# Patient Record
Sex: MALE | Race: WHITE | NOT HISPANIC OR LATINO | Employment: OTHER | URBAN - METROPOLITAN AREA
[De-identification: names, ages, dates, MRNs, and addresses within clinical notes are randomized per-mention and may not be internally consistent; named-entity substitution may affect disease eponyms.]

---

## 2017-01-10 PROBLEM — M25.561 ACUTE BILATERAL KNEE PAIN: Status: ACTIVE | Noted: 2017-01-10

## 2017-01-10 PROBLEM — M25.562 ACUTE BILATERAL KNEE PAIN: Status: ACTIVE | Noted: 2017-01-10

## 2017-05-23 PROBLEM — R53.81 MALAISE AND FATIGUE: Status: ACTIVE | Noted: 2017-05-23

## 2017-05-23 PROBLEM — M17.0 PRIMARY OSTEOARTHRITIS OF BOTH KNEES: Status: ACTIVE | Noted: 2017-05-23

## 2017-05-23 PROBLEM — M25.552 PAIN OF BOTH HIP JOINTS: Status: ACTIVE | Noted: 2017-05-23

## 2017-05-23 PROBLEM — M25.551 PAIN OF BOTH HIP JOINTS: Status: ACTIVE | Noted: 2017-05-23

## 2017-05-23 PROBLEM — G89.29 BILATERAL CHRONIC KNEE PAIN: Status: ACTIVE | Noted: 2017-01-10

## 2017-05-23 PROBLEM — M25.462 EFFUSION, LEFT KNEE: Status: ACTIVE | Noted: 2017-05-23

## 2017-05-23 PROBLEM — R53.83 MALAISE AND FATIGUE: Status: ACTIVE | Noted: 2017-05-23

## 2017-05-29 PROBLEM — E29.1 HYPOGONADISM IN MALE: Status: ACTIVE | Noted: 2017-05-29

## 2017-05-30 DIAGNOSIS — M25.552 BILATERAL HIP PAIN: Primary | ICD-10-CM

## 2017-05-30 DIAGNOSIS — M25.562 PAIN IN BOTH KNEES, UNSPECIFIED CHRONICITY: ICD-10-CM

## 2017-05-30 DIAGNOSIS — M25.561 PAIN IN BOTH KNEES, UNSPECIFIED CHRONICITY: ICD-10-CM

## 2017-05-30 DIAGNOSIS — M25.551 BILATERAL HIP PAIN: Primary | ICD-10-CM

## 2017-05-31 ENCOUNTER — HOSPITAL ENCOUNTER (OUTPATIENT)
Dept: RADIOLOGY | Facility: HOSPITAL | Age: 62
Discharge: HOME | End: 2017-05-31
Attending: ORTHOPAEDIC SURGERY
Payer: COMMERCIAL

## 2017-05-31 ENCOUNTER — OFFICE VISIT (OUTPATIENT)
Dept: ORTHOPEDICS | Facility: CLINIC | Age: 62
End: 2017-05-31
Payer: COMMERCIAL

## 2017-05-31 VITALS — HEIGHT: 71 IN | WEIGHT: 315 LBS | BODY MASS INDEX: 44.1 KG/M2

## 2017-05-31 DIAGNOSIS — M25.561 PAIN IN BOTH KNEES, UNSPECIFIED CHRONICITY: ICD-10-CM

## 2017-05-31 DIAGNOSIS — M25.551 HIP PAIN, BILATERAL: ICD-10-CM

## 2017-05-31 DIAGNOSIS — M25.552 BILATERAL HIP PAIN: ICD-10-CM

## 2017-05-31 DIAGNOSIS — M25.552 HIP PAIN, BILATERAL: ICD-10-CM

## 2017-05-31 DIAGNOSIS — M25.562 PAIN IN BOTH KNEES, UNSPECIFIED CHRONICITY: ICD-10-CM

## 2017-05-31 DIAGNOSIS — M17.0 PRIMARY OSTEOARTHRITIS OF BOTH KNEES: Primary | ICD-10-CM

## 2017-05-31 DIAGNOSIS — E66.01 MORBID OBESITY WITH BMI OF 45.0-49.9, ADULT: ICD-10-CM

## 2017-05-31 DIAGNOSIS — M25.551 BILATERAL HIP PAIN: ICD-10-CM

## 2017-05-31 PROCEDURE — 73521 X-RAY EXAM HIPS BI 2 VIEWS: CPT | Mod: TC,PO

## 2017-05-31 PROCEDURE — 73562 X-RAY EXAM OF KNEE 3: CPT | Mod: TC,50,PO

## 2017-05-31 PROCEDURE — 99243 OFF/OP CNSLTJ NEW/EST LOW 30: CPT | Mod: S$GLB,,, | Performed by: ORTHOPAEDIC SURGERY

## 2017-05-31 PROCEDURE — 73521 X-RAY EXAM HIPS BI 2 VIEWS: CPT | Mod: 26,,, | Performed by: RADIOLOGY

## 2017-05-31 PROCEDURE — 73562 X-RAY EXAM OF KNEE 3: CPT | Mod: 26,50,, | Performed by: RADIOLOGY

## 2017-05-31 PROCEDURE — 99999 PR PBB SHADOW E&M-EST. PATIENT-LVL II: CPT | Mod: PBBFAC,,, | Performed by: ORTHOPAEDIC SURGERY

## 2017-05-31 NOTE — PROGRESS NOTES
Arthur Moreno, 62 years old, referred by Dr. Delaney.  Communication via [employer].    Complaining of right greater than left knee for about a year's time.  It had   gotten progressively worse, had it for multiple years.  In fact, he had knee   scope on each knee x2.  He has had viscosupplementation.  He has had nerve   blocks, all of which only gave him partial temporary relief he is getting   progressively worse.  He is interested in more aggressive treatment.  Pain is   7/10 on the pain scale, debilitating pain.    Exam today shows he has tenderness at the joint line.  No signs of infection.    No instability.  Hip range of motion does not reproduce his symptoms.  Skin is   intact.  Compartments are soft.    X-rays of the knee showed degenerative changes, right greater than left, mild   degenerative changes of the hips.    ASSESSMENT:  Knee arthrosis.    PLAN:  We discussed with him in repeating injections and other conservative   measures versus knee replacement surgery.  He is going to consider these   options.  I would be happy to offer him any of these as he has already undergone   longstanding nonoperative course, but we would be happy to continue that if   that is what he wants to do.  We will work and proceed with knee replacement on   the right.      PBB/PN  dd: 05/31/2017 09:55:11 (CDT)  td: 06/01/2017 00:45:30 (CDT)  Doc ID   #[id number]  Job ID #[id number]    CC:     Further History  Aching pain  Worse with activity  Relieved with rest  No other associated symptoms  No other radiation    Further Exam  Alert and oriented  Pleasant  Contralateral limb has appropriate range of motion for age and condition  Contralateral limb has appropriate strength for age and condition  Contralateral limb has appropriate stability  for age and condition  No adenopathy  Pulses are appropriate for current condition  Skin is intact        Chief Complaint    Chief Complaint   Patient presents with    Knee Pain     bilat knee    Hip  Pain     bilat hip        HPI  Arthur Moreno is a 62 y.o.  male who presents with       Past Medical History  Past Medical History:   Diagnosis Date    Amaurosis fugax of right eye 4/2014    resolved    Bilateral venous insufficiency     legs    Calculus of gallbladder without mention of cholecystitis or obstruction     Cardiac arrhythmia     has implanted loop recorder, palpitations. Hx dizziness on bending over    Cellulitis and abscess of leg April 2014    treated at wound care center, resolving    Coronary artery disease     denies chest pain, denies MI, no stents; sees Dr Grant    Generalized osteoarthrosis, involving multiple sites     Hyperlipidemia     Hypertension     Hypertension 10/7/2015    Hypothyroidism     Knee pain     Obesity, unspecified     Occlusion and stenosis of carotid artery without mention of cerebral infarction     Sleep apnea with use of continuous positive airway pressure (CPAP)     Status post placement of implantable loop recorder     Thyroid disease     Transient arterial occlusion of retina     Transient visual loss     BOTH EYE  PER DR CAMPOVERDE  7/27/2015       Past Surgical History  Past Surgical History:   Procedure Laterality Date    BACK SURGERY N/A     decompression of L2 to L5     CARDIAC SURGERY  2013?    insertion loop recorder    CARDIAC SURGERY  2014    transesophageal echo; no clot seen, per patient    CARPAL TUNNEL RELEASE      bilateral    CHOLECYSTECTOMY      EVLT Right     HERNIA REPAIR      KNEE ARTHROSCOPY      bilateral    LAPAROSCOPIC GASTRIC BANDING      LHC      LUMBAR EPIDURAL INJECTION      POLYPECTOMY      vocal cord    ROTATOR CUFF REPAIR      right       Medications  Current Outpatient Prescriptions   Medication Sig    alprazolam (XANAX) 0.5 MG tablet Take 1 tablet (0.5 mg total) by mouth 3 (three) times daily. (Patient taking differently: Take 0.5 mg by mouth daily as needed for Anxiety. )    aspirin (ECOTRIN) 81 MG  EC tablet Take 81 mg by mouth once daily. Patient held since 6/12/16 per md instructions    atenolol (TENORMIN) 50 MG tablet Take 1 tablet (50 mg total) by mouth once daily.    atorvastatin (LIPITOR) 40 MG tablet Take 1 tablet (40 mg total) by mouth nightly. Use another dose prior to arrival (Patient taking differently: Take 40 mg by mouth nightly. )    b complex vitamins capsule Take 1 capsule by mouth once daily.    docusate sodium (COLACE) 100 MG capsule Take 1 capsule (100 mg total) by mouth once daily.    FLUVIRIN [id number] 45 mcg (15 mcg x 3)/0.5 mL Susp     fosinopril (MONOPRIL) 40 MG tablet Take 1 tablet (40 mg total) by mouth once daily.    glucosamine-chondroitin 500-400 mg tablet Take 2 tablets by mouth once daily.    Lactobacillus rhamnosus GG (CULTURELLE) 10 billion cell capsule Take 1 capsule by mouth once daily.    levothyroxine (SYNTHROID) 88 MCG tablet Take 1 tablet (88 mcg total) by mouth before breakfast.    lorcaserin (BELVIQ) 10 mg Tab Take 10 mg by mouth 2 (two) times daily.    multivitamin (ONE DAILY MULTIVITAMIN) per tablet Take 1 tablet by mouth once daily.    NUCYNTA 75 mg Tab Take 75 mg by mouth every 6 (six) hours as needed.     oxycodone-acetaminophen (PERCOCET) 7.5-325 mg per tablet Take 1 tablet by mouth every 4 (four) hours as needed. Take 1-2 tabs by mouth every 4 hours as needed for pain.    potassium chloride SA (K-DUR,KLOR-CON) 10 MEQ tablet Take 2 tablets (20 mEq total) by mouth once daily.    torsemide (DEMADEX) 20 MG Tab Take 1 tablet (20 mg total) by mouth every morning.     No current facility-administered medications for this visit.        Allergies  Review of patient's allergies indicates:   Allergen Reactions    Lamisil [terbinafine] Rash     Emollient form only , also had Bactrim in it, not sure       Family History  Family History   Problem Relation Age of Onset    Diabetes Mother     Heart disease Mother     Diabetes Father     Heart disease Father         Social History  Social History     Social History    Marital status:      Spouse name: N/A    Number of children: N/A    Years of education: N/A     Occupational History    Not on file.     Social History Main Topics    Smoking status: Never Smoker    Smokeless tobacco: Never Used    Alcohol use Yes      Comment: daily  16 oz daily     Drug use: No    Sexual activity: Not on file     Other Topics Concern    Not on file     Social History Narrative    No narrative on file               Review of Systems     Constitutional: Negative    HENT: Negative  Eyes: Negative  Respiratory: Negative  Cardiovascular: Negative  Musculoskeletal: HPI  Skin: Negative  Neurological: Negative  Hematological: Negative  Endocrine: Negative                 Physical Exam    There were no vitals filed for this visit.  Body mass index is 47.84 kg/m².  Physical Examination:     General appearance -  well appearing, and in no distress  Mental status - awake  Neck - supple  Chest -  symmetric air entry  Heart - normal rate   Abdomen - soft      Assessment     1. Primary osteoarthritis of both knees    2. Pain in both knees, unspecified chronicity    3. Hip pain, bilateral    4. Morbid obesity with BMI of 45.0-49.9, adult          Plan

## 2017-06-06 DIAGNOSIS — M17.9 DJD (DEGENERATIVE JOINT DISEASE) OF KNEE: ICD-10-CM

## 2017-06-06 DIAGNOSIS — M17.11 PRIMARY OSTEOARTHRITIS OF RIGHT KNEE: ICD-10-CM

## 2017-06-06 DIAGNOSIS — M25.561 RIGHT KNEE PAIN, UNSPECIFIED CHRONICITY: Primary | ICD-10-CM

## 2017-06-06 DIAGNOSIS — M17.11 PRIMARY OSTEOARTHRITIS OF RIGHT KNEE: Primary | ICD-10-CM

## 2017-06-06 RX ORDER — MUPIROCIN 20 MG/G
1 OINTMENT TOPICAL
Status: CANCELLED | OUTPATIENT
Start: 2017-06-06

## 2017-06-07 RX ORDER — MUPIROCIN 20 MG/G
1 OINTMENT TOPICAL
Status: CANCELLED | OUTPATIENT
Start: 2017-06-07

## 2017-06-13 ENCOUNTER — HOSPITAL ENCOUNTER (OUTPATIENT)
Dept: RADIOLOGY | Facility: HOSPITAL | Age: 62
Discharge: HOME | End: 2017-06-13
Attending: ORTHOPAEDIC SURGERY
Payer: COMMERCIAL

## 2017-06-13 DIAGNOSIS — M25.561 RIGHT KNEE PAIN, UNSPECIFIED CHRONICITY: ICD-10-CM

## 2017-06-13 DIAGNOSIS — M17.11 PRIMARY OSTEOARTHRITIS OF RIGHT KNEE: ICD-10-CM

## 2017-06-13 DIAGNOSIS — Z98.890 POST-OPERATIVE STATE: Primary | ICD-10-CM

## 2017-06-13 PROCEDURE — 77073 BONE LENGTH STUDIES: CPT | Mod: TC,PO

## 2017-06-13 PROCEDURE — 77073 BONE LENGTH STUDIES: CPT | Mod: 26,,, | Performed by: RADIOLOGY

## 2017-06-14 NOTE — TELEPHONE ENCOUNTER
----- Message from Cheryle Melendrez sent at 6/14/2017  8:20 AM CDT -----  Contact: self  Patient is returning Nakita's call regarding procedure appointment.  Please call patient at [phone].  Thanks!

## 2017-06-15 RX ORDER — WARFARIN SODIUM 5 MG/1
5 TABLET ORAL DAILY
Qty: 30 TABLET | Refills: 0 | Status: ON HOLD | OUTPATIENT
Start: 2017-07-31 | End: 2017-08-17

## 2017-06-15 RX ORDER — OXYCODONE AND ACETAMINOPHEN 5; 325 MG/1; MG/1
1 TABLET ORAL
Qty: 47 TABLET | Refills: 0 | Status: SHIPPED | OUTPATIENT
Start: 2017-08-01 | End: 2017-11-06 | Stop reason: SDUPTHER

## 2017-06-16 ENCOUNTER — HOSPITAL ENCOUNTER (OUTPATIENT)
Dept: RADIOLOGY | Facility: HOSPITAL | Age: 62
Discharge: HOME | End: 2017-06-16
Attending: ORTHOPAEDIC SURGERY
Payer: COMMERCIAL

## 2017-06-16 DIAGNOSIS — M25.561 RIGHT KNEE PAIN, UNSPECIFIED CHRONICITY: ICD-10-CM

## 2017-06-16 DIAGNOSIS — M17.11 PRIMARY OSTEOARTHRITIS OF RIGHT KNEE: ICD-10-CM

## 2017-06-16 PROCEDURE — 73721 MRI JNT OF LWR EXTRE W/O DYE: CPT | Mod: TC,PO,RT

## 2017-06-16 PROCEDURE — 73721 MRI JNT OF LWR EXTRE W/O DYE: CPT | Mod: 26,RT,, | Performed by: RADIOLOGY

## 2017-06-27 ENCOUNTER — TELEPHONE (OUTPATIENT)
Dept: ORTHOPEDICS | Facility: CLINIC | Age: 62
End: 2017-06-27

## 2017-06-27 NOTE — TELEPHONE ENCOUNTER
----- Message from Daryn Zhang sent at 6/27/2017 10:46 AM CDT -----  Contact: pt  Pr is requesting that his surgery clearance request form is faxed over to his cardiologist office  Fax#[phone]   Call Back#[phone]  Thanks

## 2017-07-10 ENCOUNTER — TELEPHONE (OUTPATIENT)
Dept: ORTHOPEDICS | Facility: CLINIC | Age: 62
End: 2017-07-10

## 2017-07-25 DIAGNOSIS — Z96.651 S/P TOTAL KNEE ARTHROPLASTY, RIGHT: Primary | ICD-10-CM

## 2017-07-26 ENCOUNTER — ANTI-COAG VISIT (OUTPATIENT)
Dept: CARDIOLOGY | Facility: CLINIC | Age: 62
End: 2017-07-26

## 2017-07-26 DIAGNOSIS — Z79.01 LONG TERM (CURRENT) USE OF ANTICOAGULANTS: ICD-10-CM

## 2017-08-15 PROBLEM — M17.9 DJD (DEGENERATIVE JOINT DISEASE) OF KNEE: Status: ACTIVE | Noted: 2017-08-15

## 2017-08-17 ENCOUNTER — TELEPHONE (OUTPATIENT)
Dept: ORTHOPEDICS | Facility: CLINIC | Age: 62
End: 2017-08-17

## 2017-08-17 NOTE — TELEPHONE ENCOUNTER
----- Message from Deepali Barrett sent at 8/17/2017 12:18 PM CDT -----  Contact: camelia   Pain pump has come apart from port   Liquid spraying out   Tied off  Call back

## 2017-08-21 ENCOUNTER — ANTI-COAG VISIT (OUTPATIENT)
Dept: CARDIOLOGY | Facility: CLINIC | Age: 62
End: 2017-08-21

## 2017-08-21 DIAGNOSIS — Z79.01 LONG TERM (CURRENT) USE OF ANTICOAGULANTS: ICD-10-CM

## 2017-08-21 LAB — INR PPP: 1.1 (ref 2–3)

## 2017-08-24 ENCOUNTER — ANTI-COAG VISIT (OUTPATIENT)
Dept: CARDIOLOGY | Facility: CLINIC | Age: 62
End: 2017-08-24

## 2017-08-24 DIAGNOSIS — Z79.01 LONG TERM (CURRENT) USE OF ANTICOAGULANTS: ICD-10-CM

## 2017-08-24 LAB — INR PPP: 1.3 (ref 2–3)

## 2017-08-28 ENCOUNTER — ANTI-COAG VISIT (OUTPATIENT)
Dept: CARDIOLOGY | Facility: CLINIC | Age: 62
End: 2017-08-28

## 2017-08-28 DIAGNOSIS — Z79.01 LONG TERM (CURRENT) USE OF ANTICOAGULANTS: ICD-10-CM

## 2017-08-28 LAB — INR PPP: 1.5 (ref 2–3)

## 2017-08-30 ENCOUNTER — OFFICE VISIT (OUTPATIENT)
Dept: ORTHOPEDICS | Facility: CLINIC | Age: 62
End: 2017-08-30
Payer: COMMERCIAL

## 2017-08-30 VITALS — HEIGHT: 71 IN | BODY MASS INDEX: 44.1 KG/M2 | WEIGHT: 315 LBS

## 2017-08-30 DIAGNOSIS — Z96.651 S/P TOTAL KNEE ARTHROPLASTY, RIGHT: Primary | ICD-10-CM

## 2017-08-30 PROCEDURE — 99999 PR PBB SHADOW E&M-EST. PATIENT-LVL II: CPT | Mod: PBBFAC,,, | Performed by: ORTHOPAEDIC SURGERY

## 2017-08-30 PROCEDURE — 99024 POSTOP FOLLOW-UP VISIT: CPT | Mod: S$GLB,,, | Performed by: ORTHOPAEDIC SURGERY

## 2017-08-30 RX ORDER — OXYCODONE AND ACETAMINOPHEN 5; 325 MG/1; MG/1
1 TABLET ORAL EVERY 6 HOURS PRN
Qty: 37 TABLET | Refills: 0
Start: 2017-08-30 | End: 2017-11-06 | Stop reason: SDUPTHER

## 2017-09-07 DIAGNOSIS — Z96.651 S/P TOTAL KNEE ARTHROPLASTY, RIGHT: Primary | ICD-10-CM

## 2017-09-07 RX ORDER — OXYCODONE AND ACETAMINOPHEN 5; 325 MG/1; MG/1
1 TABLET ORAL EVERY 6 HOURS PRN
Qty: 32 TABLET | Refills: 0 | Status: SHIPPED | OUTPATIENT
Start: 2017-09-07 | End: 2017-11-06 | Stop reason: SDUPTHER

## 2017-09-25 DIAGNOSIS — Z96.651 S/P TOTAL KNEE ARTHROPLASTY, RIGHT: Primary | ICD-10-CM

## 2017-09-27 ENCOUNTER — OFFICE VISIT (OUTPATIENT)
Dept: ORTHOPEDICS | Facility: CLINIC | Age: 62
End: 2017-09-27
Payer: COMMERCIAL

## 2017-09-27 ENCOUNTER — HOSPITAL ENCOUNTER (OUTPATIENT)
Dept: RADIOLOGY | Facility: HOSPITAL | Age: 62
Discharge: HOME | End: 2017-09-27
Attending: ORTHOPAEDIC SURGERY
Payer: COMMERCIAL

## 2017-09-27 VITALS — WEIGHT: 315 LBS | HEIGHT: 71 IN | BODY MASS INDEX: 44.1 KG/M2

## 2017-09-27 DIAGNOSIS — Z96.651 S/P TOTAL KNEE ARTHROPLASTY, RIGHT: ICD-10-CM

## 2017-09-27 DIAGNOSIS — Z96.651 S/P TOTAL KNEE ARTHROPLASTY, RIGHT: Primary | ICD-10-CM

## 2017-09-27 PROCEDURE — 99024 POSTOP FOLLOW-UP VISIT: CPT | Mod: S$GLB,,, | Performed by: ORTHOPAEDIC SURGERY

## 2017-09-27 PROCEDURE — 99999 PR PBB SHADOW E&M-EST. PATIENT-LVL III: CPT | Mod: PBBFAC,,, | Performed by: ORTHOPAEDIC SURGERY

## 2017-09-27 PROCEDURE — 73560 X-RAY EXAM OF KNEE 1 OR 2: CPT | Mod: 26,59,LT, | Performed by: RADIOLOGY

## 2017-09-27 PROCEDURE — 73562 X-RAY EXAM OF KNEE 3: CPT | Mod: 26,RT,, | Performed by: RADIOLOGY

## 2017-09-27 PROCEDURE — 73560 X-RAY EXAM OF KNEE 1 OR 2: CPT | Mod: TC,PO,LT

## 2017-09-27 RX ORDER — OXYCODONE AND ACETAMINOPHEN 5; 325 MG/1; MG/1
1 TABLET ORAL EVERY 6 HOURS PRN
Qty: 32 TABLET | Refills: 0
Start: 2017-09-27 | End: 2017-11-27 | Stop reason: ALTCHOICE

## 2017-11-06 DIAGNOSIS — M25.561 ACUTE PAIN OF RIGHT KNEE: Primary | ICD-10-CM

## 2017-11-08 ENCOUNTER — HOSPITAL ENCOUNTER (OUTPATIENT)
Dept: RADIOLOGY | Facility: HOSPITAL | Age: 62
Discharge: HOME | End: 2017-11-08
Attending: ORTHOPAEDIC SURGERY
Payer: COMMERCIAL

## 2017-11-08 ENCOUNTER — OFFICE VISIT (OUTPATIENT)
Dept: ORTHOPEDICS | Facility: CLINIC | Age: 62
End: 2017-11-08
Payer: COMMERCIAL

## 2017-11-08 VITALS — BODY MASS INDEX: 44.1 KG/M2 | HEIGHT: 71 IN | WEIGHT: 315 LBS

## 2017-11-08 DIAGNOSIS — M25.561 ACUTE PAIN OF RIGHT KNEE: Primary | ICD-10-CM

## 2017-11-08 DIAGNOSIS — Z96.651 S/P TOTAL KNEE ARTHROPLASTY, RIGHT: ICD-10-CM

## 2017-11-08 DIAGNOSIS — M25.561 ACUTE PAIN OF RIGHT KNEE: ICD-10-CM

## 2017-11-08 PROCEDURE — 73560 X-RAY EXAM OF KNEE 1 OR 2: CPT | Mod: TC,PO,LT

## 2017-11-08 PROCEDURE — 99999 PR PBB SHADOW E&M-EST. PATIENT-LVL III: CPT | Mod: PBBFAC,,, | Performed by: ORTHOPAEDIC SURGERY

## 2017-11-08 PROCEDURE — 73560 X-RAY EXAM OF KNEE 1 OR 2: CPT | Mod: 26,59,LT, | Performed by: RADIOLOGY

## 2017-11-08 PROCEDURE — 73562 X-RAY EXAM OF KNEE 3: CPT | Mod: 26,RT,, | Performed by: RADIOLOGY

## 2017-11-08 PROCEDURE — 99024 POSTOP FOLLOW-UP VISIT: CPT | Mod: S$GLB,,, | Performed by: ORTHOPAEDIC SURGERY

## 2017-11-08 NOTE — PROGRESS NOTES
Tiffanie is about three months out from knee replacement surgery.  He is doing   okay.  He reports pain 3/10 on the pain scale.  He states he is better than he   was before surgery.    Exam checks out well.  Good motion, good strength.  No instability.    X-rays show well-placed components.    ASSESSMENT:  Status post knee replacement three months out.    PLAN:  Continue with therapy.  Follow up in six weeks' time.  No x-rays needed.      MEGAN/MITZI  dd: 11/08/2017 08:32:16 (CST)  td: 11/08/2017 12:58:18 (CST)  Doc ID   #[id number]  Job ID #[id number]    CC:

## 2017-11-09 ENCOUNTER — PATIENT MESSAGE (OUTPATIENT)
Dept: ORTHOPEDICS | Facility: CLINIC | Age: 62
End: 2017-11-09

## 2017-11-24 ENCOUNTER — PATIENT MESSAGE (OUTPATIENT)
Dept: ORTHOPEDICS | Facility: CLINIC | Age: 62
End: 2017-11-24

## 2017-12-06 ENCOUNTER — OFFICE VISIT (OUTPATIENT)
Dept: ORTHOPEDICS | Facility: CLINIC | Age: 62
End: 2017-12-06
Payer: COMMERCIAL

## 2017-12-06 VITALS — HEIGHT: 71 IN | BODY MASS INDEX: 44.1 KG/M2 | WEIGHT: 315 LBS

## 2017-12-06 DIAGNOSIS — Z96.651 S/P TOTAL KNEE ARTHROPLASTY, RIGHT: Primary | ICD-10-CM

## 2017-12-06 PROCEDURE — 99213 OFFICE O/P EST LOW 20 MIN: CPT | Mod: S$GLB,,, | Performed by: ORTHOPAEDIC SURGERY

## 2017-12-06 PROCEDURE — 99999 PR PBB SHADOW E&M-EST. PATIENT-LVL II: CPT | Mod: PBBFAC,,, | Performed by: ORTHOPAEDIC SURGERY

## 2017-12-06 NOTE — PROGRESS NOTES
HISTORY OF PRESENT ILLNESS:  62 years old, four months out from knee   replacement, says he is satisfied with the results.  Rates the pain as 0/10 on   the pain scale.  Says he is ready to return to work.    PHYSICAL EXAMINATION:  Exam shows he has good motion, good strength, no   instability.    X-rays show well-placed components.    ASSESSMENT:  Status post knee replacement surgery.    PLAN:  Activity to tolerance.  Continue with strengthening.  Follow up as   needed.      KELLEY  dd: 12/06/2017 08:09:17 (CST)  td: 12/06/2017 23:37:43 (CST)  Doc ID   #[id number]  Job ID #[id number]    CC:

## 2018-05-16 ENCOUNTER — TELEPHONE (OUTPATIENT)
Dept: NEUROSURGERY | Facility: CLINIC | Age: 63
End: 2018-05-16

## 2019-03-13 DIAGNOSIS — Z98.1 STATUS POST LUMBAR SPINAL FUSION: Primary | ICD-10-CM

## 2019-03-21 ENCOUNTER — HOSPITAL ENCOUNTER (OUTPATIENT)
Dept: RADIOLOGY | Facility: HOSPITAL | Age: 64
Discharge: HOME | End: 2019-03-21
Attending: NEUROLOGICAL SURGERY
Payer: COMMERCIAL

## 2019-03-21 ENCOUNTER — OFFICE VISIT (OUTPATIENT)
Dept: NEUROSURGERY | Facility: CLINIC | Age: 64
End: 2019-03-21
Payer: COMMERCIAL

## 2019-03-21 VITALS
HEIGHT: 71 IN | SYSTOLIC BLOOD PRESSURE: 148 MMHG | WEIGHT: 315 LBS | BODY MASS INDEX: 44.1 KG/M2 | TEMPERATURE: 98 F | HEART RATE: 67 BPM | DIASTOLIC BLOOD PRESSURE: 78 MMHG

## 2019-03-21 DIAGNOSIS — Z98.1 STATUS POST LUMBAR SPINAL FUSION: ICD-10-CM

## 2019-03-21 DIAGNOSIS — G47.33 OSA (OBSTRUCTIVE SLEEP APNEA): ICD-10-CM

## 2019-03-21 DIAGNOSIS — M54.16 SPINAL STENOSIS OF LUMBAR REGION WITH RADICULOPATHY: ICD-10-CM

## 2019-03-21 DIAGNOSIS — I10 HYPERTENSION, UNSPECIFIED TYPE: ICD-10-CM

## 2019-03-21 DIAGNOSIS — M48.061 SPINAL STENOSIS OF LUMBAR REGION WITH RADICULOPATHY: ICD-10-CM

## 2019-03-21 DIAGNOSIS — M48.061 SPINAL STENOSIS OF LUMBAR REGION, UNSPECIFIED WHETHER NEUROGENIC CLAUDICATION PRESENT: Primary | ICD-10-CM

## 2019-03-21 DIAGNOSIS — E66.01 MORBID OBESITY: ICD-10-CM

## 2019-03-21 PROCEDURE — 99214 PR OFFICE/OUTPT VISIT, EST, LEVL IV, 30-39 MIN: ICD-10-PCS | Mod: S$GLB,,, | Performed by: PHYSICIAN ASSISTANT

## 2019-03-21 PROCEDURE — 3008F PR BODY MASS INDEX (BMI) DOCUMENTED: ICD-10-PCS | Mod: CPTII,S$GLB,, | Performed by: PHYSICIAN ASSISTANT

## 2019-03-21 PROCEDURE — 99999 PR PBB SHADOW E&M-EST. PATIENT-LVL IV: CPT | Mod: PBBFAC,,, | Performed by: PHYSICIAN ASSISTANT

## 2019-03-21 PROCEDURE — 3008F BODY MASS INDEX DOCD: CPT | Mod: CPTII,S$GLB,, | Performed by: PHYSICIAN ASSISTANT

## 2019-03-21 PROCEDURE — 99999 PR PBB SHADOW E&M-EST. PATIENT-LVL IV: ICD-10-PCS | Mod: PBBFAC,,, | Performed by: PHYSICIAN ASSISTANT

## 2019-03-21 PROCEDURE — 3077F PR MOST RECENT SYSTOLIC BLOOD PRESSURE >= 140 MM HG: ICD-10-PCS | Mod: CPTII,S$GLB,, | Performed by: PHYSICIAN ASSISTANT

## 2019-03-21 PROCEDURE — 72110 X-RAY EXAM L-2 SPINE 4/>VWS: CPT | Mod: 26,,, | Performed by: RADIOLOGY

## 2019-03-21 PROCEDURE — 99214 OFFICE O/P EST MOD 30 MIN: CPT | Mod: S$GLB,,, | Performed by: PHYSICIAN ASSISTANT

## 2019-03-21 PROCEDURE — 3077F SYST BP >= 140 MM HG: CPT | Mod: CPTII,S$GLB,, | Performed by: PHYSICIAN ASSISTANT

## 2019-03-21 PROCEDURE — 72110 X-RAY EXAM L-2 SPINE 4/>VWS: CPT | Mod: TC,FY,PO

## 2019-03-21 PROCEDURE — 3078F PR MOST RECENT DIASTOLIC BLOOD PRESSURE < 80 MM HG: ICD-10-PCS | Mod: CPTII,S$GLB,, | Performed by: PHYSICIAN ASSISTANT

## 2019-03-21 PROCEDURE — 72110 XR LUMBAR SPINE 5 VIEW WITH FLEX AND EXT: ICD-10-PCS | Mod: 26,,, | Performed by: RADIOLOGY

## 2019-03-21 PROCEDURE — 3078F DIAST BP <80 MM HG: CPT | Mod: CPTII,S$GLB,, | Performed by: PHYSICIAN ASSISTANT

## 2019-04-01 NOTE — PROGRESS NOTES
Neurosurgery Outpatient Follow Up    Patient ID: Artuhr Moreno is a 64 y.o. male.    Chief Complaint   Patient presents with    Follow-up     3 years s/p L2-5 PSIF. Patient reports worsening low back pain that started about a year ago. Reports pain to his BLE to the feet. L>R           Review of Systems   Constitutional: Negative for activity change, appetite change, chills, fever and unexpected weight change.   HENT: Negative for tinnitus, trouble swallowing and voice change.    Respiratory: Negative for apnea, cough, chest tightness and shortness of breath.    Cardiovascular: Negative for chest pain and palpitations.   Gastrointestinal: Negative for constipation, diarrhea, nausea and vomiting.   Genitourinary: Negative for difficulty urinating, dysuria, frequency and urgency.   Musculoskeletal: Positive for back pain and gait problem. Negative for neck pain and neck stiffness.   Skin: Negative for wound.   Neurological: Negative for dizziness, tremors, seizures, facial asymmetry, speech difficulty, weakness, light-headedness, numbness and headaches.   Psychiatric/Behavioral: Negative for confusion and decreased concentration.       Past Medical History:   Diagnosis Date    Amaurosis fugax of right eye 4/2014    resolved    Bilateral venous insufficiency     legs    Calculus of gallbladder without mention of cholecystitis or obstruction     Cardiac arrhythmia     has implanted loop recorder, palpitations. Hx dizziness on bending over    Cellulitis and abscess of leg April 2014    treated at wound care center, resolving    Coronary artery disease     denies chest pain, denies MI, no stents; sees Dr Grant    Estrogen excess 08/2017    Generalized osteoarthrosis, involving multiple sites     Hyperlipidemia     Hypertension     Hypertension 10/7/2015    Hypothyroidism     Knee pain     right far worse than left    Obesity, unspecified     Occlusion and stenosis of carotid artery without mention of  cerebral infarction     Primary osteoarthritis of right knee 08/2017    Sleep apnea with use of continuous positive airway pressure (CPAP)     Status post placement of implantable loop recorder     Thyroid disease     Transient arterial occlusion of retina     Transient visual loss     BOTH EYE  PER DR CAMPOVERDE  7/27/2015     Social History     Socioeconomic History    Marital status:      Spouse name: Not on file    Number of children: Not on file    Years of education: Not on file    Highest education level: Not on file   Occupational History    Not on file   Social Needs    Financial resource strain: Not on file    Food insecurity:     Worry: Not on file     Inability: Not on file    Transportation needs:     Medical: Not on file     Non-medical: Not on file   Tobacco Use    Smoking status: Never Smoker    Smokeless tobacco: Never Used   Substance and Sexual Activity    Alcohol use: Yes     Comment: daily  16 oz daily     Drug use: No    Sexual activity: Yes     Partners: Female   Lifestyle    Physical activity:     Days per week: Not on file     Minutes per session: Not on file    Stress: Not on file   Relationships    Social connections:     Talks on phone: Not on file     Gets together: Not on file     Attends Shinto service: Not on file     Active member of club or organization: Not on file     Attends meetings of clubs or organizations: Not on file     Relationship status: Not on file    Intimate partner violence:     Fear of current or ex partner: Not on file     Emotionally abused: Not on file     Physically abused: Not on file     Forced sexual activity: Not on file   Other Topics Concern    Not on file   Social History Narrative    Not on file     Family History   Problem Relation Age of Onset    Diabetes Mother     Heart disease Mother     Alzheimer's disease Mother     Diabetes Father     Heart disease Father     Arthritis Sister     Arthritis Brother      "Arthritis Brother     Arthritis Brother      Review of patient's allergies indicates:   Allergen Reactions    Lamisil [terbinafine] Rash     Emollient form only , also had Bactrim in it, not sure       Current Outpatient Medications:     aspirin (ECOTRIN) 81 MG EC tablet, Take 81 mg by mouth once daily. Patient held since 6/12/16 per md instructions, Disp: , Rfl:     atenolol (TENORMIN) 50 MG tablet, Take 1 tablet (50 mg total) by mouth once daily., Disp: 90 tablet, Rfl: 3    atorvastatin (LIPITOR) 40 MG tablet, TAKE 1 TABLET AT BEDTIME FOR CHOLESTEROL, Disp: 30 tablet, Rfl: 14    b complex vitamins capsule, Take 1 capsule by mouth once daily., Disp: , Rfl:     fosinopril (MONOPRIL) 40 MG tablet, TAKE 1 TABLET DAILY FOR BLOOD PRESSURE, Disp: 30 tablet, Rfl: 14    Lactobacillus rhamnosus GG (CULTURELLE) 10 billion cell capsule, Take 1 capsule by mouth once daily., Disp: , Rfl:     levothyroxine (SYNTHROID) 88 MCG tablet, Take 1 tablet (88 mcg total) by mouth before breakfast., Disp: 90 tablet, Rfl: 3    methylPREDNISolone (MEDROL DOSEPACK) 4 mg tablet, use as directed, Disp: 1 Package, Rfl: 0    multivitamin (ONE DAILY MULTIVITAMIN) per tablet, Take 1 tablet by mouth once daily., Disp: , Rfl:     potassium chloride SA (K-DUR,KLOR-CON) 10 MEQ tablet, 2 PO DAILY, Disp: , Rfl:     torsemide (DEMADEX) 20 MG Tab, TAKE 1 TABLET DAILY IN THE MORNING FOR FLUID, Disp: 30 tablet, Rfl: 11  Blood pressure (!) 148/78, pulse 67, temperature 98.2 °F (36.8 °C), temperature source Oral, height 5' 10.5" (1.791 m), weight (!) 151.8 kg (334 lb 10.5 oz).      Neurologic Exam     Mental Status   Oriented to person, place, and time.   Follows 3 step commands.   Attention: normal. Concentration: normal.   Speech: speech is normal   Level of consciousness: alert  Knowledge: consistent with education.   Able to name object. Able to read. Able to repeat. Able to write. Normal comprehension.     Cranial Nerves     CN II   Visual " acuity: normal  Right visual field deficit: none  Left visual field deficit: none     CN III, IV, VI   Pupils are equal, round, and reactive to light.  Right pupil: Size: 3 mm. Shape: regular. Reactivity: brisk. Consensual response: intact. Accommodation: intact.   Left pupil: Size: 3 mm. Shape: regular. Reactivity: brisk. Consensual response: intact. Accommodation: intact.   CN III: no CN III palsy  Nystagmus: none   Diplopia: none  Ophthalmoparesis: none  Conjugate gaze: present    CN V   Right facial sensation deficit: none  Left facial sensation deficit: none    CN VII   Right facial weakness: none  Left facial weakness: none    CN VIII   Hearing: intact    CN IX, X   CN IX normal.   CN X normal.     CN XI   Right sternocleidomastoid strength: normal  Left sternocleidomastoid strength: normal  Right trapezius strength: normal  Left trapezius strength: normal    CN XII   Fasciculations: absent  Tongue deviation: none    Motor Exam   Muscle bulk: normal  Overall muscle tone: normal  Right arm pronator drift: absent  Left arm pronator drift: absent    Strength   Right neck flexion: 5/5  Left neck flexion: 5/5  Right neck extension: 5/5  Left neck extension: 5/5  Right deltoid: 5/5  Left deltoid: 5/5  Right biceps: 5/5  Left biceps: 5/5  Right triceps: 5/5  Left triceps: 5/5  Right wrist flexion: 5/5  Left wrist flexion: 5/5  Right wrist extension: 5/5  Left wrist extension: 5/5  Right interossei: 5/5  Left interossei: 5/5  Right abdominals: 5/5  Left abdominals: 5/5  Right iliopsoas: 5/5  Left iliopsoas: 5/5  Right quadriceps: 5/5  Left quadriceps: 5/5  Right hamstrin/5  Left hamstrin/5  Right glutei: 5/5  Left glutei: 5/5  Right anterior tibial: 5/5  Left anterior tibial: 5/5  Right posterior tibial: 5/5  Left posterior tibial: 5/5  Right peroneal: 5/5  Left peroneal: 5/5  Right gastroc: 5/5  Left gastroc: 5/5    Sensory Exam   Light touch normal.   Vibration normal.   Pinprick normal.     Gait,  Coordination, and Reflexes     Gait  Gait: normal    Coordination   Romberg: negative  Finger to nose coordination: normal  Heel to shin coordination: normal  Tandem walking coordination: normal    Tremor   Resting tremor: absent  Intention tremor: absent  Action tremor: absent    Reflexes   Right brachioradialis: 2+  Left brachioradialis: 2+  Right biceps: 2+  Left biceps: 2+  Right triceps: 2+  Left triceps: 2+  Right patellar: 2+  Left patellar: 1+  Right achilles: 2+  Left achilles: 2+  Right : 2+  Left : 2+  Right plantar: normal  Left plantar: normal  Right Pittman: absent  Left Pittman: absent  Right ankle clonus: absent  Left ankle clonus: absent      Physical Exam   Constitutional: He is oriented to person, place, and time.   Eyes: Pupils are equal, round, and reactive to light.   Neurological: He is oriented to person, place, and time. He has a normal Finger-Nose-Finger Test, a normal Heel to Shin Test, a normal Romberg Test and a normal Tandem Gait Test. Gait normal.   Reflex Scores:       Tricep reflexes are 2+ on the right side and 2+ on the left side.       Bicep reflexes are 2+ on the right side and 2+ on the left side.       Brachioradialis reflexes are 2+ on the right side and 2+ on the left side.       Patellar reflexes are 2+ on the right side and 1+ on the left side.       Achilles reflexes are 2+ on the right side and 2+ on the left side.  Psychiatric: His speech is normal.   lumbar incision well healed    Provider dictation:  The patient is a 61 year-old male s/p L2-5 decompression and PSIF in 2016 by Dr. Johnson presenting today for recurrence of lumbar back pain and left leg pain.  He was doing well postoperatively and had resumed his normal activity however over the last few months he has had progressive axial back pain as well as left leg pain.  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  He denies numbness tingling  or weakness of the leg.  He has had a total knee replacement on the right.  There is    His Oswestry score is 30%, his PHQ was 0.  Next  On physical examination, he is a morbidly obese  male in no acute distress.  He has diminished bilateral patellar reflexes bilaterally.  He has severe bilateral lower extremity edema with discoloration of the lower extremities.  Gait station and strength are all normal.  His sensation is intact throughout.    X-ray today demonstrates no evidence of hardware failure or complication.      The patient had been doing well for the last 2 and half years after his surgery however due to his activity level and likely obesity he has had recurrence of his axial back pain.  We will plan for CT to further evaluate any hardware complications evaluate his fusion, MRI of the lumbar spine to evaluate for any stenosis at scoliosis x-rays for further evaluation.  He will follow up after studies.    Visit Diagnosis:  Spinal stenosis of lumbar region, unspecified whether neurogenic claudication present  -     MRI Lumbar Spine Without Contrast; Future; Expected date: 03/22/2019  -     CT Lumbar Spine Without Contrast; Future; Expected date: 03/22/2019  -     X-Ray Spine Scoliosis 4 or 5 views; Future; Expected date: 03/22/2019    Spinal stenosis of lumbar region with radiculopathy    Status post lumbar spinal fusion    Morbid obesity    LIBERTAD (obstructive sleep apnea)    Hypertension, unspecified type

## 2019-04-08 ENCOUNTER — TELEPHONE (OUTPATIENT)
Dept: NEUROSURGERY | Facility: CLINIC | Age: 64
End: 2019-04-08

## 2019-04-08 DIAGNOSIS — M48.061 SPINAL STENOSIS OF LUMBAR REGION, UNSPECIFIED WHETHER NEUROGENIC CLAUDICATION PRESENT: Primary | ICD-10-CM

## 2019-04-08 NOTE — TELEPHONE ENCOUNTER
----- Message from Shirin Taylor PA-C sent at 4/5/2019  6:03 PM CDT -----  MRI with chronic degenerative changes. He may need extension to L5-S1, but I would like and EMG to confirm prior to follow up

## 2019-04-15 ENCOUNTER — PROCEDURE VISIT (OUTPATIENT)
Dept: NEUROLOGY | Facility: CLINIC | Age: 64
End: 2019-04-15
Payer: COMMERCIAL

## 2019-04-15 DIAGNOSIS — M48.061 SPINAL STENOSIS OF LUMBAR REGION, UNSPECIFIED WHETHER NEUROGENIC CLAUDICATION PRESENT: ICD-10-CM

## 2019-04-15 DIAGNOSIS — M54.17 LUMBOSACRAL RADICULOPATHY: Primary | ICD-10-CM

## 2019-04-15 PROCEDURE — 95886 MUSC TEST DONE W/N TEST COMP: CPT | Mod: S$GLB,,, | Performed by: PSYCHIATRY & NEUROLOGY

## 2019-04-15 PROCEDURE — 95886 PR EMG COMPLETE, W/ NERVE CONDUCTION STUDIES, 5+ MUSCLES: ICD-10-PCS | Mod: S$GLB,,, | Performed by: PSYCHIATRY & NEUROLOGY

## 2019-04-15 PROCEDURE — 95910 PR NERVE CONDUCTION STUDY; 7-8 STUDIES: ICD-10-PCS | Mod: S$GLB,,, | Performed by: PSYCHIATRY & NEUROLOGY

## 2019-04-15 PROCEDURE — 95910 NRV CNDJ TEST 7-8 STUDIES: CPT | Mod: S$GLB,,, | Performed by: PSYCHIATRY & NEUROLOGY

## 2019-04-24 NOTE — PROCEDURES
[facility]   [facility]  [address] [facility] [place] [address]  [phone]             Full Name: Arthur Moreno Gender: Male  Patient ID: [id number] YOB: 1955      Visit Date: 4/15/2019 11:21  Age: 64 Years 0 Months Old  Examining Physician: Carmen Martínez D.O., ABPN, AOBNP, ABEM   Referring Physician: Earl Johnson M.D.  Height: 5 feet 11 inch  History: Patient complains of back pain.  He has rare radiation to the legs, but does have radiation to the right buttock more than the left.  He has a history of back surgery in 2016.  He denies any recent injury.  He has been referred by neurosurgery for EMG of both lower extremities for evaluation of lumbosacral radiculopathy.      Sensory NCS      Nerve / Sites Rec. Site Onset Lat Peak Lat NP Amp Segments Distance Velocity Temp.     ms ms µV  cm m/s °C   R Sural - Ankle (Calf)      Calf Ankle NR NR NR Calf - Ankle 14 NR 34      Ref.   ?4.60 ?3.0 Ref.  ?40    L Sural - Ankle (Calf)      Calf Ankle NR NR NR Calf - Ankle 14 NR 34      Ref.   ?4.60 ?3.0 Ref.  ?40    R Superficial peroneal - Ankle      Lat leg Ankle NR NR NR Lat leg - Ankle 12 NR 34      Ref.   ?4.60 ?3.0 Ref.      L Superficial peroneal - Ankle      Lat leg Ankle NR NR NR Lat leg - Ankle 12 NR 34      Ref.   ?4.60 ?3.0 Ref.          Motor NCS      Nerve / Sites Muscle Latency Ref. Amplitude Ref. Amp % Duration Segments Distance Lat Diff Velocity Ref. Temp.     ms ms mV mV % ms  cm ms m/s m/s °C   R Peroneal - EDB      Ankle EDB 2.60 ?6.00 7.1 ?2.5 100 5.16 Ankle - EDB     34      Fib head EDB 10.21  5.7  79.9 6.15 Fib head - Ankle 34 7.60 45 ?40 34      Pop fossa EDB 11.82  5.9  84 6.93 Pop fossa - Fib head 9.5 1.61 59  34   L Peroneal - EDB      Ankle EDB 2.92 ?6.00 7.0 ?2.5 100 4.84 Ankle - EDB     34      Fib head EDB 10.68  7.0  99.3 5.63 Fib head - Ankle 37 7.76 48 ?40 34      Pop fossa EDB 12.40  6.5  92.9 5.52 Pop fossa - Fib head 9  1.72 52  34   R Tibial - AH      Ankle AH 3.39 ?6.00 5.3 ?2.5 100 4.79 Ankle - AH     34      Pop fossa AH 13.85  3.7  70.2 5.26 Pop fossa - Ankle 46 10.47 44 ?40 34   L Tibial - AH      Ankle AH 5.21 ?6.00 3.0 ?2.5 100 3.13 Ankle - AH     34      Pop fossa AH 15.47  2.6  86.4 3.70 Pop fossa - Ankle 44 10.26 43 ?40 34       F  Wave      Nerve Fmin Ref.    ms ms   R Peroneal - EDB 54.32 ?56.00   R Tibial - AH 60.47 ?56.00   L Peroneal - EDB 54.01 ?56.00   L Tibial - AH 59.69 ?56.00       EMG Summary Table     Spontaneous Recruitment Activation Duration Amplitude Polyphasia Comment   Muscle Ins Act Fib Fasc Pattern - - - - -   R. Tibialis anterior Normal 0 0 Sl Dec Normal +1 +2 N/+1 Normal   R. Gastrocnemius (Medial head) Inc Occ 0 Sl Dec Fair +1 +1 N/+1 Normal   R. Extensor hallucis longus Normal 0 0 Sl Dec Normal +1 +1 +2 Normal   R. Flexor digitorum longus Normal 0 0 Sl Dec Fair +1 +1 +2 Normal   R. Vastus medialis Inc +1 0 Sl Dec Normal +1 N/+1 +2 Normal   R. Biceps femoris (short head) Normal 0 0 Sl Dec Normal +1 N/+1 +2 Normal   R. Tensor fasciae latae Inc Occ 0 Mod Dec Normal +2 +1 +2 Normal   R. Lumbar paraspinals Normal 0 0      Normal   L. Tibialis anterior Normal 0 0 Sl Dec Normal +2 +1 +1 Normal   L. Gastrocnemius (Medial head) Normal 0 0 Sl Dec Normal N/+1 Normal N/+1 Normal   L. Extensor hallucis longus Normal 0 0 Sl Dec Normal +2 +1 +2 Normal   L. Flexor digitorum longus Normal 0 0 Mod Dec Normal +1 N/+1 +1 Normal   L. Vastus medialis Inc Rare 0 Sl Dec Normal N/+1 Normal N/+1 CRD   L. Biceps femoris (short head) Normal 0 0 Sl Dec Normal +1 +1 +1 Normal   L. Tensor fasciae latae Normal 0 0 Mod Dec Normal +1 +1 +1 Normal   L. Lumbar paraspinals Normal 0 0      Normal         Summary:  Nerve conduction studies were performed on both lower extremities.  Bilateral sural and superficial peroneal sensory responses were absent. Bilateral peroneal motor responses were normal in amplitude, latency and velocity.   Bilateral tibial motor responses were normal in amplitude, latency and velocity.  Bilateral peroneal minimal F wave latencies were normal.  Bilateral tibial minimal F wave latencies were borderline prolonged.  Needle EMG was performed in both lower extremities. Active denervation was present in the right medial gastrocnemius, right vastus medialis, right tensor fascia josé, and left vastus medialis.  Complex repetitive discharges were noted in the left vastus medialis.  Motor units were large, long and polyphasic with reduced recruitment in virtually every muscle tested.    Impression:  This is an abnormal and complex EMG of the lower extremities.  There is electrophysiologic evidence of the followin. Subacute on chronic, moderate to severe, right lumbosacral polyradiculopathy most prominent at the L5 nerve root, with slight active denervation in the L4, L5, and S1 territories.  2. Subacute on chronic, moderate to severe, left lumbosacral polyradiculopathy most prominent of the L5-S1 level, with slight active denervation noted at the L4 level.  3. The symmetric absence of the sural sensory response can be seen in this age group.  The absence of the superficial peroneal sensory response is likely secondary to a more proximal dorsal root ganglion in the setting of L4/5 radiculopathy.  There is no convincing evidence of a peripheral neuropathy, focal neuropathy or plexopathy on this study.  In addition, no myopathic changes were noted.      Thank you for referring to the [facility] in [place].  Please feel free to contact the clinic if you have any further questions regarding this study or report.        ____________________________  Carmen Martínez D.O., ABPN, AOBNP, YULISSA

## 2019-04-25 ENCOUNTER — OFFICE VISIT (OUTPATIENT)
Dept: NEUROSURGERY | Facility: CLINIC | Age: 64
End: 2019-04-25
Payer: COMMERCIAL

## 2019-04-25 VITALS
HEART RATE: 63 BPM | TEMPERATURE: 98 F | WEIGHT: 315 LBS | SYSTOLIC BLOOD PRESSURE: 120 MMHG | HEIGHT: 71 IN | DIASTOLIC BLOOD PRESSURE: 58 MMHG | BODY MASS INDEX: 44.1 KG/M2

## 2019-04-25 DIAGNOSIS — M48.061 SPINAL STENOSIS OF LUMBAR REGION, UNSPECIFIED WHETHER NEUROGENIC CLAUDICATION PRESENT: ICD-10-CM

## 2019-04-25 DIAGNOSIS — E66.01 MORBID OBESITY: ICD-10-CM

## 2019-04-25 DIAGNOSIS — Z98.1 HISTORY OF LUMBAR FUSION: Primary | ICD-10-CM

## 2019-04-25 PROCEDURE — 3074F PR MOST RECENT SYSTOLIC BLOOD PRESSURE < 130 MM HG: ICD-10-PCS | Mod: CPTII,S$GLB,, | Performed by: PHYSICIAN ASSISTANT

## 2019-04-25 PROCEDURE — 3074F SYST BP LT 130 MM HG: CPT | Mod: CPTII,S$GLB,, | Performed by: PHYSICIAN ASSISTANT

## 2019-04-25 PROCEDURE — 1101F PT FALLS ASSESS-DOCD LE1/YR: CPT | Mod: CPTII,S$GLB,, | Performed by: PHYSICIAN ASSISTANT

## 2019-04-25 PROCEDURE — 3008F PR BODY MASS INDEX (BMI) DOCUMENTED: ICD-10-PCS | Mod: CPTII,S$GLB,, | Performed by: PHYSICIAN ASSISTANT

## 2019-04-25 PROCEDURE — 3078F PR MOST RECENT DIASTOLIC BLOOD PRESSURE < 80 MM HG: ICD-10-PCS | Mod: CPTII,S$GLB,, | Performed by: PHYSICIAN ASSISTANT

## 2019-04-25 PROCEDURE — 99214 PR OFFICE/OUTPT VISIT, EST, LEVL IV, 30-39 MIN: ICD-10-PCS | Mod: S$GLB,,, | Performed by: PHYSICIAN ASSISTANT

## 2019-04-25 PROCEDURE — 99999 PR PBB SHADOW E&M-EST. PATIENT-LVL IV: CPT | Mod: PBBFAC,,, | Performed by: PHYSICIAN ASSISTANT

## 2019-04-25 PROCEDURE — 3008F BODY MASS INDEX DOCD: CPT | Mod: CPTII,S$GLB,, | Performed by: PHYSICIAN ASSISTANT

## 2019-04-25 PROCEDURE — 99214 OFFICE O/P EST MOD 30 MIN: CPT | Mod: S$GLB,,, | Performed by: PHYSICIAN ASSISTANT

## 2019-04-25 PROCEDURE — 1101F PR PT FALLS ASSESS DOC 0-1 FALLS W/OUT INJ PAST YR: ICD-10-PCS | Mod: CPTII,S$GLB,, | Performed by: PHYSICIAN ASSISTANT

## 2019-04-25 PROCEDURE — 3078F DIAST BP <80 MM HG: CPT | Mod: CPTII,S$GLB,, | Performed by: PHYSICIAN ASSISTANT

## 2019-04-25 PROCEDURE — 99999 PR PBB SHADOW E&M-EST. PATIENT-LVL IV: ICD-10-PCS | Mod: PBBFAC,,, | Performed by: PHYSICIAN ASSISTANT

## 2019-04-29 ENCOUNTER — PATIENT MESSAGE (OUTPATIENT)
Dept: NEUROSURGERY | Facility: CLINIC | Age: 64
End: 2019-04-29

## 2019-04-30 NOTE — PATIENT INSTRUCTIONS
[facility]  The [facility] is a comprehensive treatment approach for individuals with sub-acute - chronic neck or back pain. Treatment focuses on active recovery, restoring spinal motion and strength, and ultimately getting people back to the activities they enjoy. While receiving care, patients work with an integrated healthcare team including a Physician, Physical Therapists, and Health and Wellness Coaches. Patients also engage in an active physical therapy program focused on pain reduction and return to function, then transition to a Wellness Program for general strength maintenance.    What you should expect:  A thorough evaluation with one of our skilled Physical Therapists to identify musculoskeletal impairments and movement dysfunctions  Strength testing of the cervical and/or lumbar extensor muscles using medical machines that isolate the targeted muscle groups. These results will provide a baseline measure of your back or neck strength, help determine an appropriate starting exercise weight, and allow for objective measurement of your progress as you go through treatment. Please note that neck pain treatment is only offered in the [place] region at this time.  An active, evidenced-based, research-driven Physical Therapy program focused on neck and/or back pain reduction, targeted strengthening of the spinal and surrounding musculature, and education to promote understanding, prevention, and management of your pain.  Regular meetings with a Health  who will assist you with achieving your health and fitness goals  Enrollment into a wellness program after successful completion of Physical Therapy. Here you will work with a team of Wellness Coaches to help you maintain the strength and functional movement gains youve achieved with Physical Therapy.  Scient[employer]  Through progressive resistance strengthening and the principles of adaptive  overload, patients suffering from chronic back and neck pain experience improved muscle strength, disc health, increased connective tissue thickness, endurance, mobility, and function over the duration? of their treatment. As musculoskeletal function improves, symptoms diminish and their psychological outlook improves.    Over the last 25 years, the [facility], the [facility], and medical institutions around the world have published compelling research showing that specific spinal strengthening using the FDA-registered PHB Lumbar and Cervical Extension machines improves patient outcomes even after multiple failed attempts at other forms of treatment. With more than 75 published articles in peer-reviewed journals, the proof lies in both the research and patient testimonials.    How to Enroll  Give us a call at [phone] ([place]) or at [phone] ([place]) to schedule an initial appointment with our Physical Medicine and Rehabilitation Physician or Physical Therapists who will complete a thorough examination to determine if you may benefit from this treatment approach.

## 2019-04-30 NOTE — PROGRESS NOTES
Neurosurgery History & Physical    Patient ID: Arthur Moreno is a 64 y.o. male.    Chief Complaint   Patient presents with    Follow-up     After EMG. Patient rates pain 6/10        Review of Systems   Constitutional: Negative for activity change, chills, fatigue and unexpected weight change.   HENT: Negative for hearing loss, tinnitus, trouble swallowing and voice change.    Eyes: Negative for visual disturbance.   Respiratory: Negative for apnea, chest tightness and shortness of breath.    Cardiovascular: Negative for chest pain and palpitations.   Gastrointestinal: Negative for abdominal pain, constipation, diarrhea, nausea and vomiting.   Genitourinary: Negative for difficulty urinating, dysuria and frequency.   Musculoskeletal: Positive for arthralgias, back pain and myalgias. Negative for gait problem, neck pain and neck stiffness.   Skin: Negative for wound.   Neurological: Negative for dizziness, tremors, seizures, facial asymmetry, speech difficulty, weakness, light-headedness, numbness and headaches.   Psychiatric/Behavioral: Negative for confusion and decreased concentration.       Past Medical History:   Diagnosis Date    Amaurosis fugax of right eye 4/2014    resolved    Bilateral venous insufficiency     legs    Calculus of gallbladder without mention of cholecystitis or obstruction     Cardiac arrhythmia     has implanted loop recorder, palpitations. Hx dizziness on bending over    Cellulitis and abscess of leg April 2014    treated at wound care center, resolving    Coronary artery disease     denies chest pain, denies MI, no stents; sees Dr Grant    Estrogen excess 08/2017    Generalized osteoarthrosis, involving multiple sites     Hyperlipidemia     Hypertension     Hypertension 10/7/2015    Hypothyroidism     Knee pain     right far worse than left    Obesity, unspecified     Occlusion and stenosis of carotid artery without mention of cerebral infarction     Primary  osteoarthritis of right knee 08/2017    Sleep apnea with use of continuous positive airway pressure (CPAP)     Status post placement of implantable loop recorder     Thyroid disease     Transient arterial occlusion of retina     Transient visual loss     BOTH EYE  PER DR CAMPOVERDE  7/27/2015     Social History     Socioeconomic History    Marital status:      Spouse name: Not on file    Number of children: Not on file    Years of education: Not on file    Highest education level: Not on file   Occupational History    Not on file   Social Needs    Financial resource strain: Not hard at all    Food insecurity:     Worry: Never true     Inability: Never true    Transportation needs:     Medical: No     Non-medical: No   Tobacco Use    Smoking status: Never Smoker    Smokeless tobacco: Never Used   Substance and Sexual Activity    Alcohol use: Yes     Frequency: 4 or more times a week     Drinks per session: 3 or 4     Binge frequency: Less than monthly     Comment: daily  16 oz daily     Drug use: No    Sexual activity: Yes     Partners: Female   Lifestyle    Physical activity:     Days per week: 0 days     Minutes per session: 0 min    Stress: Only a little   Relationships    Social connections:     Talks on phone: More than three times a week     Gets together: Twice a week     Attends Confucianist service: Not on file     Active member of club or organization: Yes     Attends meetings of clubs or organizations: More than 4 times per year     Relationship status:    Other Topics Concern    Not on file   Social History Narrative    Not on file     Family History   Problem Relation Age of Onset    Diabetes Mother     Heart disease Mother     Alzheimer's disease Mother     Diabetes Father     Heart disease Father     Arthritis Sister     Arthritis Brother     Arthritis Brother     Arthritis Brother      Review of patient's allergies indicates:   Allergen Reactions    Lamisil  "[terbinafine] Rash     Emollient form only , also had Bactrim in it, not sure       Current Outpatient Medications:     aspirin (ECOTRIN) 81 MG EC tablet, Take 81 mg by mouth once daily. Patient held since 6/12/16 per md instructions, Disp: , Rfl:     atenolol (TENORMIN) 50 MG tablet, Take 1 tablet (50 mg total) by mouth once daily., Disp: 90 tablet, Rfl: 3    atorvastatin (LIPITOR) 40 MG tablet, TAKE 1 TABLET AT BEDTIME FOR CHOLESTEROL, Disp: 30 tablet, Rfl: 14    b complex vitamins capsule, Take 1 capsule by mouth once daily., Disp: , Rfl:     fosinopril (MONOPRIL) 40 MG tablet, TAKE 1 TABLET DAILY FOR BLOOD PRESSURE, Disp: 30 tablet, Rfl: 14    Lactobacillus rhamnosus GG (CULTURELLE) 10 billion cell capsule, Take 1 capsule by mouth once daily., Disp: , Rfl:     levothyroxine (SYNTHROID) 88 MCG tablet, Take 1 tablet (88 mcg total) by mouth before breakfast., Disp: 90 tablet, Rfl: 3    multivitamin (ONE DAILY MULTIVITAMIN) per tablet, Take 1 tablet by mouth once daily., Disp: , Rfl:     potassium chloride SA (K-DUR,KLOR-CON) 10 MEQ tablet, 2 PO DAILY, Disp: , Rfl:     torsemide (DEMADEX) 20 MG Tab, TAKE 1 TABLET DAILY IN THE MORNING FOR FLUID, Disp: 30 tablet, Rfl: 11    methylPREDNISolone (MEDROL DOSEPACK) 4 mg tablet, use as directed, Disp: 1 Package, Rfl: 0    Vitals:    04/25/19 1105   BP: (!) 120/58   Pulse: 63   Temp: 98.2 °F (36.8 °C)   Weight: (!) 148.4 kg (327 lb 2.6 oz)   Height: 5' 10.5" (1.791 m)       Physical Exam   Constitutional: He is oriented to person, place, and time. He appears well-developed and well-nourished.   HENT:   Head: Normocephalic and atraumatic.   Eyes: Pupils are equal, round, and reactive to light.   Neck: Normal range of motion. Neck supple.   Cardiovascular: Normal rate.   Pulmonary/Chest: Effort normal.   Abdominal: He exhibits no distension.   Musculoskeletal: Normal range of motion. He exhibits no edema.   Neurological: He is alert and oriented to person, place, " and time. He has a normal Finger-Nose-Finger Test, a normal Heel to Shin Test, a normal Romberg Test and a normal Tandem Gait Test. Gait normal.   Reflex Scores:       Tricep reflexes are 2+ on the right side and 2+ on the left side.       Bicep reflexes are 2+ on the right side and 2+ on the left side.       Brachioradialis reflexes are 2+ on the right side and 2+ on the left side.       Patellar reflexes are 2+ on the right side and 2+ on the left side.       Achilles reflexes are 2+ on the right side and 2+ on the left side.  Skin: Skin is warm and dry.   Psychiatric: He has a normal mood and affect. His speech is normal and behavior is normal. Judgment and thought content normal.   Nursing note and vitals reviewed.      Neurologic Exam     Mental Status   Oriented to person, place, and time.   Oriented to person.   Oriented to place.   Oriented to time.   Follows 3 step commands.   Attention: normal. Concentration: normal.   Speech: speech is normal   Level of consciousness: alert  Knowledge: consistent with education.   Able to name object. Able to read. Able to repeat. Able to write. Normal comprehension.     Cranial Nerves     CN II   Visual acuity: normal  Right visual field deficit: none  Left visual field deficit: none     CN III, IV, VI   Pupils are equal, round, and reactive to light.  Right pupil: Size: 3 mm. Shape: regular. Reactivity: brisk. Consensual response: intact.   Left pupil: Size: 3 mm. Shape: regular. Reactivity: brisk. Consensual response: intact.   CN III: no CN III palsy  CN VI: no CN VI palsy  Nystagmus: none   Diplopia: none  Ophthalmoparesis: none  Conjugate gaze: present    CN V   Right facial sensation deficit: none  Left facial sensation deficit: none    CN VII   Right facial weakness: none  Left facial weakness: none    CN VIII   Hearing: intact    CN IX, X   CN IX normal.   CN X normal.     CN XI   Right sternocleidomastoid strength: normal  Left sternocleidomastoid strength:  normal  Right trapezius strength: normal  Left trapezius strength: normal    CN XII   Fasciculations: absent  Tongue deviation: none    Motor Exam   Muscle bulk: normal  Overall muscle tone: normal  Right arm pronator drift: absent  Left arm pronator drift: absent    Strength   Right neck flexion: 5/5  Left neck flexion: 5/5  Right neck extension: 5/5  Left neck extension: 5/5  Right deltoid: 5/5  Left deltoid: 5/5  Right biceps: 5/5  Left biceps: 5/5  Right triceps: 5/5  Left triceps: 5/5  Right wrist flexion: 5/5  Left wrist flexion: 5/5  Right wrist extension: 5/5  Left wrist extension: 5/5  Right interossei: 5/5  Left interossei: 5/5  Right abdominals: 5/5  Left abdominals: 5/  Right iliopsoas: 5/  Left iliopsoas: 5/  Right quadriceps: 5/  Left quadriceps: 5/  Right hamstrin/5  Left hamstrin/5  Right glutei: 5/5  Left glutei: 5/5  Right anterior tibial: 5/5  Left anterior tibial: 5/5  Right posterior tibial: 5/5  Left posterior tibial: 5/5  Right peroneal: 5/5  Left peroneal: 5/5  Right gastroc: 5/5  Left gastroc: 5/    Sensory Exam   Right arm light touch: normal  Left arm light touch: normal  Right leg light touch: normal  Left leg light touch: normal  Right arm vibration: normal  Left arm vibration: normal  Right arm pinprick: normal  Left arm pinprick: normal    Gait, Coordination, and Reflexes     Gait  Gait: normal    Coordination   Romberg: negative  Finger to nose coordination: normal  Heel to shin coordination: normal  Tandem walking coordination: normal    Tremor   Resting tremor: absent  Intention tremor: absent  Action tremor: absent    Reflexes   Right brachioradialis: 2+  Left brachioradialis: 2+  Right biceps: 2+  Left biceps: 2+  Right triceps: 2+  Left triceps: 2+  Right patellar: 2+  Left patellar: 2+  Right achilles: 2+  Left achilles: 2+  Right Pittman: absent  Left Pittman: absent  Right ankle clonus: absent  Left ankle clonus: absent      Provider dictation:  64-year-old male  with history of lumbar fusion presents today for follow-up of persistent back and leg pain after undergoing imaging and nerve conduction/EMG testing.  He is status post 2016 L2-L5 decompression.  During his last visit he described axial back pain radiating into the bilateral legs for few months.  However since is last visit symptoms have improved some.  Pain is mainly focused in the lower back and hips at this point in time.  He does have some bilateral radicular leg pain, but states it is not significant.  He has not had any recent physical therapy or any epidural steroid injections.    He has 5/5 strength in the upper and lower extremities with no sensory deficits.    I have reviewed an MRI and CT of the lumbar spine from March 22, 2019 demonstrating L1-2 degenerative changes with central canal narrowing.  L5-S1 broad-based disc with some right foraminal narrowing.  There are normal postoperative fusion changes from L2-L5 with no hardware complications.    Scoliosis x-rays have been reviewed and reveal a decrease levoscoliosis in comparison to 2016 films.  There is a 15 degree levoscoliosis from superior endplate of T1 to inferior endplate of L3.    EMG nerve conductions test from 04/15/2019 revealed subacute on chronic right L4, L5, S1 and left L5-S1 denervation changes.  There is slight active denervation seen to the right at L4, L5, S1 and left L4.    In conclusion, Mr. Moreno has had L2-L5 lumbar fusion with some sdjacent level disease at L1/2.  Pain is greatest in the lower back and related to myofascil spasm/ mechanical back pain associated with weight and posture.  Although nerve testing has detected some denervation/ radiculopathy, he is not concerned about leg pain today as it has significantly improved.  To help with focal back pain, I recommend regular exercie, weight loss, good posture, consideration of PT and referral to PM&R.  He would like to try to manage pain on his own and will call if he wants  to proceed with [facility] or PM&R referral in the future.  Follow up as needed.    Visit Diagnosis:  History of lumbar fusion    Morbid obesity    Spinal stenosis of lumbar region, unspecified whether neurogenic claudication present        Total time spent counseling greater than fifty percent of total visit time.  Counseling included discussion regarding imaging findings, diagnosis possibilities, treatment options, risks and benefits.   The patient had many questions regarding the options and long-term effects.

## 2019-05-02 PROBLEM — Z79.82 ASPIRIN LONG-TERM USE: Status: ACTIVE | Noted: 2019-05-02

## 2019-05-02 PROBLEM — Z79.899 TAKING A STATIN MEDICATION: Status: ACTIVE | Noted: 2019-05-02

## 2019-05-17 DIAGNOSIS — M25.559 ARTHRALGIA OF HIP, UNSPECIFIED LATERALITY: Primary | ICD-10-CM

## 2019-05-20 ENCOUNTER — OFFICE VISIT (OUTPATIENT)
Dept: ORTHOPEDICS | Facility: CLINIC | Age: 64
End: 2019-05-20
Payer: MEDICARE

## 2019-05-20 ENCOUNTER — HOSPITAL ENCOUNTER (OUTPATIENT)
Dept: RADIOLOGY | Facility: HOSPITAL | Age: 64
Discharge: HOME | End: 2019-05-20
Attending: ORTHOPAEDIC SURGERY
Payer: COMMERCIAL

## 2019-05-20 VITALS — HEIGHT: 71 IN | BODY MASS INDEX: 44.1 KG/M2 | WEIGHT: 315 LBS

## 2019-05-20 DIAGNOSIS — M25.552 BILATERAL HIP PAIN: Primary | ICD-10-CM

## 2019-05-20 DIAGNOSIS — M25.551 BILATERAL HIP PAIN: Primary | ICD-10-CM

## 2019-05-20 DIAGNOSIS — M25.559 ARTHRALGIA OF HIP, UNSPECIFIED LATERALITY: ICD-10-CM

## 2019-05-20 DIAGNOSIS — M16.0 PRIMARY OSTEOARTHRITIS OF BOTH HIPS: ICD-10-CM

## 2019-05-20 DIAGNOSIS — M47.26 OSTEOARTHRITIS OF SPINE WITH RADICULOPATHY, LUMBAR REGION: ICD-10-CM

## 2019-05-20 PROCEDURE — 73521 XR HIPS BILATERAL 2 VIEW INCL AP PELVIS: ICD-10-PCS | Mod: 26,,, | Performed by: RADIOLOGY

## 2019-05-20 PROCEDURE — 73521 X-RAY EXAM HIPS BI 2 VIEWS: CPT | Mod: 26,,, | Performed by: RADIOLOGY

## 2019-05-20 PROCEDURE — 99999 PR PBB SHADOW E&M-EST. PATIENT-LVL III: ICD-10-PCS | Mod: PBBFAC,,, | Performed by: ORTHOPAEDIC SURGERY

## 2019-05-20 PROCEDURE — 99999 PR PBB SHADOW E&M-EST. PATIENT-LVL III: CPT | Mod: PBBFAC,,, | Performed by: ORTHOPAEDIC SURGERY

## 2019-05-20 PROCEDURE — 97760 ORTHOTIC MGMT&TRAING 1ST ENC: CPT | Mod: GP,,, | Performed by: ORTHOPAEDIC SURGERY

## 2019-05-20 PROCEDURE — 99213 OFFICE O/P EST LOW 20 MIN: CPT | Mod: 25,S$PBB,, | Performed by: ORTHOPAEDIC SURGERY

## 2019-05-20 PROCEDURE — 73521 X-RAY EXAM HIPS BI 2 VIEWS: CPT | Mod: TC,PO

## 2019-05-20 PROCEDURE — 99213 OFFICE O/P EST LOW 20 MIN: CPT | Mod: PBBFAC,25,PN | Performed by: ORTHOPAEDIC SURGERY

## 2019-05-20 PROCEDURE — 99213 PR OFFICE/OUTPT VISIT, EST, LEVL III, 20-29 MIN: ICD-10-PCS | Mod: 25,S$PBB,, | Performed by: ORTHOPAEDIC SURGERY

## 2019-05-20 PROCEDURE — 97760 PR ORTHOTIC MGMT&TRAINJ INITIAL ENC EA 15 MINS: ICD-10-PCS | Mod: GP,,, | Performed by: ORTHOPAEDIC SURGERY

## 2019-05-20 NOTE — PROGRESS NOTES
HISTORY OF PRESENT ILLNESS:  A 64 years old with lower back pain for a few   months' time, had a lumbar spine fusion a couple of years ago, has pain in his   low back and into his hip area on the right side more than the left.    Examination shows some tenderness to lower lumbar spine.  Hip range of motion is   painless, nontender at greater trochanter.    X-rays show a lumbar spine fusion well preserved joint space of the hip.    ASSESSMENT:  Lumbar radicular symptoms.    PLAN:  Physical therapy, lumbar corset.  Followup as needed.      MEGAN/MITZI  dd: 05/20/2019 09:18:18 (CDT)  td: 05/21/2019 01:36:04 (CDT)  Doc ID   #[id number]  Job ID #[id number]    CC:     We performed a custom orthotic/brace fitting, adjusting and training with the patient. The patient demonstrated understanding and proper care. This was performed for 15 minutes.

## 2019-05-24 ENCOUNTER — CLINICAL SUPPORT (OUTPATIENT)
Dept: REHABILITATION | Facility: HOSPITAL | Age: 64
End: 2019-05-24
Attending: ORTHOPAEDIC SURGERY
Payer: MEDICARE

## 2019-05-24 DIAGNOSIS — Z78.9 DECREASED ACTIVITIES OF DAILY LIVING (ADL): ICD-10-CM

## 2019-05-24 PROCEDURE — 97140 MANUAL THERAPY 1/> REGIONS: CPT | Mod: PO

## 2019-05-24 PROCEDURE — 97161 PT EVAL LOW COMPLEX 20 MIN: CPT | Mod: PO

## 2019-05-24 PROCEDURE — 97110 THERAPEUTIC EXERCISES: CPT | Mod: PO

## 2019-05-26 PROBLEM — Z78.9 DECREASED ACTIVITIES OF DAILY LIVING (ADL): Status: ACTIVE | Noted: 2019-05-26

## 2019-05-27 ENCOUNTER — LAB VISIT (OUTPATIENT)
Dept: LAB | Facility: HOSPITAL | Age: 64
End: 2019-05-27
Attending: INTERNAL MEDICINE
Payer: MEDICARE

## 2019-05-27 ENCOUNTER — CLINICAL SUPPORT (OUTPATIENT)
Dept: REHABILITATION | Facility: HOSPITAL | Age: 64
End: 2019-05-27
Attending: ORTHOPAEDIC SURGERY
Payer: MEDICARE

## 2019-05-27 DIAGNOSIS — R94.5 ABNORMAL RESULTS OF LIVER FUNCTION STUDIES: ICD-10-CM

## 2019-05-27 DIAGNOSIS — Z78.9 DECREASED ACTIVITIES OF DAILY LIVING (ADL): ICD-10-CM

## 2019-05-27 DIAGNOSIS — D69.6 THROMBOCYTOPENIA: ICD-10-CM

## 2019-05-27 LAB
ALBUMIN SERPL BCP-MCNC: 4.4 G/DL (ref 3.5–5.2)
ALP SERPL-CCNC: 110 U/L (ref 38–145)
ALT SERPL W/O P-5'-P-CCNC: 34 U/L (ref 10–44)
ANION GAP SERPL CALC-SCNC: 11 MMOL/L (ref 8–16)
AST SERPL-CCNC: 53 U/L (ref 17–59)
BASOPHILS # BLD AUTO: 0.04 K/UL (ref 0–0.2)
BASOPHILS NFR BLD: 0.6 % (ref 0–1.9)
BILIRUB SERPL-MCNC: 0.8 MG/DL (ref 0.2–1.3)
BUN SERPL-MCNC: 41 MG/DL (ref 9–21)
CALCIUM SERPL-MCNC: 9.8 MG/DL (ref 8.4–10.2)
CHLORIDE SERPL-SCNC: 102 MMOL/L (ref 95–110)
CO2 SERPL-SCNC: 28 MMOL/L (ref 22–31)
CREAT SERPL-MCNC: 1.33 MG/DL (ref 0.5–1.4)
DIFFERENTIAL METHOD: ABNORMAL
EOSINOPHIL # BLD AUTO: 0.4 K/UL (ref 0–0.5)
EOSINOPHIL NFR BLD: 6.1 % (ref 0–8)
ERYTHROCYTE [DISTWIDTH] IN BLOOD BY AUTOMATED COUNT: 11.9 % (ref 11.5–14.5)
EST. GFR  (AFRICAN AMERICAN): >60 ML/MIN/1.73 M^2
EST. GFR  (NON AFRICAN AMERICAN): 56 ML/MIN/1.73 M^2
FOLATE SERPL-MCNC: >20 NG/ML (ref 4–24)
GLUCOSE SERPL-MCNC: 93 MG/DL (ref 70–110)
HAV IGM SERPL QL IA: NEGATIVE
HBV CORE IGM SERPL QL IA: NEGATIVE
HBV SURFACE AG SERPL QL IA: NEGATIVE
HCT VFR BLD AUTO: 42.1 % (ref 40–54)
HCV AB SERPL QL IA: NEGATIVE
HGB BLD-MCNC: 14.5 G/DL (ref 14–18)
IMM GRANULOCYTES # BLD AUTO: 0.03 K/UL (ref 0–0.04)
IMM GRANULOCYTES NFR BLD AUTO: 0.5 % (ref 0–0.5)
LYMPHOCYTES # BLD AUTO: 1.5 K/UL (ref 1–4.8)
LYMPHOCYTES NFR BLD: 22.7 % (ref 18–48)
MCH RBC QN AUTO: 35.4 PG (ref 27–31)
MCHC RBC AUTO-ENTMCNC: 34.4 G/DL (ref 32–36)
MCV RBC AUTO: 103 FL (ref 82–98)
MONOCYTES # BLD AUTO: 0.9 K/UL (ref 0.3–1)
MONOCYTES NFR BLD: 13 % (ref 4–15)
NEUTROPHILS # BLD AUTO: 3.8 K/UL (ref 1.8–7.7)
NEUTROPHILS NFR BLD: 57.1 % (ref 38–73)
NRBC BLD-RTO: 0 /100 WBC
PLATELET # BLD AUTO: 121 K/UL (ref 150–350)
PMV BLD AUTO: 11.2 FL (ref 9.2–12.9)
POTASSIUM SERPL-SCNC: 4.9 MMOL/L (ref 3.5–5.1)
PROT SERPL-MCNC: 7.2 G/DL (ref 6–8.4)
RBC # BLD AUTO: 4.1 M/UL (ref 4.6–6.2)
SODIUM SERPL-SCNC: 141 MMOL/L (ref 136–145)
VIT B12 SERPL-MCNC: 490 PG/ML (ref 210–950)
WBC # BLD AUTO: 6.61 K/UL (ref 3.9–12.7)

## 2019-05-27 PROCEDURE — 97110 THERAPEUTIC EXERCISES: CPT | Mod: PO

## 2019-05-27 PROCEDURE — 80074 ACUTE HEPATITIS PANEL: CPT

## 2019-05-27 PROCEDURE — 97035 APP MDLTY 1+ULTRASOUND EA 15: CPT | Mod: PO

## 2019-05-27 PROCEDURE — 85025 COMPLETE CBC W/AUTO DIFF WBC: CPT | Mod: PN

## 2019-05-27 PROCEDURE — 82607 VITAMIN B-12: CPT

## 2019-05-27 PROCEDURE — 80074 ACUTE HEPATITIS PANEL: CPT | Mod: PN

## 2019-05-27 PROCEDURE — 36415 COLL VENOUS BLD VENIPUNCTURE: CPT | Mod: PN

## 2019-05-27 PROCEDURE — 82746 ASSAY OF FOLIC ACID SERUM: CPT | Mod: PN

## 2019-05-27 PROCEDURE — 85025 COMPLETE CBC W/AUTO DIFF WBC: CPT

## 2019-05-27 PROCEDURE — 82607 VITAMIN B-12: CPT | Mod: PN

## 2019-05-27 PROCEDURE — 82746 ASSAY OF FOLIC ACID SERUM: CPT

## 2019-05-27 PROCEDURE — 97140 MANUAL THERAPY 1/> REGIONS: CPT | Mod: PO

## 2019-05-27 PROCEDURE — 80053 COMPREHEN METABOLIC PANEL: CPT | Mod: PN

## 2019-05-27 PROCEDURE — 80053 COMPREHEN METABOLIC PANEL: CPT

## 2019-05-27 NOTE — PROGRESS NOTES
"  Physical Therapy Daily Treatment Note     Name: Arthur Moreno  Clinic Number: [id number]    Therapy Diagnosis:   Encounter Diagnosis   Name Primary?    Decreased activities of daily living (ADL)      Physician: Elias Cerda MD    Visit Date: 5/27/2019  Physician Orders: PT Eval and Treat   Medical Diagnosis from Referral: M47.26 (ICD-10-CM) - Osteoarthritis of spine with radiculopathy, lumbar region  Evaluation Date: 5/24/2019  Authorization Period Expiration: 12/31/19  Plan of Care Expiration: 07/11/19  Visit # / Visits authorized: 2 / 20    Time In: 0650  Time Out: 0750  Total Billable Time: 53 minutes    Precautions:pacemaker and multiple back procedures, RIGHT TKA     Subjective     Pt reports: SI technique has decreased pain "for a while".    He was compliant with home exercise program.  Response to previous treatment: Trying to use techniques  Functional change: n/a    Pain: 6/10 walking in vs 2/10 when supine or with SI techniques  Location: right SI      Objective     Arthur received therapeutic exercises to develop strength, ROM and core stabilization for 45 minutes including:  PROM of LE's  Lumbar stabilization exercise with slight FLEXION bias:              UE patterns with 4#,  3 x 10 each              LE foot lift and away   Opposite arm and leg   Bridging   Leg press:   Bilateral @ 60,80#, 3 x 10 each  Sit to stand with straight back bend  Abdominal exercise with straight back bend on chest press machine, 30# x 5    Arthur received the following manual therapy techniques: SI PUSH AWAY were applied to the: LEFT SI for 8 minutes    Arthur received the following direct contact modalities after being cleared for contraindications: Ultrasound:  Arthur received ultrasound to manage pain and inflammation at 100 % duty cycle applied to the RIGHT and LEFT SI areas at an intensity of 1.2W/cm2  for a duration of 10 minutes. Patient tolerated treatment well without adverse effects. Therapist was in attendance " "[facility]  ED Nurse Handoff Report    Modesta Bravo is a 50 year old female   ED Chief complaint: Fever and Nausea  . ED Diagnosis:   Final diagnoses:   Endometritis     Allergies: No Known Allergies    Code Status: Full Code  Activity level - Baseline/Home:  Independent. Activity Level - Current:   Stand with Assist. Lift room needed: No. Bariatric: No   Needed: No   Isolation: NO. Infection: Not Applicable.     Vital Signs:   Vitals:    07/26/17 1942 07/26/17 1950 07/26/17 1955 07/26/17 2010   BP:   127/79 137/80   Resp:       Temp:  100.1  F (37.8  C)     TempSrc:  Oral     SpO2: 93%  90% 92%   Weight:       Height:           Cardiac Rhythm:  ,      Pain level: 0-10 Pain Scale: 8  Patient confused: No. Patient Falls Risk: Yes.   Elimination Status: Has voided   Patient Report - Initial Complaint: Fever, abdominal cramping and vaginal bleeding, nausea. Pt has uterine ablation 13 days ago for continued heavy menstrual periods. Pt began \"not feeling well\" for the past few days, has been experiencing increased abdominal cramping; today developed fever and foul smelling vaginal odor.   Focused Assessment: Nausea, lower abdominal cramping, foul smelling vaginal odor.  Pt tachycardic upon arrival; in 120's. HR down to low 100's at this time.  Tests Performed: Labs, UA, ultrasound, CT.   Abnormal Results: Lactic WDL, blood cultures collected  Labs Ordered and Resulted from Time of ED Arrival Up to the Time of Departure from the ED   CBC WITH PLATELETS DIFFERENTIAL - Abnormal; Notable for the following:        Result Value    WBC 20.4 (*)     Absolute Neutrophil 18.7 (*)     All other components within normal limits   COMPREHENSIVE METABOLIC PANEL - Abnormal; Notable for the following:     ALT 60 (*)     All other components within normal limits   ROUTINE UA WITH MICROSCOPIC - Abnormal; Notable for the following:     Blood Urine Moderate (*)     Leukocyte Esterase Urine Moderate (*)     WBC " Urine 8 (*)     RBC Urine 8 (*)     Mucous Urine Present (*)     All other components within normal limits   LACTIC ACID WHOLE BLOOD   PULSE OXIMETRY NURSING   CARDIAC CONTINUOUS MONITORING   MEASURE URINE OUTPUT   PATIENT CARE ORDER   URINE CULTURE AEROBIC BACTERIAL   BLOOD CULTURE   WET PREP   NEISSERIA GONORRHOEAE PCR   CHLAMYDIA TRACHOMATIS PCR   WOUND CULTURE AEROBIC BACTERIAL   GRAM STAIN   BLOOD CULTURE     .   Treatments provided: IV fluids: LR Bolus 2448 mL. Ibuprofen for temp, two doses dilaudid for pain, zofran for nausea, Zosyn and Vanco administered  Family Comments:.  OBS brochure/video discussed/provided to patient:  No  ED Medications:   Medications   lactated ringers infusion (not administered)   ondansetron (ZOFRAN) injection 4 mg (4 mg Intravenous Given 7/26/17 1659)   HYDROmorphone (PF) (DILAUDID) injection 0.5 mg (0.5 mg Intravenous Given 7/26/17 1945)   0.9% sodium chloride BOLUS (1,000 mLs Intravenous Not Given 7/26/17 2022)     Followed by   0.9% sodium chloride infusion (1,000 mLs Intravenous Not Given 7/26/17 2024)   iohexol (OMNIPAQUE) solution 25 mL (25 mLs Oral Given 7/26/17 1833)   lactated ringers BOLUS 2,448 mL (0 mLs Intravenous Stopped 7/26/17 2022)   piperacillin-tazobactam (ZOSYN) intermittent infusion 4.5 g (0 g Intravenous Stopped 7/26/17 1854)   vancomycin (VANCOCIN) 1500 mg in 0.9% NaCl 250 mL PREMIX (1,500 mg Intravenous New Bag 7/26/17 1821)   ibuprofen (ADVIL/MOTRIN) 600 MG tablet (600 mg  Given 7/26/17 1804)   0.9% sodium chloride BOLUS (0 mLs Intravenous Stopped 7/26/17 1926)   iopamidol (ISOVUE-370) solution 500 mL (91 mLs Intravenous Given 7/26/17 1925)     Drips infusing:  Yes; Vanco may still be running upon admission  For the majority of the shift this patient was Green. Interventions performed were N/A.     Severe Sepsis OR Septic Shock Diagnosis Present: No      ED Nurse Name/Phone Number: Abigail Charu,   8:25 PM    RECEIVING UNIT ED HANDOFF REVIEW    Above ED  throughout intervention.    Home Exercises Provided and Patient Education Provided  Education provided:   - Importance of LE strength    Written Home Exercises Provided: Patient instructed to cont prior HEP.  Exercises were reviewed and Arthur was able to demonstrate them prior to the end of the session.  Arthur demonstrated good  understanding of the education provided.     See EMR under Media for exercises provided prior visit.    Assessment     Pt has decreased pain with exercise, heel lift , and SI belt    Arthur is progressing well towards his goals.   Pt prognosis is Excellent.     Pt will continue to benefit from skilled outpatient physical therapy to address the deficits listed in the problem list box on initial evaluation, provide pt/family education and to maximize pt's level of independence in the home and community environment.     Pt's spiritual, cultural and educational needs considered and pt agreeable to plan of care and goals.    Anticipated barriers to physical therapy: None    Goals:   Short Term Goals: 3 weeks   Pt will use lumbar stabilization exercises independently as indicated  Pt will increase functional strength of LE's  SI pain will decrease to 1-2/10 with techniques  Long Term Goals: 8 weeks   Pt will use straight back bend technique in ADL  Pt will have improved functional LE strength and endurance    Plan     Lumbar stabilization exercises as indicated  Monitor heel lift and SI belt  LE closed chain exercise   ADL drills with straight back bend  Ultrasound to SI PRLATISHA Méndez, PT   Nurse Handoff Report was reviewed: Yes  Reviewed by: Otilia Gray on July 26, 2017 at 9:10 PM

## 2019-05-27 NOTE — PLAN OF CARE
[facility]  Physical Therapy Initial Evaluation    Name: Arthur Moreno  Clinic Number: [id number]    Therapy Diagnosis:   Encounter Diagnosis   Name Primary?    Decreased activities of daily living (ADL)      Physician: Elias Cerda MD    Physician Orders: PT Eval and Treat   Medical Diagnosis from Referral: M47.26 (ICD-10-CM) - Osteoarthritis of spine with radiculopathy, lumbar region  Evaluation Date: 5/24/2019  Authorization Period Expiration: 12/31/19  Plan of Care Expiration: 07/11/19  Visit # / Visits authorized: 1 / 20    Time In: 0900  Time Out: 1000  Total Billable Time: 50 minutes    Precautions: pacemaker and multiple back procedures, RIGHT TKA    Subjective   Date of onset: 1/19  History of current condition - Arthur reports: Gradual onset of RIGHT lumbar/ buttock pain began without critical incident. He late relates possible lifting of his dog may have been an issue.   Pt now has occasional RIGHT anterior thigh involvement. He denies bowel / bladder changes.     Past Medical History:   Diagnosis Date    Amaurosis fugax of right eye 4/2014    resolved    Bilateral venous insufficiency     legs    Calculus of gallbladder without mention of cholecystitis or obstruction     Cardiac arrhythmia     has implanted loop recorder, palpitations. Hx dizziness on bending over    Cellulitis and abscess of leg April 2014    treated at wound care center, resolving    Coronary artery disease     denies chest pain, denies MI, no stents; sees Dr Grant    Estrogen excess 08/2017    Generalized osteoarthrosis, involving multiple sites     Hyperlipidemia     Hypertension     Hypertension 10/7/2015    Hypothyroidism     Knee pain     right far worse than left    Obesity, unspecified     Occlusion and stenosis of carotid artery without mention of cerebral infarction     Primary osteoarthritis of right knee 08/2017    Sleep apnea with use of continuous positive airway  "pressure (CPAP)     Status post placement of implantable loop recorder     Thyroid disease     Transient arterial occlusion of retina     Transient visual loss     BOTH EYE  PER DR CAMPOVERDE  7/27/2015     Arthur Moreno  has a past surgical history that includes Knee arthroscopy (Bilateral); Cholecystectomy; Rotator cuff repair; Carpal tunnel release; Laparoscopic gastric banding; Polypectomy; Cardiac surgery (2013?); Cardiac surgery (2014); LHC; EVLT (Right); Back surgery (N/A); Lumbar epidural injection; Hiatal hernia repair (2011); vocal cord surgery (1975); Rhizotomy w/ radiofrequency ablation (2016); injection SI joint (7/201); and Joint replacement.    Arthur has a current medication list which includes the following prescription(s): aspirin, atenolol, atorvastatin, b complex vitamins, fluticasone propionate, fosinopril, lactobacillus rhamnosus gg, levothyroxine, multivitamin, potassium chloride sa, and torsemide.    Review of patient's allergies indicates:   Allergen Reactions    Lamisil [terbinafine] Rash     Emollient form only , also had Bactrim in it, not sure         Imaging, CT scan films (4/2/19): Decreased levoscoliosis of the thoracolumbar spine compared to prior study from 06/16/2016 now with You angle of approximately 15 degrees.      X-ray (4/2/19): 1. Postsurgical changes of L2-5 posterior fusion with left L2 hemilaminectomy at L4 laminectomy.  No evidence of hardware complication.  2. Mild spinal canal stenosis at the junctional L1-2 level.    Prior Therapy: None this year  Social History: Pt lives with their spouse  Occupation: Semi-retired   Prior Level of Function: "Doing well" with back  Current Level of Function: Difficulty standing, walking, bending to reach or lift    Pain:  Current 2/10, worst 8/10, best 0/10   Location: right back , buttocks  and anterior thigh   Description: Dull and Sharp  Aggravating Factors: Standing, Bending and Walking  Easing Factors: rest and " sitting in recliner chair    Pts goals: Decrease pain and resume ADL    Objective     Posture: Slight FLEXION bias with forward trunk and RIGHT PSIS, crest, and gluteal fold higher    ROM:   Lumbar    ACTIVE    FLEXION 70 degrees    EXTENSION 20 degrees    SIDE BEND   LEFT 15 degrees                           RIGHT 10 degrees Increased pain on RIGHT     Strength:     5/5 gross strength of LE's    Gait/Balance: Decreased stride, fair / poor single leg balance    Neuro: Gross touch sensation reported intact distally    Special Tests: Slight FLEXION bias seems best    Palpation: Area of discomfort pointed at RIGHT greater than LEFT SI      CMS Impairment/Limitation/Restriction for FOTO Lumbar Survey    Therapist reviewed FOTO scores for Arthur Moreno on 2019.   FOTO documents entered into [employer] - see Media section.    Limitation Score: 60%  Category: Carrying    Current : CL = least 60% but < 80% impaired, limited or restricted  Goal: CK = at least 40% but < 60% impaired, limited or restricted         TREATMENT   Treatment Time In: 925  Treatment Time Out: 950  Total Treatment time separate from Evaluation: 25 minutes    Arthur received therapeutic exercises to develop ROM and core stabilization for 10 minutes including:  PROM of LE's  Lumbar stabilization exercise with slight FLEXION bias:   UE patterns with 4#,  3 x 10 each   LE foot lift and lower    Arthur received the following manual therapy techniques: Joint mobilizations and fitting / instruction with SI belt and heel lift were applied for 15 minutes, includin mm heel lift to LEFT  SI belt for trial  SI technique with RIGHT PUSH AWAY/ PULL TOWARD 3 SECONDS X 3 PRN  Side-lying and sitting with slight FLEXION bias for positions of comfort    Arthur deferreded the following direct contact modalities after being cleared for contraindications: Ultrasound:  Arthur received ultrasound to manage pain and inflammation at 100 % duty cycle applied to the SI area  bilaterally at an intensity of  1.2W/cm2 W/cm2  for a duration of 10 minutes. Patient tolerated treatment well without adverse effects. Therapist was in attendance throughout intervention.    Home Exercises and Patient Education Provided  Education provided:   - Position and posture relating to anatomy    Written Home Exercises Provided: yes.  Exercises were reviewed and Arthur was able to demonstrate them prior to the end of the session.  Arthur demonstrated good  understanding of the education provided.     See EMR under Media for exercises provided 5/24/2019.    Assessment   Arthur is a 64 y.o. male referred to outpatient Physical Therapy with a medical diagnosis of M47.26 (ICD-10-CM) - Osteoarthritis of spine with radiculopathy, lumbar region  . Pt presents with RIGHT > LEFT SI pain and decreased ADL    Pt prognosis is Excellent.   Pt will benefit from skilled outpatient Physical Therapy to address the deficits stated above and in the chart below, provide pt/family education, and to maximize pt's level of independence.     Plan of care discussed with patient: Yes  Pt's spiritual, cultural and educational needs considered and patient is agreeable to the plan of care and goals as stated below:     Anticipated Barriers for therapy: None    Medical Necessity is demonstrated by the following  History  Co-morbidities and personal factors that may impact the plan of care Co-morbidities:   CAD and prior lumbar surgery    Personal Factors:   no deficits     low   Examination  Body Structures and Functions, activity limitations and participation restrictions that may impact the plan of care Body Regions:   back  lower extremities    Body Systems:    ROM  strength  motor control    Participation Restrictions:   None    Activity limitations:   Learning and applying knowledge  no deficits    General Tasks and Commands  no deficits    Communication  no deficits    Mobility  lifting and carrying objects    Self care  no  deficits    Domestic Life  cooking  doing house work (cleaning house, washing dishes, laundry)    Interactions/Relationships  no deficits    Life Areas  no deficits    Community and Social Life  no deficits         low   Clinical Presentation stable and uncomplicated low   Decision Making/ Complexity Score: low     Goals:  Short Term Goals: 3 weeks   Pt will use lumbar stabilization exercises independently as indicated  Pt will increase functional strength of LE's  SI pain will decrease to 1-2/10 with techniques  Long Term Goals: 8 weeks   Pt will use straight back bend technique in ADL  Pt will have improved functional LE strength and endurance    Plan   Plan of care Certification: 5/24/2019 to 07/11/19.    Outpatient Physical Therapy 3 times weekly for 8 weeks to include the following interventions: Manual Therapy, Neuromuscular Re-ed, Therapeutic Activites, Therapeutic Exercise and Ultrasound.     Michael Méndez, PT

## 2019-05-29 ENCOUNTER — CLINICAL SUPPORT (OUTPATIENT)
Dept: REHABILITATION | Facility: HOSPITAL | Age: 64
End: 2019-05-29
Attending: INTERNAL MEDICINE
Payer: MEDICARE

## 2019-05-29 DIAGNOSIS — Z78.9 DECREASED ACTIVITIES OF DAILY LIVING (ADL): ICD-10-CM

## 2019-05-29 PROCEDURE — 97110 THERAPEUTIC EXERCISES: CPT | Mod: PO

## 2019-05-29 NOTE — PROGRESS NOTES
"  Physical Therapy Daily Treatment Note     Name: Arthur Moreno  Clinic Number: [id number]    Therapy Diagnosis:   Encounter Diagnosis   Name Primary?    Decreased activities of daily living (ADL)      Physician: Elias Cerda MD    Visit Date: 5/29/2019  Physician Orders: PT Eval and Treat   Medical Diagnosis from Referral: M47.26 (ICD-10-CM) - Osteoarthritis of spine with radiculopathy, lumbar region  Evaluation Date: 5/24/2019  Authorization Period Expiration: 12/31/19  Plan of Care Expiration: 07/11/19  Visit # / Visits authorized: 3 / 20    Time In: 0845  Time Out: 0940  Total Billable Time: 55 minutes    Precautions:pacemaker and multiple back procedures, RIGHT TKA     Subjective     Pt reports: Decreased pain after moderate walk that "would have been painful sooner".    He was compliant with home exercise program.  Response to previous treatment: Doing exercises and SI techniques, "Getting better"  Functional change: Walking longer and "moving around better"    Pain: 1-2 /10 walking in, 0-1/10 with exercise / position  Location: RIGHT > LEFT SI      Objective     Arthur received therapeutic exercises to develop strength, ROM and core stabilization for 55 minutes including:  PROM of LE's with instruction in self-stretch gluteal and hip joint   Lumbar stabilization exercise with slight FLEXION bias:              UE patterns with 4#,  3 x 10 each              LE foot lift and away   Opposite arm and leg   Bridging   UE pull down and pull back with7# 2 x 10 each, maintaining posture  Leg press:   Bilateral @ 80,90#, 3 x 10 each and 60 degrees knee FLEXION      @ 80# and 80 degrees knee FLEXION, 2 X 10   Single leg @ 50# and 80 degrees knee FLEXION 2 x 10 each leg  DEFERRED TODAY:  Sit to stand with straight back bend  Abdominal exercise with straight back bend on chest press machine, 30# x 5    Arthur received the following manual therapy techniques: SI PUSH AWAY were applied to the: LEFT SI for 8 " minutes    Arthur deferred the following direct contact modalities after being cleared for contraindications: Ultrasound:  Arthur received ultrasound to manage pain and inflammation at 100 % duty cycle applied to the RIGHT and LEFT SI areas at an intensity of 1.2W/cm2  for a duration of 10 minutes. Patient tolerated treatment well without adverse effects. Therapist was in attendance throughout intervention.    Home Exercises Provided and Patient Education Provided  Education provided:   - Importance of LE strength    Written Home Exercises Provided: Patient instructed to cont prior HEP.  Exercises were reviewed and Arthur was able to demonstrate them prior to the end of the session.  Arthur demonstrated good  understanding of the education provided.     See EMR under Media for exercises provided prior visit.    Assessment     Pt has decreased pain with exercise, heel lift , and SI belt  He is able to decrease pain from 2/10 to 1/10 with position and SI technique    Arthur is progressing well towards his goals.   Pt prognosis is Excellent.     Pt will continue to benefit from skilled outpatient physical therapy to address the deficits listed in the problem list box on initial evaluation, provide pt/family education and to maximize pt's level of independence in the home and community environment.     Pt's spiritual, cultural and educational needs considered and pt agreeable to plan of care and goals.    Anticipated barriers to physical therapy: None    Goals:   Short Term Goals: 3 weeks   Pt will use lumbar stabilization exercises independently as indicated  Pt will increase functional strength of LE's  SI pain will decrease to 1-2/10 with techniques  Long Term Goals: 8 weeks   Pt will use straight back bend technique in ADL  Pt will have improved functional LE strength and endurance    Plan     Lumbar stabilization exercises as indicated  Monitor heel lift and SI belt  LE closed chain exercise   ADL drills with straight  back bend  Ultrasound to SI XAVIER Méndez, PT

## 2019-05-31 ENCOUNTER — CLINICAL SUPPORT (OUTPATIENT)
Dept: REHABILITATION | Facility: HOSPITAL | Age: 64
End: 2019-05-31
Attending: ORTHOPAEDIC SURGERY
Payer: MEDICARE

## 2019-05-31 DIAGNOSIS — Z78.9 DECREASED ACTIVITIES OF DAILY LIVING (ADL): ICD-10-CM

## 2019-05-31 PROCEDURE — 97530 THERAPEUTIC ACTIVITIES: CPT | Mod: PO

## 2019-05-31 PROCEDURE — 97110 THERAPEUTIC EXERCISES: CPT | Mod: PO

## 2019-05-31 PROCEDURE — 97035 APP MDLTY 1+ULTRASOUND EA 15: CPT | Mod: PO

## 2019-05-31 NOTE — PROGRESS NOTES
"  Physical Therapy Daily Treatment Note     Name: Arthur Moreno  Clinic Number: [id number]    Therapy Diagnosis:   Encounter Diagnosis   Name Primary?   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  Response to previous treatment: Doing exercises and SI techniques, "Getting better"  Functional change: Walking longer and "moving around better"    Pain: 2-3 /10 walking in, 1-2/10 with exercise / position  Location: RIGHT > LEFT SI      Objective     Arthur received therapeutic exercises to develop strength, ROM and core stabilization for 15 minutes including:  PROM of LE's with instruction in self-stretch gluteal and hip joint   Lumbar stabilization exercise with slight FLEXION bias:              UE patterns with 4#,  3 x 10 each              LE foot lift and away   Opposite arm and leg   Bridging   Leg press:   Bilateral @ 50,70#, 3 x 10 each and 60 degrees knee FLEXION    Single leg @ 50# and 80 degrees knee FLEXION 2 x 10 each leg  DEFERRED TODAY:  Abdominal exercise with straight back bend on chest press machine, 30# x 5  UE pull down and pull back with7# 2 x 10 each, maintaining posture    Arthur received therapeutic activity to improve body mechanics for 20 minutes including:  Sit to stand with straight back bend  Lift crate with straight back bend  Lift and step for placement of " crate  Step with opposite foot pivot for turning    Arthur deferred the following manual therapy techniques: SI PUSH AWAY were applied to the: LEFT SI for 8 minutes    Arthur received the following direct contact modalities after being cleared for contraindications: Ultrasound:  Arthur received ultrasound to manage pain and inflammation at 100 % duty cycle applied to the RIGHT and LEFT SI areas at an intensity of 1.2W/cm2  for a duration of 10 minutes. Patient tolerated treatment well without adverse effects. Therapist was in attendance throughout intervention.    Home Exercises Provided and Patient Education Provided  Education provided:   - Importance of LE strength    Written Home Exercises Provided: Patient instructed to cont prior HEP.  Exercises were reviewed and Arthur was able to demonstrate them prior to the end of the session.  Arthur demonstrated good  understanding of the education provided.     See EMR under Media for exercises provided prior visit.    Assessment     Pt has decreased pain with exercise, heel lift  He is able to decrease pain from 2/10 to 1/10 with position and SI technique  Pt needs more work on body mechanics    Arthur is progressing well towards his goals.   Pt prognosis is Excellent.     Pt will continue to benefit from skilled outpatient physical therapy to address the deficits listed in the problem list box on initial evaluation, provide pt/family education and to maximize pt's level of independence in the home and community environment.     Pt's spiritual, cultural and educational needs considered and pt agreeable to plan of care and goals.    Anticipated barriers to physical therapy: None    Goals:   Short Term Goals: 2 weeks   Pt will use lumbar stabilization exercises independently as indicated  Pt will increase functional strength of LE's  SI pain will decrease to 1-2/10 with techniques  Long Term Goals: 6 weeks   Pt will use straight back bend technique in ADL  Pt will have  improved functional LE strength and endurance    Plan     Lumbar stabilization exercises as indicated  Monitor heel lift and SI belt  LE closed chain exercise   ADL drills with straight back bend  Ultrasound to SI PRN    Michael Méndez, PT

## 2019-06-03 ENCOUNTER — CLINICAL SUPPORT (OUTPATIENT)
Dept: REHABILITATION | Facility: HOSPITAL | Age: 64
End: 2019-06-03
Attending: ORTHOPAEDIC SURGERY
Payer: MEDICARE

## 2019-06-03 DIAGNOSIS — Z78.9 DECREASED ACTIVITIES OF DAILY LIVING (ADL): ICD-10-CM

## 2019-06-03 PROCEDURE — G8985 CARRY GOAL STATUS: HCPCS | Mod: CK,PO

## 2019-06-03 PROCEDURE — G8984 CARRY CURRENT STATUS: HCPCS | Mod: CL,PO

## 2019-06-03 PROCEDURE — 97110 THERAPEUTIC EXERCISES: CPT | Mod: PO

## 2019-06-03 PROCEDURE — 97530 THERAPEUTIC ACTIVITIES: CPT | Mod: PO

## 2019-06-03 NOTE — PROGRESS NOTES
"  Physical Therapy Daily Treatment Note     Name: Arthur Moreno  Clinic Number: [id number]    Therapy Diagnosis:   Encounter Diagnosis   Name Primary?    Decreased activities of daily living (ADL)      Physician: Elias Cerda MD    Visit Date: 6/3/2019  Physician Orders: PT Eval and Treat   Medical Diagnosis from Referral: M47.26 (ICD-10-CM) - Osteoarthritis of spine with radiculopathy, lumbar region  Evaluation Date: 5/24/2019  Authorization Period Expiration: 12/31/19  Plan of Care Expiration: 07/11/19  Visit # / Visits authorized: 5 / 20    Time In: 1102  Time Out: 1155  Total Billable Time: 53 minutes    Precautions:pacemaker and multiple back procedures, RIGHT TKA     Subjective     Pt reports: Increased pain after working around the house yesterday, but walked longer this morning than ever without increased pain     He was compliant with home exercise program.  Response to previous treatment: Doing exercises and SI techniques, still "Getting better"  Functional change: Walking longer and "moving around better"    Pain: 1-2 /10 walking in, 1/10 with exercise / position  Location: RIGHT > LEFT SI      Objective     Arthur received therapeutic exercises to develop strength, ROM and core stabilization for 28  minutes including:  PROM of LE's   Lumbar stabilization exercise with slight FLEXION bias:              UE patterns with LEFT arm 4# , RIGHT arm 2#, 3 x 10 each              LE foot lift and away   Opposite arm and leg  Leg press:   Bilateral @ 50,90, and 120#, 3 x 10 each and 80 degrees knee FLEXION    Single leg @ 50# and 80 degrees knee FLEXION 2 x 10 each leg  LE ABDUCTION (sitting machine) @30#, 3 x 10  Treadmill @1.3 mph with 1% grade x 2 minutes  DEFERRED TODAY:  Abdominal exercise with straight back bend on chest press machine, 30# x 5  UE pull down and pull back with7# 2 x 10 each, maintaining posture    Arthur received therapeutic activity to improve body mechanics for 25 minutes " "including:  Sit to stand with straight back bend  Lift crate with straight back bend 5-25# and varying heights (8 to 2 ")  Lift and step for placement of crate at different stations  Step with opposite foot pivot for turning    Arthur deferred the following manual therapy techniques: SI PUSH AWAY were applied to the: LEFT SI for 8 minutes    Arthur deferred the following direct contact modalities after being cleared for contraindications: Ultrasound:  Arthur received ultrasound to manage pain and inflammation at 100 % duty cycle applied to the RIGHT and LEFT SI areas at an intensity of 1.2W/cm2  for a duration of 10 minutes. Patient tolerated treatment well without adverse effects. Therapist was in attendance throughout intervention.    Home Exercises Provided and Patient Education Provided  Education provided:   - Importance of LE strength    Written Home Exercises Provided: Patient instructed to cont prior HEP.  Exercises were reviewed and Arthur was able to demonstrate them prior to the end of the session.  Arthur demonstrated good  understanding of the education provided.     See EMR under Media for exercises provided prior visit.    Assessment     Pt has decreased pain with exercise, heel lift  He is able to decrease pain from 2/10 to 1/10 with position and SI technique  Pt needs more work on body mechanics    Arthur is progressing well towards his goals.   Pt prognosis is Excellent.     Pt will continue to benefit from skilled outpatient physical therapy to address the deficits listed in the problem list box on initial evaluation, provide pt/family education and to maximize pt's level of independence in the home and community environment.     Pt's spiritual, cultural and educational needs considered and pt agreeable to plan of care and goals.    Anticipated barriers to physical therapy: None    Goals:   Short Term Goals: 2 weeks   Pt will use lumbar stabilization exercises independently as indicated  Pt will " increase functional strength of LE's  SI pain will decrease to 1-2/10 with techniques  Long Term Goals: 6 weeks   Pt will use straight back bend technique in ADL  Pt will have improved functional LE strength and endurance    Plan     Lumbar stabilization exercises as indicated  Monitor heel lift and SI belt  LE closed chain exercise   ADL drills with straight back bend  Ultrasound to SI XAVIER Méndez, PT

## 2019-06-05 ENCOUNTER — CLINICAL SUPPORT (OUTPATIENT)
Dept: REHABILITATION | Facility: HOSPITAL | Age: 64
End: 2019-06-05
Attending: ORTHOPAEDIC SURGERY
Payer: MEDICARE

## 2019-06-05 DIAGNOSIS — Z78.9 DECREASED ACTIVITIES OF DAILY LIVING (ADL): ICD-10-CM

## 2019-06-05 PROCEDURE — 97110 THERAPEUTIC EXERCISES: CPT | Mod: PO

## 2019-06-05 PROCEDURE — G8979 MOBILITY GOAL STATUS: HCPCS | Mod: CK,PO

## 2019-06-05 PROCEDURE — 97530 THERAPEUTIC ACTIVITIES: CPT | Mod: PO

## 2019-06-05 PROCEDURE — G8978 MOBILITY CURRENT STATUS: HCPCS | Mod: CK,PO

## 2019-06-05 NOTE — PROGRESS NOTES
"  Physical Therapy Daily Treatment Note     Name: Arthur Moreno  Clinic Number: [id number]    Therapy Diagnosis:   Encounter Diagnosis   Name Primary?    Decreased activities of daily living (ADL)      Physician: Elias Cerda MD    Visit Date: 6/5/2019  Physician Orders: PT Eval and Treat   Medical Diagnosis from Referral: M47.26 (ICD-10-CM) - Osteoarthritis of spine with radiculopathy, lumbar region  Evaluation Date: 5/24/2019  Authorization Period Expiration: 12/31/19  Plan of Care Expiration: 07/11/19  Visit # / Visits authorized: 6 / 20     Time In: 1003  Time Out: 1053  Total Billable Time45 minutes    Precautions:pacemaker and multiple back procedures, RIGHT TKA     Subjective     Pt reports: Increased pain after walking from car     He was compliant with home exercise program.  Response to previous treatment: Doing exercises and SI techniques, still "Getting better"  Functional change: Walking longer and "moving around better"    Pain: 1-2 /10 generally, 4/10 walking long distance from car, .5/10 with exercise / position and walking back to car  Location: RIGHT > LEFT SI      Objective     Arthur received therapeutic exercises to develop strength, ROM and core stabilization for 30  minutes including:  PROM of LE's   Lumbar stabilization exercise with slight FLEXION bias:              UE patterns with LEFT arm 4# , RIGHT arm 2#, 3 x 10 each              LE foot lift and away   Opposite arm and leg  Leg press:   Bilateral @ 50,90, and 120#, 3 x 10 each and 80 degrees knee FLEXION toe box of shoes   Single leg @ 50# and 80 degrees knee FLEXION 2 x 10 each leg  Treadmill @1.5 mph with heel lifts reversed @ toe box of shoes  x 5 minutes  Abdominal exercise with straight back bend on chest press machine, 30# 3 x 10  UE pull down and pull back with7# 2 x 10 each, maintaining posture    Arthur received therapeutic activity to improve body mechanics for 15 minutes including:  Standing and prolonged walking with 5 " "mm heel lift reversed in toe box of shoes to assist in FLEXION bias while standing  DEFERRED TODAY:  Sit to stand with straight back bend  Lift crate with straight back bend 5-25# and varying heights (8 to 2 ")  Lift and step for placement of crate at different stations  Step with opposite foot pivot for turning    Arthur deferred the following manual therapy techniques: SI PUSH AWAY were applied to the: LEFT SI for 8 minutes    Arthur deferred the following direct contact modalities after being cleared for contraindications: Ultrasound:  Arthur received ultrasound to manage pain and inflammation at 100 % duty cycle applied to the RIGHT and LEFT SI areas at an intensity of 1.2W/cm2  for a duration of 10 minutes. Patient tolerated treatment well without adverse effects. Therapist was in attendance throughout intervention.    Home Exercises Provided and Patient Education Provided  Education provided:   - Importance of LE strength    Written Home Exercises Provided: Patient instructed to cont prior HEP.  Exercises were reviewed and Arthur was able to demonstrate them prior to the end of the session.  Arthur demonstrated good  understanding of the education provided.     See EMR under Media for exercises provided prior visit.    Assessment     Pain "nearly gone" walking back to pt's truck with him and heel lifts as noted   He is able to decrease pain from 2/10 to 1/10 with position and SI technique  Pt needs more work on body mechanics    Arthur is progressing well towards his goals.   Pt prognosis is Excellent.     Pt will continue to benefit from skilled outpatient physical therapy to address the deficits listed in the problem list box on initial evaluation, provide pt/family education and to maximize pt's level of independence in the home and community environment.     Pt's spiritual, cultural and educational needs considered and pt agreeable to plan of care and goals.    Anticipated barriers to physical therapy: " None    Goals:   Short Term Goals: 2 weeks   Pt will use lumbar stabilization exercises independently as indicated  Pt will increase functional strength of LE's  SI pain will decrease to 1-2/10 with techniques  Long Term Goals: 5 weeks   Pt will use straight back bend technique in ADL  Pt will have improved functional LE strength and endurance    Plan     Lumbar stabilization exercises as indicated  Monitor heel lift and SI belt (SI belt deferred 5/31/19)  LE closed chain exercise    ADL drills with straight back bend  Ultrasound to SI (Deferred)    Michael Méndez, PT

## 2019-06-11 ENCOUNTER — CLINICAL SUPPORT (OUTPATIENT)
Dept: REHABILITATION | Facility: HOSPITAL | Age: 64
End: 2019-06-11
Attending: ORTHOPAEDIC SURGERY
Payer: MEDICARE

## 2019-06-11 DIAGNOSIS — Z78.9 DECREASED ACTIVITIES OF DAILY LIVING (ADL): ICD-10-CM

## 2019-06-11 PROCEDURE — 97110 THERAPEUTIC EXERCISES: CPT | Mod: PO

## 2019-06-11 NOTE — PROGRESS NOTES
"  Physical Therapy Daily Treatment Note     Name: Arthur Moreno  Clinic Number: [id number]    Therapy Diagnosis:   Encounter Diagnosis   Name Primary?    Decreased activities of daily living (ADL)      Physician: Elias Cerda MD    Visit Date: 6/11/2019  Physician Orders: PT Eval and Treat   Medical Diagnosis from Referral: M47.26 (ICD-10-CM) - Osteoarthritis of spine with radiculopathy, lumbar region  Evaluation Date: 5/24/2019  Authorization Period Expiration: 12/31/19  Plan of Care Expiration: 07/11/19  Visit # / Visits authorized: 7 / 20     Time In: 1058  Time Out: 1153  Total Billable Time 55 minutes    Precautions:pacemaker and multiple back procedures, RIGHT TKA     Subjective     Pt reports: Decreased pain after walking from car today and more tolerant of standing             Heel lifts backward at toe box of both shoes helped maintain FLEXION bias    He was compliant with home exercise program.  Response to previous treatment: Doing exercises   Functional change: Walking longer and "standing around better"    Pain: 0 /10 generally, 0-1/10 walking long distance  Location: RIGHT > LEFT SI      Objective     Arthur received therapeutic exercises to develop strength, ROM and core stabilization for55 minutes including:  PROM of LE's   Lumbar stabilization exercise with slight FLEXION bias:              UE patterns with LEFT arm 4# , RIGHT arm 2#, 3 x 10 each              LE foot lift and away   Opposite arm and leg  Leg press:   Bilateral @ 80#, 3 x 10  and 80 degrees knee FLEXION   Single leg @ 40# and 80 degrees knee FLEXION 2 x 10 each leg  Treadmill @1.5 mph   x 5 minutes  Abdominal exercise with straight back bend on chest press machine, 30# 3 x 10  UE pull down and pull back with 10# 2 x 10 each, maintaining posture    Arthur deferred therapeutic activity to improve body mechanics for 15 minutes including:  Sit to stand with straight back bend  Lift crate with straight back bend 5-25# and varying " "heights (8 to 2 ")  Lift and step for placement of crate at different stations  Step with opposite foot pivot for turning    Arthur deferred the following manual therapy techniques: SI PUSH AWAY were applied to the: LEFT SI for 8 minutes    Home Exercises Provided and Patient Education Provided  Education provided:   Showed pt. Web site for [employer] vs ZERO Drop shoes instead of "backward" heel lifts    Written Home Exercises Provided: Patient instructed to cont prior HEP.  Exercises were reviewed and Arthur was able to demonstrate them prior to the end of the session.  Arthur demonstrated good  understanding of the education provided.     See EMR under Media for exercises provided prior visit.    Assessment     Pain "nearly gone" walking longer distances, prolonged standing "hurts the most"  He is able to decrease pain from 2/10 to 1/10 with position     Arthur is progressing well towards his goals.   Pt prognosis is Excellent.     Pt will continue to benefit from skilled outpatient physical therapy to address the deficits listed in the problem list box on initial evaluation, provide pt/family education and to maximize pt's level of independence in the home and community environment.     Pt's spiritual, cultural and educational needs considered and pt agreeable to plan of care and goals.    Anticipated barriers to physical therapy: None    Goals:   Short Term Goals: 2 weeks   Pt will use lumbar stabilization exercises independently as indicated (MET)  Pt will increase functional strength of LE's (Progress)  SI pain will decrease to 1-2/10 with techniques (MET)  Long Term Goals: 4 weeks   Pt will use straight back bend technique in ADL  Pt will have improved functional LE strength and endurance    Plan     Lumbar stabilization exercises as indicated  Monitor heel lift and SI belt (SI belt deferred 5/31/19)  LE closed chain exercise    ADL drills with straight back bend  Ultrasound to SI (Deferred)    Michael " Dev, CIRILO

## 2019-06-13 ENCOUNTER — CLINICAL SUPPORT (OUTPATIENT)
Dept: REHABILITATION | Facility: HOSPITAL | Age: 64
End: 2019-06-13
Attending: ORTHOPAEDIC SURGERY
Payer: MEDICARE

## 2019-06-13 DIAGNOSIS — Z78.9 DECREASED ACTIVITIES OF DAILY LIVING (ADL): ICD-10-CM

## 2019-06-13 PROCEDURE — 97110 THERAPEUTIC EXERCISES: CPT | Mod: PO

## 2019-06-13 NOTE — PROGRESS NOTES
"  Physical Therapy Daily Treatment Note     Name: Arthur Moreno  Clinic Number: [id number]    Therapy Diagnosis:   Encounter Diagnosis   Name Primary?    Decreased activities of daily living (ADL)      Physician: Elias Cerda MD    Visit Date: 6/13/2019  Physician Orders: PT Eval and Treat   Medical Diagnosis from Referral: M47.26 (ICD-10-CM) - Osteoarthritis of spine with radiculopathy, lumbar region  Evaluation Date: 5/24/2019  Authorization Period Expiration: 12/31/19  Plan of Care Expiration: 07/11/19  Visit # / Visits authorized: 8 / 20     Time In: 1058  Time Out: 1145  Total Billable Time 45 minutes    Precautions:pacemaker and multiple back procedures, RIGHT TKA     Subjective     Pt reports: Decreased pain with increased activity overall    He was compliant with home exercise program.  Response to previous treatment: Doing exercises   Functional change: Walking longer and standing with less pain for longer duration    Pain: 0-.5 /10 generally, even walking  Location: RIGHT > LEFT SI      Objective     Arthur received therapeutic exercises to develop strength, ROM and core stabilization for45 minutes including:  Leg press:   Bilateral @ 80#, 3 x 10  and 80 degrees knee FLEXION   Single leg @ 40# and 80 degrees knee FLEXION 2 x 10 each leg  Abdominal exercise with straight back bend on chest press machine, 30# 3 x 10  UE pull down and pull back with 10# 2 x 10 each, maintaining posture  Trunk ROTATION on machine @ 10# 2 x 10 each direction  UBE x 4 minutes (45 second work / 15 second rest x 2 each direction)    Arthur deferred therapeutic activity to improve body mechanics for 15 minutes including:  Sit to stand with straight back bend  Lift crate with straight back bend 5-25# and varying heights (8 to 2 ")  Lift and step for placement of crate at different stations  Step with opposite foot pivot for turning    Arthur deferred the following manual therapy techniques: SI PUSH AWAY were applied to the: LEFT " "SI for 8 minutes    Home Exercises Provided and Patient Education Provided  Education provided:   Showed pt. Web site for [employer] vs ZERO Drop shoes instead of "backward" heel lifts    Written Home Exercises Provided: Patient instructed to cont prior HEP.  Exercises were reviewed and Arthur was able to demonstrate them prior to the end of the session.  Arthur demonstrated good  understanding of the education provided.     See EMR under Media for exercises provided prior visit.    Assessment     Pt says he is ready to join health club to continue exercises    Arthur is progressing well towards his goals.   Pt prognosis is Excellent.     Pt will continue to benefit from skilled outpatient physical therapy to address the deficits listed in the problem list box on initial evaluation, provide pt/family education and to maximize pt's level of independence in the home and community environment.     Pt's spiritual, cultural and educational needs considered and pt agreeable to plan of care and goals.    Anticipated barriers to physical therapy: None    Goals:   Short Term Goals: 2 weeks   Pt will use lumbar stabilization exercises independently as indicated (MET)  Pt will increase functional strength of LE's (Progress)  SI pain will decrease to 1-2/10 with techniques (MET)  Long Term Goals: 3 weeks   Pt will use straight back bend technique in ADL(Progress)  Pt will have improved functional LE strength and endurance    Plan     Lumbar stabilization exercises as indicated  Monitor heel lift and SI belt (SI belt deferred 5/31/19)  LE closed chain exercise    ADL drills with straight back bend  Ultrasound to SI (Deferred)    Michael Méndez, PT  "

## 2019-06-18 ENCOUNTER — CLINICAL SUPPORT (OUTPATIENT)
Dept: REHABILITATION | Facility: HOSPITAL | Age: 64
End: 2019-06-18
Attending: ORTHOPAEDIC SURGERY
Payer: MEDICARE

## 2019-06-18 DIAGNOSIS — Z78.9 DECREASED ACTIVITIES OF DAILY LIVING (ADL): ICD-10-CM

## 2019-06-18 PROCEDURE — 97110 THERAPEUTIC EXERCISES: CPT | Mod: PO

## 2019-06-18 PROCEDURE — 97530 THERAPEUTIC ACTIVITIES: CPT | Mod: PO

## 2019-06-18 NOTE — PROGRESS NOTES
"  Physical Therapy Daily Treatment Note     Name: Arthur Moreno  Clinic Number: [id number]    Therapy Diagnosis:   Encounter Diagnosis   Name Primary?    Decreased activities of daily living (ADL)      Physician: Elias Cerda MD    Visit Date: 6/18/2019  Physician Orders: PT Eval and Treat   Medical Diagnosis from Referral: M47.26 (ICD-10-CM) - Osteoarthritis of spine with radiculopathy, lumbar region  Evaluation Date: 5/24/2019  Authorization Period Expiration: 12/31/19  Plan of Care Expiration: 07/11/19  Visit # / Visits authorized: 9 / 20     Time In: 1105  Time Out: 1130  Total Billable Time 25 minutes    Precautions:pacemaker and multiple back procedures, RIGHT TKA     Subjective     Pt reports: Decreased pain with increased activity overall. Only discomfort is with sitting and rising from chair.    He was compliant with home exercise program.  Response to previous treatment: Doing exercises and "thinking about posture"  Functional change: Walking longer and standing with less pain for longer duration    Pain: 0-.5 /10 generally, even walking  Location: RIGHT > LEFT lumbar     Objective     Arthur received therapeutic exercises to develop strength, ROM and core stabilization for15 minutes including:  Lumbar stabilization exercise with slight EXTENSION bias:              UE patterns with LEFT arm 4# , RIGHT arm 2#, 3 x 10 each              LE foot lift and away   Opposite arm and leg  DEFERRED TODAY  Leg press:   Bilateral @ 80#, 3 x 10  and 80 degrees knee FLEXION   Single leg @ 40# and 80 degrees knee FLEXION 2 x 10 each leg  Abdominal exercise with straight back bend on chest press machine, 30# 3 x 10  UE pull down and pull back with 10# 2 x 10 each, maintaining posture  Trunk ROTATION on machine @ 10# 2 x 10 each direction  UBE x 4 minutes (45 second work / 15 second rest x 2 each direction)    Arthur received therapeutic activity to improve body mechanics for 10 minutes including:  Sit to stand with " "straight back bend  Lift with straight back bend and tape in slight EXTENSION assist    Arthur deferred the following manual therapy techniques: SI PUSH AWAY were applied to the: LEFT SI for 8 minutes    Home Exercises Provided and Patient Education Provided  Education provided:   Showed pt. Web site for Earth [employer] vs ZERO Drop shoes instead of "backward" heel lifts    Written Home Exercises Provided: Patient instructed to cont prior HEP.  Exercises were reviewed and Arthur was able to demonstrate them prior to the end of the session.  Arthur demonstrated good  understanding of the education provided.     See EMR under Media for exercises provided prior visit.    Assessment     Pt says he is ready to join health club to continue exercises  Slight EXTENSION bias "feels good" so he will remove heel lifts from toe box of shoes and keep tape on for today.    Arthur is progressing well towards his goals.   Pt prognosis is Excellent.     Pt will continue to benefit from skilled outpatient physical therapy to address the deficits listed in the problem list box on initial evaluation, provide pt/family education and to maximize pt's level of independence in the home and community environment.     Pt's spiritual, cultural and educational needs considered and pt agreeable to plan of care and goals.    Anticipated barriers to physical therapy: None    Goals:   Short Term Goals: 2 weeks   Pt will use lumbar stabilization exercises independently as indicated (MET)  Pt will increase functional strength of LE's (Progress)  SI pain will decrease to 1-2/10 with techniques (MET)  Long Term Goals: 2 weeks   Pt will use straight back bend technique in ADL(Progress)  Pt will have improved functional LE strength and endurance    Plan     Lumbar stabilization exercises as indicated  Monitor heel lift and SI belt (SI belt deferred 5/31/19)  LE closed chain exercise    ADL drills with straight back bend  Ultrasound to SI (Deferred)    Michael" Dev, CIRILO

## 2019-06-20 PROBLEM — R22.32 ARM MASS, LEFT: Status: ACTIVE | Noted: 2019-06-20

## 2019-06-27 ENCOUNTER — CLINICAL SUPPORT (OUTPATIENT)
Dept: REHABILITATION | Facility: HOSPITAL | Age: 64
End: 2019-06-27
Payer: MEDICARE

## 2019-06-27 DIAGNOSIS — Z78.9 DECREASED ACTIVITIES OF DAILY LIVING (ADL): ICD-10-CM

## 2019-06-27 PROCEDURE — 97110 THERAPEUTIC EXERCISES: CPT | Mod: PO

## 2019-06-27 PROCEDURE — 97035 APP MDLTY 1+ULTRASOUND EA 15: CPT | Mod: PO

## 2019-06-28 NOTE — PROGRESS NOTES
Outpatient Therapy Discharge Summary     Name: Arthur Moreno  [facility] Number: [id number]    Therapy Diagnosis:   Encounter Diagnosis   Name Primary?    Decreased activities of daily living (ADL)      Physician: Elias Cerda MD    Physician Orders:  PT Eval and Treat   Medical Diagnosis from Referral: M47.26 (ICD-10-CM) - Osteoarthritis of spine with radiculopathy, lumbar region  Evaluation Date: 5/24/2019    Date of Last visit: 06/27/19  Total Visits Received: 10    Assessment    Goals:   Short Term Goals:   Pt will use lumbar stabilization exercises independently as indicated  (MET)  Pt will increase functional strength of LE's (MET)  SI pain will decrease to 1-2/10 with techniques (MET)  Long Term Goals:   Pt will use straight back bend technique in ADL (MET)  Pt will have improved functional LE strength and endurance  (MET)      Discharge reason: Patient is now asymptomatic and Patient has met all of his/her goals    Plan   This patient is discharged from Physical Therapy    Physical Therapy Daily Treatment Note     Name: Arthur Moreno  Clinic Number: [id number]    Therapy Diagnosis:   Encounter Diagnosis   Name Primary?    Decreased activities of daily living (ADL)      Physician: Elias Cerda MD    Visit Date: 6/27/2019  Physician Orders: PT Eval and Treat   Medical Diagnosis from Referral: M47.26 (ICD-10-CM) - Osteoarthritis of spine with radiculopathy, lumbar region  Evaluation Date: 5/24/2019  Authorization Period Expiration: 12/31/19  Plan of Care Expiration: 07/11/19  Visit # / Visits authorized: 10 / 20     Time In: 1100  Time Out: 1155  Total Billable Time:  50 minutes    Precautions:pacemaker and multiple back procedures, RIGHT TKA     Subjective     Pt reports: Decreased pain with increased activity overall. Only discomfort is with prolonged standing.   He returned to FLEXION bias and use of heel lifts in toe box of shoes    He was compliant with home exercise program.  Response to  "previous treatment: Doing exercises and "thinking about posture"  Functional change: Walking longer and standing with less pain for longer duration    Pain: 0-.5 /10 generally, even walking  Location: RIGHT > LEFT lumbar     Objective     Arthur received therapeutic exercises to develop strength, ROM and core stabilization for15 minutes including:  Lumbar stabilization exercise with FLEXION bias:              UE patterns with LEFT arm 4# , RIGHT arm 2#, 3 x 10 each              LE foot lift and away   Opposite arm and leg   Bridging and knees apart / together  Leg press:   Bilateral @ 80 and 100#, 3 x 10  and 80 degrees knee FLEXION, 120# at 90 degrees x 10   Single leg @ 40# and 80 degrees knee FLEXION 2 x 10 each leg  DEFERRED TODAY  Abdominal exercise with straight back bend on chest press machine, 30# 3 x 10  UE pull down and pull back with 10# 2 x 10 each, maintaining posture  Trunk ROTATION on machine @ 10# 2 x 10 each direction  UBE x 4 minutes (45 second work / 15 second rest x 2 each direction)     Pt received ultrasound at 100% and 1.2 W/cm2 for 10 minutes to the LEFT SI area    Arthur deferred therapeutic activity to improve body mechanics for 10 minutes including:  Sit to stand with straight back bend  Lift with straight back bend and tape in slight EXTENSION assist    Arthur deferred the following manual therapy techniques: SI PUSH AWAY were applied to the: LEFT SI for 8 minutes    Home Exercises Provided and Patient Education Provided  Education provided:   Showed pt. Web site for [employer] vs ZERO Drop shoes instead of "backward" heel lifts    Written Home Exercises Provided: Patient instructed to cont prior HEP.  Exercises were reviewed and Arthur was able to demonstrate them prior to the end of the session.  Arthur demonstrated good  understanding of the education provided.     See EMR under Media for exercises provided prior visit.    Assessment     Pt says he is ready to join health club to continue " "exercises  Slight FLEXION bias "feels good" again. Propping foot on 2" board helps decrease pain in standing  Arthur is progressing well towards his goals.   Pt prognosis is Excellent.     Pt will continue to benefit from skilled outpatient physical therapy to address the deficits listed in the problem list box on initial evaluation, provide pt/family education and to maximize pt's level of independence in the home and community environment.     Pt's spiritual, cultural and educational needs considered and pt agreeable to plan of care and goals.    Anticipated barriers to physical therapy: None    Goals:   Short Term Goals: 2 weeks   Pt will use lumbar stabilization exercises independently as indicated (MET)  Pt will increase functional strength of LE's (Progress)  SI pain will decrease to 1-2/10 with techniques (MET)  Long Term Goals: 2 weeks   Pt will use straight back bend technique in ADL(Progress)  Pt will have improved functional LE strength and endurance    Plan     Lumbar stabilization exercises as indicated  Monitor heel lift and SI belt (SI belt deferred 5/31/19)  LE closed chain exercise    ADL drills with straight back bend  Ultrasound to SI (Deferred)    Michael Méndez, PT  "

## 2019-07-08 PROBLEM — R22.32 ARM MASS, LEFT: Status: RESOLVED | Noted: 2019-06-20 | Resolved: 2019-07-08

## 2019-07-31 PROBLEM — M26.609 TMJ (TEMPOROMANDIBULAR JOINT SYNDROME): Status: ACTIVE | Noted: 2019-07-31

## 2019-12-01 PROBLEM — M47.22 CERVICAL SPONDYLOSIS WITH RADICULOPATHY: Status: ACTIVE | Noted: 2019-12-01

## 2019-12-05 DIAGNOSIS — M47.22 CERVICAL SPONDYLOSIS WITH RADICULOPATHY: Primary | ICD-10-CM

## 2019-12-10 ENCOUNTER — OFFICE VISIT (OUTPATIENT)
Dept: NEUROSURGERY | Facility: CLINIC | Age: 64
End: 2019-12-10
Payer: MEDICARE

## 2019-12-10 ENCOUNTER — HOSPITAL ENCOUNTER (OUTPATIENT)
Dept: RADIOLOGY | Facility: HOSPITAL | Age: 64
Discharge: HOME | End: 2019-12-10
Attending: NEUROLOGICAL SURGERY
Payer: MEDICARE

## 2019-12-10 ENCOUNTER — TELEPHONE (OUTPATIENT)
Dept: NEUROSURGERY | Facility: CLINIC | Age: 64
End: 2019-12-10

## 2019-12-10 VITALS
HEIGHT: 70 IN | BODY MASS INDEX: 37.78 KG/M2 | DIASTOLIC BLOOD PRESSURE: 66 MMHG | SYSTOLIC BLOOD PRESSURE: 141 MMHG | HEART RATE: 59 BPM | WEIGHT: 263.88 LBS

## 2019-12-10 DIAGNOSIS — M47.22 CERVICAL SPONDYLOSIS WITH RADICULOPATHY: ICD-10-CM

## 2019-12-10 DIAGNOSIS — M50.20 HERNIATED CERVICAL DISC WITHOUT MYELOPATHY: Primary | ICD-10-CM

## 2019-12-10 PROCEDURE — 3078F PR MOST RECENT DIASTOLIC BLOOD PRESSURE < 80 MM HG: ICD-10-PCS | Mod: CPTII,S$GLB,, | Performed by: NEUROLOGICAL SURGERY

## 2019-12-10 PROCEDURE — 99214 OFFICE O/P EST MOD 30 MIN: CPT | Mod: S$GLB,,, | Performed by: NEUROLOGICAL SURGERY

## 2019-12-10 PROCEDURE — 1101F PT FALLS ASSESS-DOCD LE1/YR: CPT | Mod: CPTII,S$GLB,, | Performed by: NEUROLOGICAL SURGERY

## 2019-12-10 PROCEDURE — 99999 PR PBB SHADOW E&M-EST. PATIENT-LVL III: CPT | Mod: PBBFAC,,, | Performed by: NEUROLOGICAL SURGERY

## 2019-12-10 PROCEDURE — 3078F DIAST BP <80 MM HG: CPT | Mod: CPTII,S$GLB,, | Performed by: NEUROLOGICAL SURGERY

## 2019-12-10 PROCEDURE — 1101F PR PT FALLS ASSESS DOC 0-1 FALLS W/OUT INJ PAST YR: ICD-10-PCS | Mod: CPTII,S$GLB,, | Performed by: NEUROLOGICAL SURGERY

## 2019-12-10 PROCEDURE — 3008F BODY MASS INDEX DOCD: CPT | Mod: CPTII,S$GLB,, | Performed by: NEUROLOGICAL SURGERY

## 2019-12-10 PROCEDURE — 72050 XR CERVICAL SPINE AP LAT WITH FLEX EXTEN: ICD-10-PCS | Mod: 26,,, | Performed by: RADIOLOGY

## 2019-12-10 PROCEDURE — 3008F PR BODY MASS INDEX (BMI) DOCUMENTED: ICD-10-PCS | Mod: CPTII,S$GLB,, | Performed by: NEUROLOGICAL SURGERY

## 2019-12-10 PROCEDURE — 99214 PR OFFICE/OUTPT VISIT, EST, LEVL IV, 30-39 MIN: ICD-10-PCS | Mod: S$GLB,,, | Performed by: NEUROLOGICAL SURGERY

## 2019-12-10 PROCEDURE — 3077F PR MOST RECENT SYSTOLIC BLOOD PRESSURE >= 140 MM HG: ICD-10-PCS | Mod: CPTII,S$GLB,, | Performed by: NEUROLOGICAL SURGERY

## 2019-12-10 PROCEDURE — 99999 PR PBB SHADOW E&M-EST. PATIENT-LVL III: ICD-10-PCS | Mod: PBBFAC,,, | Performed by: NEUROLOGICAL SURGERY

## 2019-12-10 PROCEDURE — 72050 X-RAY EXAM NECK SPINE 4/5VWS: CPT | Mod: 26,,, | Performed by: RADIOLOGY

## 2019-12-10 PROCEDURE — 72050 X-RAY EXAM NECK SPINE 4/5VWS: CPT | Mod: TC,FY,PO

## 2019-12-10 PROCEDURE — 3077F SYST BP >= 140 MM HG: CPT | Mod: CPTII,S$GLB,, | Performed by: NEUROLOGICAL SURGERY

## 2019-12-10 NOTE — TELEPHONE ENCOUNTER
Hi,     This gentleman was seen today by Dr. Crawford. We would like to get him scheduled for a C5-C6 facet injection. Can you please assist with getting him scheduled?       Thank you so much!

## 2019-12-10 NOTE — LETTER
December 13, 2019      NEVAEH Delaney Jr., MD  [address] [facility]  [address]  [facility] [address]           [place] [facility]  [address] [place] [address]  Phone: [phone]  Fax: [phone]          Patient: Arthur Moreno   MR Number: [id number]   YOB: 1955   Date of Visit: 12/10/2019       Dear Dr. NEVAEH Delaney Jr.:    Thank you for referring Arthur Moreno to me for evaluation. Attached you will find relevant portions of my assessment and plan of care.    If you have questions, please do not hesitate to call me. I look forward to following Arthur Moreno along with you.    Sincerely,    Elver Crawford MD    Enclosure  CC:  No Recipients    If you would like to receive this communication electronically, please contact [email or web address] or [phone] to request more information on [employer] access.    For providers and/or their staff who would like to refer a patient to [facility], please contact us through our one-stop-shop provider referral line, [facility], at [phone].    If you feel you have received this communication in error or would no longer like to receive these types of communications, please e-mail [email or web address]

## 2019-12-11 NOTE — TELEPHONE ENCOUNTER
Hello,  If you're asking for a C5/6 facet joint injection, this is only one level. If would be different if you were asking for a C5/6 and C6/7 facet injection. If it's just C5/6, this is completely fine to do bilaterally.Just let me know. Thanks  [facility]

## 2019-12-13 NOTE — PROGRESS NOTES
Neurosurgery History & Physical    Patient ID: Arthur Moreno is a 64 y.o. male.    Chief Complaint   Patient presents with    Cervical Spine Pain (C-spine)     8 month history of worsening neck pain. Denies pain, numbness, or weakness to upper extremities. Denies bladder or bowel dysfunction. Pain is increased with driving/turning his head. Denies alleviating factors. Patient has been doing PT with minimal improvement. Denies hx RIGO       HPI:  64 year old male with approximately 8 month history of right sided neck pain.  His pain is worse with more activity.  He denies any issues with pain shooting down either arm.  He denies dropping things.  Denies issues with his gait.  Denies bowel/bladder incontinence.  He has not had any recent injections.  He has tried PT without significant improvement.    Review of Systems   Constitutional: Negative for activity change, chills and fever.   HENT: Negative for congestion.    Respiratory: Negative for cough and chest tightness.    Cardiovascular: Negative for chest pain.   Gastrointestinal: Negative for diarrhea, nausea and vomiting.   Musculoskeletal: Positive for neck pain.   Neurological: Negative for speech difficulty.       Past Medical History:   Diagnosis Date    Amaurosis fugax of right eye 4/2014    resolved    Anticoagulant long-term use     Arm mass, left 6/20/2019    Bilateral venous insufficiency     legs    Calculus of gallbladder without mention of cholecystitis or obstruction     Cardiac arrhythmia     has implanted loop recorder, palpitations. Hx dizziness on bending over    Cellulitis and abscess of leg April 2014    treated at wound care center, resolving    Coronary artery disease     denies chest pain, denies MI, no stents; sees Dr Grant    Estrogen excess 08/2017    Generalized osteoarthrosis, involving multiple sites     Hyperlipidemia     Hypertension     Hypertension 10/7/2015    Hypothyroidism     Knee pain     right far worse  than left    Obesity, unspecified     Occlusion and stenosis of carotid artery without mention of cerebral infarction     Primary osteoarthritis of right knee 08/2017    Sleep apnea with use of continuous positive airway pressure (CPAP)     Status post placement of implantable loop recorder     Thyroid disease     Transient arterial occlusion of retina     Transient visual loss     BOTH EYE  PER DR CAMPOVERDE  7/27/2015     Social History     Socioeconomic History    Marital status:      Spouse name: Not on file    Number of children: Not on file    Years of education: Not on file    Highest education level: Not on file   Occupational History    Not on file   Social Needs    Financial resource strain: Not hard at all    Food insecurity:     Worry: Never true     Inability: Never true    Transportation needs:     Medical: No     Non-medical: No   Tobacco Use    Smoking status: Never Smoker    Smokeless tobacco: Never Used   Substance and Sexual Activity    Alcohol use: Yes     Frequency: 4 or more times a week     Drinks per session: 3 or 4     Binge frequency: Less than monthly     Comment: daily  16 oz daily     Drug use: Never    Sexual activity: Yes     Partners: Female     Birth control/protection: None   Lifestyle    Physical activity:     Days per week: 0 days     Minutes per session: 0 min    Stress: Only a little   Relationships    Social connections:     Talks on phone: More than three times a week     Gets together: Twice a week     Attends Anabaptism service: Not on file     Active member of club or organization: Yes     Attends meetings of clubs or organizations: More than 4 times per year     Relationship status:    Other Topics Concern    Not on file   Social History Narrative    Not on file     Family History   Problem Relation Age of Onset    Diabetes Mother     Heart disease Mother     Alzheimer's disease Mother     Diabetes Father     Heart disease Father      Arthritis Sister     Arthritis Brother     Arthritis Brother     Arthritis Brother      Review of patient's allergies indicates:   Allergen Reactions    Lamisil [terbinafine] Rash     Emollient form only , also had Bactrim in it, not sure       Current Outpatient Medications:     allopurinol (ZYLOPRIM) 300 MG tablet, Take 1 tablet (300 mg total) by mouth once daily. Start on 11/30/19, Disp: 30 tablet, Rfl: 1    ALPRAZolam (XANAX) 0.25 MG tablet, Take 1 tablet (0.25 mg total) by mouth 3 (three) times daily as needed for Anxiety., Disp: 90 tablet, Rfl: 5    aspirin (ECOTRIN) 81 MG EC tablet, Take 81 mg by mouth once daily. Patient held since 6/12/16 per md instructions, Disp: , Rfl:     atenolol (TENORMIN) 50 MG tablet, Take 1 tablet (50 mg total) by mouth once daily., Disp: 90 tablet, Rfl: 3    atorvastatin (LIPITOR) 40 MG tablet, TAKE 1 TABLET AT BEDTIME, Disp: 90 tablet, Rfl: 2    b complex vitamins capsule, Take 1 capsule by mouth once daily., Disp: , Rfl:     co-enzyme Q-10 50 mg capsule, Take 50 mg by mouth once daily., Disp: , Rfl:     colchicine (COLCRYS) 0.6 mg tablet, Take 1 now and in 2 hrs, then 1 daily ; on day 5 start allopurinol., Disp: 15 tablet, Rfl: 0    fosinopril (MONOPRIL) 20 MG tablet, Take 1 tablet (20 mg total) by mouth once daily., Disp: 90 tablet, Rfl: 3    Lactobacillus rhamnosus GG (CULTURELLE) 10 billion cell capsule, Take 1 capsule by mouth once daily., Disp: , Rfl:     levothyroxine (SYNTHROID) 88 MCG tablet, Take 1 tablet (88 mcg total) by mouth before breakfast., Disp: 90 tablet, Rfl: 3    multivitamin (ONE DAILY MULTIVITAMIN) per tablet, Take 1 tablet by mouth once daily., Disp: , Rfl:     ondansetron (ZOFRAN-ODT) 8 MG TbDL, Take 1 tablet (8 mg total) by mouth every 6 (six) hours as needed., Disp: 10 tablet, Rfl: 0    potassium chloride SA (K-DUR,KLOR-CON) 10 MEQ tablet, Take 2 tablets (20 mEq total) by mouth once daily., Disp: 60 tablet, Rfl: 2    torsemide  "(DEMADEX) 20 MG Tab, Take 1 tablet (20 mg total) by mouth once daily., Disp: 30 tablet, Rfl: 11  Blood pressure (!) 141/66, pulse (!) 59, height 5' 10" (1.778 m), weight 119.7 kg (263 lb 14.3 oz).      Neurologic Exam     Mental Status   Oriented to person, place, and time.   Attention: normal.   Speech: speech is normal   Level of consciousness: alert  Knowledge: good.     Motor Exam   Muscle bulk: normal  Overall muscle tone: normal    Strength   Strength 5/5 throughout.     Sensory Exam   Light touch normal.     Gait, Coordination, and Reflexes     Gait  Gait: normal    Reflexes   Right Pittman: absent  Left Pittman: absent  Right ankle clonus: absent  Left ankle clonus: absent      Physical Exam   Constitutional: He is oriented to person, place, and time.   Neurological: He is oriented to person, place, and time. He has normal strength. Gait normal.   Psychiatric: His speech is normal.        Imaging:   MRI Cervical spine dated 11/29/2019 is reviewed personally and discussed with the patient.  There is a broad based disc bulge at C5-6 with bilateral neural foraminal narrowing and central canal stenosis.   I see no cord signal change.  There is C5-6 facet arthropathy right > left.    Assessment/Plan:   I discussed the imaging findings at length with Mr. Moreno.  Given that he has no radicular or myelopathic symptoms, I do not feel strongly that surgical intervention will help him at this point.  I did discuss that fusion can have a poasitive effect on neck pain if there is a significant component of pain from facet arthropathy.  I think it would be beneficial for him to undergo a C5-6 facet block on the right-hand side. If this improves his pain significantly, it could lend more rationale to considering a C5-6 ACDF.    We will follow-up with the patient after he receives injections by Dr. Amado.    "

## 2020-01-09 ENCOUNTER — OFFICE VISIT (OUTPATIENT)
Dept: PAIN MEDICINE | Facility: CLINIC | Age: 65
End: 2020-01-09
Payer: MEDICARE

## 2020-01-09 VITALS
WEIGHT: 262.69 LBS | DIASTOLIC BLOOD PRESSURE: 54 MMHG | OXYGEN SATURATION: 98 % | TEMPERATURE: 97 F | RESPIRATION RATE: 18 BRPM | BODY MASS INDEX: 36.77 KG/M2 | SYSTOLIC BLOOD PRESSURE: 102 MMHG | HEIGHT: 71 IN | HEART RATE: 64 BPM

## 2020-01-09 DIAGNOSIS — M47.812 CERVICAL SPONDYLOSIS: Primary | ICD-10-CM

## 2020-01-09 DIAGNOSIS — M48.02 CERVICAL STENOSIS OF SPINAL CANAL: ICD-10-CM

## 2020-01-09 DIAGNOSIS — M50.30 DDD (DEGENERATIVE DISC DISEASE), CERVICAL: ICD-10-CM

## 2020-01-09 PROCEDURE — 3078F PR MOST RECENT DIASTOLIC BLOOD PRESSURE < 80 MM HG: ICD-10-PCS | Mod: CPTII,S$GLB,, | Performed by: ANESTHESIOLOGY

## 2020-01-09 PROCEDURE — 99204 OFFICE O/P NEW MOD 45 MIN: CPT | Mod: S$GLB,,, | Performed by: ANESTHESIOLOGY

## 2020-01-09 PROCEDURE — 3074F PR MOST RECENT SYSTOLIC BLOOD PRESSURE < 130 MM HG: ICD-10-PCS | Mod: CPTII,S$GLB,, | Performed by: ANESTHESIOLOGY

## 2020-01-09 PROCEDURE — 3078F DIAST BP <80 MM HG: CPT | Mod: CPTII,S$GLB,, | Performed by: ANESTHESIOLOGY

## 2020-01-09 PROCEDURE — 3008F PR BODY MASS INDEX (BMI) DOCUMENTED: ICD-10-PCS | Mod: CPTII,S$GLB,, | Performed by: ANESTHESIOLOGY

## 2020-01-09 PROCEDURE — 99999 PR PBB SHADOW E&M-EST. PATIENT-LVL V: ICD-10-PCS | Mod: PBBFAC,,, | Performed by: ANESTHESIOLOGY

## 2020-01-09 PROCEDURE — 99204 PR OFFICE/OUTPT VISIT, NEW, LEVL IV, 45-59 MIN: ICD-10-PCS | Mod: S$GLB,,, | Performed by: ANESTHESIOLOGY

## 2020-01-09 PROCEDURE — 3074F SYST BP LT 130 MM HG: CPT | Mod: CPTII,S$GLB,, | Performed by: ANESTHESIOLOGY

## 2020-01-09 PROCEDURE — 1101F PR PT FALLS ASSESS DOC 0-1 FALLS W/OUT INJ PAST YR: ICD-10-PCS | Mod: CPTII,S$GLB,, | Performed by: ANESTHESIOLOGY

## 2020-01-09 PROCEDURE — 99999 PR PBB SHADOW E&M-EST. PATIENT-LVL V: CPT | Mod: PBBFAC,,, | Performed by: ANESTHESIOLOGY

## 2020-01-09 PROCEDURE — 1101F PT FALLS ASSESS-DOCD LE1/YR: CPT | Mod: CPTII,S$GLB,, | Performed by: ANESTHESIOLOGY

## 2020-01-09 PROCEDURE — 3008F BODY MASS INDEX DOCD: CPT | Mod: CPTII,S$GLB,, | Performed by: ANESTHESIOLOGY

## 2020-01-09 RX ORDER — SODIUM CHLORIDE, SODIUM LACTATE, POTASSIUM CHLORIDE, CALCIUM CHLORIDE 600; 310; 30; 20 MG/100ML; MG/100ML; MG/100ML; MG/100ML
INJECTION, SOLUTION INTRAVENOUS CONTINUOUS
Status: CANCELLED | OUTPATIENT
Start: 2020-01-17

## 2020-01-09 NOTE — H&P (VIEW-ONLY)
This note was completed with dictation software and grammatical errors may exist.    CC:Neck pain    HPI:  The patient is a 64-year-old man with a history of hypertension, CAD, loop recorder, venous insufficiency who presents in referral from Dr. Crawford for neck pain. The patient is a 64-year-old man with a history of CAD on aspirin, obesity, lumbar stenosis status post surgery, gout who presents in referral from Dr. Crawford for right-sided neck pain. Patient denies any specific trauma, reports having no longstanding history of neck pain.  However about 18 months ago he began having discomfort in the right side of his neck.  It is worse with turning his head side to side especially with right lateral rotation.  It stays in the right neck and radiates up to the occiput but denies any radiation into the parietal region, no headaches with involved with this.  He denies any major radiation into the arms, no numbness or tingling, no weakness in his arms.  He denies any balance issues or dexterity issues.  He denies any major symptoms on the left side.  He describes the pain as aching and dull and sharp, worse with flexion and extension of the neck and lateral rotation, does get some relief with lying down.    Pain intervention history:  He has been doing physical therapy for his neck, has not had much benefit with this.  He does have a history of lumbar stenosis, had undergone epidural steroid injections and radiofrequency ablation but eventually the pain worsened and he underwent surgery by Dr. Johnson.  He is status post L2-L5 decompression in 2016.      ROS:  He reports easy bruising and back pain.  Balance of review of systems is negative.    Past Medical History:   Diagnosis Date    Amaurosis fugax of right eye 4/2014    resolved    Anticoagulant long-term use     Arm mass, left 6/20/2019    Bilateral venous insufficiency     legs    Calculus of gallbladder without mention of cholecystitis or obstruction     Cardiac  arrhythmia     has implanted loop recorder, palpitations. Hx dizziness on bending over    Cellulitis and abscess of leg April 2014    treated at wound care center, resolving    Coronary artery disease     denies chest pain, denies MI, no stents; sees Dr Grant    Estrogen excess 08/2017    Generalized osteoarthrosis, involving multiple sites     Hyperlipidemia     Hypertension     Hypertension 10/7/2015    Hypothyroidism     Knee pain     right far worse than left    Obesity, unspecified     Occlusion and stenosis of carotid artery without mention of cerebral infarction     Primary osteoarthritis of right knee 08/2017    Sleep apnea with use of continuous positive airway pressure (CPAP)     Status post placement of implantable loop recorder     Thyroid disease     Transient arterial occlusion of retina     Transient visual loss     BOTH EYE  PER DR CAMPOVERDE  7/27/2015       Past Surgical History:   Procedure Laterality Date    BACK SURGERY N/A     decompression of L2 to L5     CARDIAC SURGERY  2013?    insertion loop recorder    CARDIAC SURGERY  2014    transesophageal echo; no clot seen, per patient    CARPAL TUNNEL RELEASE      bilateral    CHOLECYSTECTOMY      EVLT Right     HIATAL HERNIA REPAIR  2011    injection SI joint  7/201    JOINT REPLACEMENT      right knee   august 2017    KNEE ARTHROSCOPY Bilateral     right x 2; left x 1    LAPAROSCOPIC GASTRIC BANDING      LHC      LUMBAR EPIDURAL INJECTION      POLYPECTOMY      vocal cord    RHIZOTOMY W/ RADIOFREQUENCY ABLATION  2016    lumbar    ROTATOR CUFF REPAIR      right    SURGICAL REMOVAL OF MASS OF UPPER EXTREMITY Left 6/20/2019    Procedure: EXCISION, MASS, UPPER EXTREMITY;  Surgeon: Max Mandel MD;  Location: [facility];  Service: General;  Laterality: Left;    vocal cord surgery  1975       Social History     Socioeconomic History    Marital status:      Spouse name: Not on file    Number of children: Not  "on file    Years of education: Not on file    Highest education level: Not on file   Occupational History    Not on file   Social Needs    Financial resource strain: Not hard at all    Food insecurity:     Worry: Never true     Inability: Never true    Transportation needs:     Medical: No     Non-medical: No   Tobacco Use    Smoking status: Never Smoker    Smokeless tobacco: Never Used   Substance and Sexual Activity    Alcohol use: Yes     Frequency: 4 or more times a week     Drinks per session: 3 or 4     Binge frequency: Less than monthly     Comment: daily  16 oz daily     Drug use: Never    Sexual activity: Yes     Partners: Female     Birth control/protection: None   Lifestyle    Physical activity:     Days per week: 0 days     Minutes per session: 0 min    Stress: Only a little   Relationships    Social connections:     Talks on phone: More than three times a week     Gets together: Twice a week     Attends Mosque service: Not on file     Active member of club or organization: Yes     Attends meetings of clubs or organizations: More than 4 times per year     Relationship status:    Other Topics Concern    Not on file   Social History Narrative    Not on file         Medications/Allergies: See med card    Vitals:    01/09/20 0829   BP: (!) 102/54   Pulse: 64   Resp: 18   Temp: 97.4 °F (36.3 °C)   TempSrc: Oral   SpO2: 98%   Weight: 119.2 kg (262 lb 10.9 oz)   Height: 5' 11" (1.803 m)   PainSc:   2   PainLoc: Neck         Physical exam:  Gen: A and O x3, pleasant, well-groomed  Skin: No rashes or obvious lesions  HEENT: PERRLA, no obvious deformities on ears or in canals.Trachea midline.  CVS: Regular rate and rhythm, normal palpable pulses.  Resp: Clear to auscultation bilaterally, no wheezes or rales.  Abdomen: Soft, NT/ND.  Musculoskeletal: Able to heel walk, toe walk. No antalgic gait.     Neuro:  Upper extremities: 5/5 strength bilaterally   Reflexes: Brachioradialis 1+, Bicep " 1+, Tricep 1+.   Sensory: Intact and symmetrical to light touch and pinprick in C2-T1 dermatomes bilaterally.    Cervical Spine:  Cervical spine:  Range of motion is full with flexion with no increased pain, mildly reduced with extension with increased pain in the right neck, left lateral rotation causes mild left neck pain, right lateral rotation is moderately reduced and causes increased right neck pain from about C3-C6.  Spurling's maneuver causes ipsilateral pain on the right side.  Myofascial exam: No Tenderness to palpation across cervical paraspinous region bilaterally.    Imagin19 MRI C-spine:  C2-C3: Small central disc osteophyte complex.  Minimal left facet hypertrophy.  No significant spinal canal or foraminal stenosis.  C3-C4: Mild disc osteophyte complex with possible tiny central soft disc extrusion.  Moderate right and minimal left facet hypertrophy.  Slight ventral cord flattening with thin sliver preserved ventral and preserved dorsal CSF.  Moderate-severe right and mild-moderate left foraminal stenosis.  C4-C5: Mild disc osteophyte complex with prominent central component likely small central disc extrusion..  Mild-moderate bilateral facet hypertrophy.  Ligamentum flavum thickening.  Mild ventral cord flattening with thin sliver preserved ventral and preserved dorsal CSF.  Mild-moderate bilateral foraminal stenosis.  C5-C6: Moderate disc osteophyte complex with likely small central disc extrusion.  Mild bilateral facet hypertrophy.  Ligamentum flavum thickening.  Moderate cord flattening with effacement of ventral and thin sliver preserved dorsal CSF.  Moderate bilateral foraminal stenosis.  C6-C7: Mild disc osteophyte complex with likely small central disc extrusion.  Preserved ventral and dorsal CSF.  Moderate bilateral foraminal stenosis.  C7-T1: Minimal disc osteophyte complex.  Mild left facet hypertrophy.  No significant spinal canal or foraminal stenosis.    4/15/19 EMG lower ext:    1. Subacute on chronic, moderate to severe, right lumbosacral polyradiculopathy most prominent at the L5 nerve root, with slight active denervation in the L4, L5, and S1 territories.  2. Subacute on chronic, moderate to severe, left lumbosacral polyradiculopathy most prominent of the L5-S1 level, with slight active denervation noted at the L4 level.  3. The symmetric absence of the sural sensory response can be seen in this age group.  The absence of the superficial peroneal sensory response is likely secondary to a more proximal dorsal root ganglion in the setting of L4/5 radiculopathy.There is no convincing evidence of a peripheral neuropathy, focal neuropathy or plexopathy on this study.  In addition, no myopathic changes were noted.    Assessment:   The patient is a 64-year-old man with a history of hypertension, CAD, loop recorder, venous insufficiency who presents in referral from Dr. Crawford for neck pain.    1. Cervical spondylosis  Vital signs    Place 18-22 [facility] IV     Verify informed consent    Notify physician     Notify physician     Notify physician (specify)    Diet NPO    Case Request Operating Room: Block-nerve-medial branch-cervical C5, C6    Place in Outpatient    lactated ringers infusion   2. DDD (degenerative disc disease), cervical     3. Cervical stenosis of spinal canal         Plan:  1.  We reviewed his symptoms, reviewed his cervical spine MRI.  He does have some canal narrowing at C5/6, lesser so at C4/5 but also foraminal narrowing out to the right side at C3/4 that is fairly significant.  His symptoms however are staying in the right side of his neck, no radicular symptoms at this point.  I agree that this pain is likely facet mediated and I am going to set up a diagnostic C5, C6 medial branch block on the right side.  If he gets relief with this, we can proceed radiofrequency ablation.  We discussed that if he does not have relief with this, we may consider medial branch  blocks higher up and finally possible epidural steroid injections.  The patient understands and agrees to this plan, he has had similar workups for his lumbar spine prior to his surgery.  We will contact him the day after the medial branch block and proceed from there.    Thank you for referring this interesting patient, and I look forward to continuing to collaborate in his care.

## 2020-01-09 NOTE — LETTER
January 10, 2020      Elver Crawford MD  [address] [facility] [address]  [address]  [facility] [address]           [facility]  [address] [place] [address]  Phone: [phone]  Fax: [phone]          Patient: Arthur Moreno   MR Number: [id number]   YOB: 1955   Date of Visit: 1/9/2020       Dear Dr. Elver Crawford:    Thank you for referring Arthur Moreno to me for evaluation. Attached you will find relevant portions of my assessment and plan of care.    If you have questions, please do not hesitate to call me. I look forward to following Arthur Moreno along with you.    Sincerely,    Kedar Amado MD    Enclosure  CC:  No Recipients    If you would like to receive this communication electronically, please contact [email or web address] or [phone] to request more information on [email or web address] access.    For providers and/or their staff who would like to refer a patient to [facility], please contact us through our one-stop-shop provider referral line, [facility], at [phone].    If you feel you have received this communication in error or would no longer like to receive these types of communications, please e-mail [email or web address]

## 2020-01-09 NOTE — PROGRESS NOTES
This note was completed with dictation software and grammatical errors may exist.    CC:Neck pain    HPI:  The patient is a 64-year-old man with a history of hypertension, CAD, loop recorder, venous insufficiency who presents in referral from Dr. Crawford for neck pain. The patient is a 64-year-old man with a history of CAD on aspirin, obesity, lumbar stenosis status post surgery, gout who presents in referral from Dr. Crawford for right-sided neck pain. Patient denies any specific trauma, reports having no longstanding history of neck pain.  However about 18 months ago he began having discomfort in the right side of his neck.  It is worse with turning his head side to side especially with right lateral rotation.  It stays in the right neck and radiates up to the occiput but denies any radiation into the parietal region, no headaches with involved with this.  He denies any major radiation into the arms, no numbness or tingling, no weakness in his arms.  He denies any balance issues or dexterity issues.  He denies any major symptoms on the left side.  He describes the pain as aching and dull and sharp, worse with flexion and extension of the neck and lateral rotation, does get some relief with lying down.    Pain intervention history:  He has been doing physical therapy for his neck, has not had much benefit with this.  He does have a history of lumbar stenosis, had undergone epidural steroid injections and radiofrequency ablation but eventually the pain worsened and he underwent surgery by Dr. Johnson.  He is status post L2-L5 decompression in 2016.      ROS:  He reports easy bruising and back pain.  Balance of review of systems is negative.    Past Medical History:   Diagnosis Date    Amaurosis fugax of right eye 4/2014    resolved    Anticoagulant long-term use     Arm mass, left 6/20/2019    Bilateral venous insufficiency     legs    Calculus of gallbladder without mention of cholecystitis or obstruction     Cardiac  arrhythmia     has implanted loop recorder, palpitations. Hx dizziness on bending over    Cellulitis and abscess of leg April 2014    treated at wound care center, resolving    Coronary artery disease     denies chest pain, denies MI, no stents; sees Dr Grant    Estrogen excess 08/2017    Generalized osteoarthrosis, involving multiple sites     Hyperlipidemia     Hypertension     Hypertension 10/7/2015    Hypothyroidism     Knee pain     right far worse than left    Obesity, unspecified     Occlusion and stenosis of carotid artery without mention of cerebral infarction     Primary osteoarthritis of right knee 08/2017    Sleep apnea with use of continuous positive airway pressure (CPAP)     Status post placement of implantable loop recorder     Thyroid disease     Transient arterial occlusion of retina     Transient visual loss     BOTH EYE  PER DR CAMPOVERDE  7/27/2015       Past Surgical History:   Procedure Laterality Date    BACK SURGERY N/A     decompression of L2 to L5     CARDIAC SURGERY  2013?    insertion loop recorder    CARDIAC SURGERY  2014    transesophageal echo; no clot seen, per patient    CARPAL TUNNEL RELEASE      bilateral    CHOLECYSTECTOMY      EVLT Right     HIATAL HERNIA REPAIR  2011    injection SI joint  7/201    JOINT REPLACEMENT      right knee   august 2017    KNEE ARTHROSCOPY Bilateral     right x 2; left x 1    LAPAROSCOPIC GASTRIC BANDING      LHC      LUMBAR EPIDURAL INJECTION      POLYPECTOMY      vocal cord    RHIZOTOMY W/ RADIOFREQUENCY ABLATION  2016    lumbar    ROTATOR CUFF REPAIR      right    SURGICAL REMOVAL OF MASS OF UPPER EXTREMITY Left 6/20/2019    Procedure: EXCISION, MASS, UPPER EXTREMITY;  Surgeon: Max Mandel MD;  Location: [facility] OR;  Service: General;  Laterality: Left;    vocal cord surgery  1975       Social History     Socioeconomic History    Marital status:      Spouse name: Not on file    Number of children: Not  "on file    Years of education: Not on file    Highest education level: Not on file   Occupational History    Not on file   Social Needs    Financial resource strain: Not hard at all    Food insecurity:     Worry: Never true     Inability: Never true    Transportation needs:     Medical: No     Non-medical: No   Tobacco Use    Smoking status: Never Smoker    Smokeless tobacco: Never Used   Substance and Sexual Activity    Alcohol use: Yes     Frequency: 4 or more times a week     Drinks per session: 3 or 4     Binge frequency: Less than monthly     Comment: daily  16 oz daily     Drug use: Never    Sexual activity: Yes     Partners: Female     Birth control/protection: None   Lifestyle    Physical activity:     Days per week: 0 days     Minutes per session: 0 min    Stress: Only a little   Relationships    Social connections:     Talks on phone: More than three times a week     Gets together: Twice a week     Attends Rastafari service: Not on file     Active member of club or organization: Yes     Attends meetings of clubs or organizations: More than 4 times per year     Relationship status:    Other Topics Concern    Not on file   Social History Narrative    Not on file         Medications/Allergies: See med card    Vitals:    01/09/20 0829   BP: (!) 102/54   Pulse: 64   Resp: 18   Temp: 97.4 °F (36.3 °C)   TempSrc: Oral   SpO2: 98%   Weight: 119.2 kg (262 lb 10.9 oz)   Height: 5' 11" (1.803 m)   PainSc:   2   PainLoc: Neck         Physical exam:  Gen: A and O x3, pleasant, well-groomed  Skin: No rashes or obvious lesions  HEENT: PERRLA, no obvious deformities on ears or in canals.Trachea midline.  CVS: Regular rate and rhythm, normal palpable pulses.  Resp: Clear to auscultation bilaterally, no wheezes or rales.  Abdomen: Soft, NT/ND.  Musculoskeletal: Able to heel walk, toe walk. No antalgic gait.     Neuro:  Upper extremities: 5/5 strength bilaterally   Reflexes: Brachioradialis 1+, Bicep " 1+, Tricep 1+.   Sensory: Intact and symmetrical to light touch and pinprick in C2-T1 dermatomes bilaterally.    Cervical Spine:  Cervical spine:  Range of motion is full with flexion with no increased pain, mildly reduced with extension with increased pain in the right neck, left lateral rotation causes mild left neck pain, right lateral rotation is moderately reduced and causes increased right neck pain from about C3-C6.  Spurling's maneuver causes ipsilateral pain on the right side.  Myofascial exam: No Tenderness to palpation across cervical paraspinous region bilaterally.    Imagin19 MRI C-spine:  C2-C3: Small central disc osteophyte complex.  Minimal left facet hypertrophy.  No significant spinal canal or foraminal stenosis.  C3-C4: Mild disc osteophyte complex with possible tiny central soft disc extrusion.  Moderate right and minimal left facet hypertrophy.  Slight ventral cord flattening with thin sliver preserved ventral and preserved dorsal CSF.  Moderate-severe right and mild-moderate left foraminal stenosis.  C4-C5: Mild disc osteophyte complex with prominent central component likely small central disc extrusion..  Mild-moderate bilateral facet hypertrophy.  Ligamentum flavum thickening.  Mild ventral cord flattening with thin sliver preserved ventral and preserved dorsal CSF.  Mild-moderate bilateral foraminal stenosis.  C5-C6: Moderate disc osteophyte complex with likely small central disc extrusion.  Mild bilateral facet hypertrophy.  Ligamentum flavum thickening.  Moderate cord flattening with effacement of ventral and thin sliver preserved dorsal CSF.  Moderate bilateral foraminal stenosis.  C6-C7: Mild disc osteophyte complex with likely small central disc extrusion.  Preserved ventral and dorsal CSF.  Moderate bilateral foraminal stenosis.  C7-T1: Minimal disc osteophyte complex.  Mild left facet hypertrophy.  No significant spinal canal or foraminal stenosis.    4/15/19 EMG lower ext:    1. Subacute on chronic, moderate to severe, right lumbosacral polyradiculopathy most prominent at the L5 nerve root, with slight active denervation in the L4, L5, and S1 territories.  2. Subacute on chronic, moderate to severe, left lumbosacral polyradiculopathy most prominent of the L5-S1 level, with slight active denervation noted at the L4 level.  3. The symmetric absence of the sural sensory response can be seen in this age group.  The absence of the superficial peroneal sensory response is likely secondary to a more proximal dorsal root ganglion in the setting of L4/5 radiculopathy.There is no convincing evidence of a peripheral neuropathy, focal neuropathy or plexopathy on this study.  In addition, no myopathic changes were noted.    Assessment:   The patient is a 64-year-old man with a history of hypertension, CAD, loop recorder, venous insufficiency who presents in referral from Dr. Crawford for neck pain.    1. Cervical spondylosis  Vital signs    Place 18-22 [facility] IV     Verify informed consent    Notify physician     Notify physician     Notify physician (specify)    Diet NPO    Case Request Operating Room: Block-nerve-medial branch-cervical C5, C6    Place in Outpatient    lactated ringers infusion   2. DDD (degenerative disc disease), cervical     3. Cervical stenosis of spinal canal         Plan:  1.  We reviewed his symptoms, reviewed his cervical spine MRI.  He does have some canal narrowing at C5/6, lesser so at C4/5 but also foraminal narrowing out to the right side at C3/4 that is fairly significant.  His symptoms however are staying in the right side of his neck, no radicular symptoms at this point.  I agree that this pain is likely facet mediated and I am going to set up a diagnostic C5, C6 medial branch block on the right side.  If he gets relief with this, we can proceed radiofrequency ablation.  We discussed that if he does not have relief with this, we may consider medial branch  blocks higher up and finally possible epidural steroid injections.  The patient understands and agrees to this plan, he has had similar workups for his lumbar spine prior to his surgery.  We will contact him the day after the medial branch block and proceed from there.    Thank you for referring this interesting patient, and I look forward to continuing to collaborate in his care.

## 2020-01-09 NOTE — H&P (VIEW-ONLY)
This note was completed with dictation software and grammatical errors may exist.    CC:Neck pain    HPI:  The patient is a 64-year-old man with a history of hypertension, CAD, loop recorder, venous insufficiency who presents in referral from Dr. Crawford for neck pain. The patient is a 64-year-old man with a history of CAD on aspirin, obesity, lumbar stenosis status post surgery, gout who presents in referral from Dr. Crawford for right-sided neck pain. Patient denies any specific trauma, reports having no longstanding history of neck pain.  However about 18 months ago he began having discomfort in the right side of his neck.  It is worse with turning his head side to side especially with right lateral rotation.  It stays in the right neck and radiates up to the occiput but denies any radiation into the parietal region, no headaches with involved with this.  He denies any major radiation into the arms, no numbness or tingling, no weakness in his arms.  He denies any balance issues or dexterity issues.  He denies any major symptoms on the left side.  He describes the pain as aching and dull and sharp, worse with flexion and extension of the neck and lateral rotation, does get some relief with lying down.    Pain intervention history:  He has been doing physical therapy for his neck, has not had much benefit with this.  He does have a history of lumbar stenosis, had undergone epidural steroid injections and radiofrequency ablation but eventually the pain worsened and he underwent surgery by Dr. Johnson.  He is status post L2-L5 decompression in 2016.      ROS:  He reports easy bruising and back pain.  Balance of review of systems is negative.    Past Medical History:   Diagnosis Date    Amaurosis fugax of right eye 4/2014    resolved    Anticoagulant long-term use     Arm mass, left 6/20/2019    Bilateral venous insufficiency     legs    Calculus of gallbladder without mention of cholecystitis or obstruction     Cardiac  arrhythmia     has implanted loop recorder, palpitations. Hx dizziness on bending over    Cellulitis and abscess of leg April 2014    treated at wound care center, resolving    Coronary artery disease     denies chest pain, denies MI, no stents; sees Dr Grant    Estrogen excess 08/2017    Generalized osteoarthrosis, involving multiple sites     Hyperlipidemia     Hypertension     Hypertension 10/7/2015    Hypothyroidism     Knee pain     right far worse than left    Obesity, unspecified     Occlusion and stenosis of carotid artery without mention of cerebral infarction     Primary osteoarthritis of right knee 08/2017    Sleep apnea with use of continuous positive airway pressure (CPAP)     Status post placement of implantable loop recorder     Thyroid disease     Transient arterial occlusion of retina     Transient visual loss     BOTH EYE  PER DR CAMPOVERDE  7/27/2015       Past Surgical History:   Procedure Laterality Date    BACK SURGERY N/A     decompression of L2 to L5     CARDIAC SURGERY  2013?    insertion loop recorder    CARDIAC SURGERY  2014    transesophageal echo; no clot seen, per patient    CARPAL TUNNEL RELEASE      bilateral    CHOLECYSTECTOMY      EVLT Right     HIATAL HERNIA REPAIR  2011    injection SI joint  7/201    JOINT REPLACEMENT      right knee   august 2017    KNEE ARTHROSCOPY Bilateral     right x 2; left x 1    LAPAROSCOPIC GASTRIC BANDING      LHC      LUMBAR EPIDURAL INJECTION      POLYPECTOMY      vocal cord    RHIZOTOMY W/ RADIOFREQUENCY ABLATION  2016    lumbar    ROTATOR CUFF REPAIR      right    SURGICAL REMOVAL OF MASS OF UPPER EXTREMITY Left 6/20/2019    Procedure: EXCISION, MASS, UPPER EXTREMITY;  Surgeon: Max Mandel MD;  [facility] OR;  Service: General;  Laterality: Left;    vocal cord surgery  1975       Social History     Socioeconomic History    Marital status:      Spouse name: Not on file    Number of children: Not  "on file    Years of education: Not on file    Highest education level: Not on file   Occupational History    Not on file   Social Needs    Financial resource strain: Not hard at all    Food insecurity:     Worry: Never true     Inability: Never true    Transportation needs:     Medical: No     Non-medical: No   Tobacco Use    Smoking status: Never Smoker    Smokeless tobacco: Never Used   Substance and Sexual Activity    Alcohol use: Yes     Frequency: 4 or more times a week     Drinks per session: 3 or 4     Binge frequency: Less than monthly     Comment: daily  16 oz daily     Drug use: Never    Sexual activity: Yes     Partners: Female     Birth control/protection: None   Lifestyle    Physical activity:     Days per week: 0 days     Minutes per session: 0 min    Stress: Only a little   Relationships    Social connections:     Talks on phone: More than three times a week     Gets together: Twice a week     Attends [employer] service: Not on file     Active member of club or organization: Yes     Attends meetings of clubs or organizations: More than 4 times per year     Relationship status:    Other Topics Concern    Not on file   Social History Narrative    Not on file         Medications/Allergies: See med card    Vitals:    01/09/20 0829   BP: (!) 102/54   Pulse: 64   Resp: 18   Temp: 97.4 °F (36.3 °C)   TempSrc: Oral   SpO2: 98%   Weight: 119.2 kg (262 lb 10.9 oz)   Height: 5' 11" (1.803 m)   PainSc:   2   PainLoc: Neck         Physical exam:  Gen: A and O x3, pleasant, well-groomed  Skin: No rashes or obvious lesions  HEENT: PERRLA, no obvious deformities on ears or in canals.Trachea midline.  CVS: Regular rate and rhythm, normal palpable pulses.  Resp: Clear to auscultation bilaterally, no wheezes or rales.  Abdomen: Soft, NT/ND.  Musculoskeletal: Able to heel walk, toe walk. No antalgic gait.     Neuro:  Upper extremities: 5/5 strength bilaterally   Reflexes: Brachioradialis 1+, Bicep " 1+, Tricep 1+.   Sensory: Intact and symmetrical to light touch and pinprick in C2-T1 dermatomes bilaterally.    Cervical Spine:  Cervical spine:  Range of motion is full with flexion with no increased pain, mildly reduced with extension with increased pain in the right neck, left lateral rotation causes mild left neck pain, right lateral rotation is moderately reduced and causes increased right neck pain from about C3-C6.  Spurling's maneuver causes ipsilateral pain on the right side.  Myofascial exam: No Tenderness to palpation across cervical paraspinous region bilaterally.    Imagin19 MRI C-spine:  C2-C3: Small central disc osteophyte complex.  Minimal left facet hypertrophy.  No significant spinal canal or foraminal stenosis.  C3-C4: Mild disc osteophyte complex with possible tiny central soft disc extrusion.  Moderate right and minimal left facet hypertrophy.  Slight ventral cord flattening with thin sliver preserved ventral and preserved dorsal CSF.  Moderate-severe right and mild-moderate left foraminal stenosis.  C4-C5: Mild disc osteophyte complex with prominent central component likely small central disc extrusion..  Mild-moderate bilateral facet hypertrophy.  Ligamentum flavum thickening.  Mild ventral cord flattening with thin sliver preserved ventral and preserved dorsal CSF.  Mild-moderate bilateral foraminal stenosis.  C5-C6: Moderate disc osteophyte complex with likely small central disc extrusion.  Mild bilateral facet hypertrophy.  Ligamentum flavum thickening.  Moderate cord flattening with effacement of ventral and thin sliver preserved dorsal CSF.  Moderate bilateral foraminal stenosis.  C6-C7: Mild disc osteophyte complex with likely small central disc extrusion.  Preserved ventral and dorsal CSF.  Moderate bilateral foraminal stenosis.  C7-T1: Minimal disc osteophyte complex.  Mild left facet hypertrophy.  No significant spinal canal or foraminal stenosis.    4/15/19 EMG lower ext:    1. Subacute on chronic, moderate to severe, right lumbosacral polyradiculopathy most prominent at the L5 nerve root, with slight active denervation in the L4, L5, and S1 territories.  2. Subacute on chronic, moderate to severe, left lumbosacral polyradiculopathy most prominent of the L5-S1 level, with slight active denervation noted at the L4 level.  3. The symmetric absence of the sural sensory response can be seen in this age group.  The absence of the superficial peroneal sensory response is likely secondary to a more proximal dorsal root ganglion in the setting of L4/5 radiculopathy.There is no convincing evidence of a peripheral neuropathy, focal neuropathy or plexopathy on this study.  In addition, no myopathic changes were noted.    Assessment:   The patient is a 64-year-old man with a history of hypertension, CAD, loop recorder, venous insufficiency who presents in referral from Dr. Crawford for neck pain.    1. Cervical spondylosis  Vital signs    Place 18-22 [facility] IV     Verify informed consent    Notify physician     Notify physician     Notify physician (specify)    Diet NPO    Case Request Operating Room: Block-nerve-medial branch-cervical C5, C6    Place in Outpatient    lactated ringers infusion   2. DDD (degenerative disc disease), cervical     3. Cervical stenosis of spinal canal         Plan:  1.  We reviewed his symptoms, reviewed his cervical spine MRI.  He does have some canal narrowing at C5/6, lesser so at C4/5 but also foraminal narrowing out to the right side at C3/4 that is fairly significant.  His symptoms however are staying in the right side of his neck, no radicular symptoms at this point.  I agree that this pain is likely facet mediated and I am going to set up a diagnostic C5, C6 medial branch block on the right side.  If he gets relief with this, we can proceed radiofrequency ablation.  We discussed that if he does not have relief with this, we may consider medial branch  blocks higher up and finally possible epidural steroid injections.  The patient understands and agrees to this plan, he has had similar workups for his lumbar spine prior to his surgery.  We will contact him the day after the medial branch block and proceed from there.    Thank you for referring this interesting patient, and I look forward to continuing to collaborate in his care.

## 2020-01-17 ENCOUNTER — HOSPITAL ENCOUNTER (OUTPATIENT)
Facility: HOSPITAL | Age: 65
Discharge: HOME | End: 2020-01-17
Attending: ANESTHESIOLOGY | Admitting: ANESTHESIOLOGY
Payer: MEDICARE

## 2020-01-17 ENCOUNTER — HOSPITAL ENCOUNTER (OUTPATIENT)
Dept: RADIOLOGY | Facility: HOSPITAL | Age: 65
Discharge: HOME | End: 2020-01-17
Attending: ANESTHESIOLOGY | Admitting: ANESTHESIOLOGY
Payer: MEDICARE

## 2020-01-17 VITALS
OXYGEN SATURATION: 97 % | WEIGHT: 262 LBS | SYSTOLIC BLOOD PRESSURE: 95 MMHG | HEART RATE: 55 BPM | HEIGHT: 71 IN | BODY MASS INDEX: 36.68 KG/M2 | RESPIRATION RATE: 14 BRPM | TEMPERATURE: 99 F | DIASTOLIC BLOOD PRESSURE: 50 MMHG

## 2020-01-17 DIAGNOSIS — M47.22 CERVICAL SPONDYLOSIS WITH RADICULOPATHY: ICD-10-CM

## 2020-01-17 DIAGNOSIS — M47.812 CERVICAL SPONDYLOSIS: Primary | ICD-10-CM

## 2020-01-17 PROCEDURE — 99152 MOD SED SAME PHYS/QHP 5/>YRS: CPT | Mod: ,,, | Performed by: ANESTHESIOLOGY

## 2020-01-17 PROCEDURE — 99152 PR MOD CONSCIOUS SEDATION, SAME PHYS, 5+ YRS, FIRST 15 MIN: ICD-10-PCS | Mod: ,,, | Performed by: ANESTHESIOLOGY

## 2020-01-17 PROCEDURE — 25000003 PHARM REV CODE 250: Mod: PO | Performed by: ANESTHESIOLOGY

## 2020-01-17 PROCEDURE — 64490 INJ PARAVERT F JNT C/T 1 LEV: CPT | Mod: PO | Performed by: ANESTHESIOLOGY

## 2020-01-17 PROCEDURE — 64494 INJ PARAVERT F JNT L/S 2 LEV: CPT | Mod: PO | Performed by: ANESTHESIOLOGY

## 2020-01-17 PROCEDURE — 64490 PR INJ DX/THER AGNT PARAVERT FACET JOINT,IMG GUIDE,CERV/THORAC, 1ST LEVEL: ICD-10-PCS | Mod: RT,,, | Performed by: ANESTHESIOLOGY

## 2020-01-17 PROCEDURE — 64493 INJ PARAVERT F JNT L/S 1 LEV: CPT | Mod: PO | Performed by: ANESTHESIOLOGY

## 2020-01-17 PROCEDURE — 64490 INJ PARAVERT F JNT C/T 1 LEV: CPT | Mod: RT,,, | Performed by: ANESTHESIOLOGY

## 2020-01-17 PROCEDURE — 25500020 PHARM REV CODE 255: Mod: PO | Performed by: ANESTHESIOLOGY

## 2020-01-17 PROCEDURE — 63600175 PHARM REV CODE 636 W HCPCS: Mod: PO | Performed by: ANESTHESIOLOGY

## 2020-01-17 PROCEDURE — 76000 FLUOROSCOPY <1 HR PHYS/QHP: CPT | Mod: TC,PO

## 2020-01-17 RX ORDER — LIDOCAINE HYDROCHLORIDE 10 MG/ML
1 INJECTION INFILTRATION; PERINEURAL ONCE
Status: COMPLETED | OUTPATIENT
Start: 2020-01-17 | End: 2020-01-17

## 2020-01-17 RX ORDER — SODIUM CHLORIDE, SODIUM LACTATE, POTASSIUM CHLORIDE, CALCIUM CHLORIDE 600; 310; 30; 20 MG/100ML; MG/100ML; MG/100ML; MG/100ML
INJECTION, SOLUTION INTRAVENOUS CONTINUOUS
Status: DISCONTINUED | OUTPATIENT
Start: 2020-01-17 | End: 2020-01-17 | Stop reason: HOSPADM

## 2020-01-17 RX ORDER — LIDOCAINE HYDROCHLORIDE 10 MG/ML
INJECTION, SOLUTION EPIDURAL; INFILTRATION; INTRACAUDAL; PERINEURAL
Status: DISCONTINUED | OUTPATIENT
Start: 2020-01-17 | End: 2020-01-17 | Stop reason: HOSPADM

## 2020-01-17 RX ORDER — BUPIVACAINE HYDROCHLORIDE 2.5 MG/ML
INJECTION, SOLUTION EPIDURAL; INFILTRATION; INTRACAUDAL
Status: DISCONTINUED | OUTPATIENT
Start: 2020-01-17 | End: 2020-01-17 | Stop reason: HOSPADM

## 2020-01-17 RX ORDER — MIDAZOLAM HYDROCHLORIDE 5 MG/ML
INJECTION INTRAMUSCULAR; INTRAVENOUS
Status: DISCONTINUED | OUTPATIENT
Start: 2020-01-17 | End: 2020-01-17 | Stop reason: HOSPADM

## 2020-01-17 RX ADMIN — SODIUM CHLORIDE, SODIUM LACTATE, POTASSIUM CHLORIDE, AND CALCIUM CHLORIDE: .6; .31; .03; .02 INJECTION, SOLUTION INTRAVENOUS at 01:01

## 2020-01-17 RX ADMIN — LIDOCAINE HYDROCHLORIDE: 10 INJECTION, SOLUTION EPIDURAL; INFILTRATION; INTRACAUDAL; PERINEURAL at 01:01

## 2020-01-17 NOTE — OP NOTE
PROCEDURE DATE: 1/17/2020    PROCEDURE:  Cervical medial branch block of the right C5 and C6 medial branch nerves on the right-side utilizing fluoroscopy    DIAGNOSIS:  Cervical spondylosis    PHYSICIAN: Kedar Amado MD    MEDICATIONS INJECTED:  0.25% bupivicaine, 0.5ml at each level.    LOCAL ANESTHETIC USED:   1% lidocaine, 1ml at each level.    SEDATION MEDICATIONS: 2mg versed    ESTIMATED BLOOD LOSS:  none    COMPLICATIONS:  none    TECHNIQUE : A time-out was taken to identify patient and procedure side prior to starting the procedure.  The patient was positioned prone with the site of interest side up. The patient was prepped and draped in the usual sterile fashion using ChloraPrep and sterile towels.  The level was determined under fluoroscopic guidance using a slightly posteriorly oblique view.   Local anesthetic was infiltrated superficially at the skin level.  Then, a 22 gauge 5 inch needle was inserted to the anatomic location of the midsection of the lateral masses of the right C5 and C6. A cross table view was then taken to ensure that needles did not cross into neural foramina. Omnipaque contrast dye was then injected to assess appropriate spread and that there was no vascular uptake. The above noted medication was then injected. The patient tolerated the procedure well.     The patient was monitored after the procedure. The patient will be contacted tomorrow to determine the extent of relief. The patient was given post procedure and discharge instructions to follow at home. The patient was discharged in a stable condition    Event Time In Time Out   In Facility [id number]    In Pre-Procedure 1236    Physician Available     Anesthesia Available     Pre-Op: Bedside Procedure Start     Pre-Op: Bedside Procedure Stop     Pre-Procedure Complete 1308    Out of Pre-Procedure     Anesthesia Start     Anesthesia Start Data Collection     Setup Start     Setup Complete     In Room 1401    Prep Start     Procedure  Prep Complete     Procedure Start 1411    Procedure Closing     Emergence     Procedure Finish 1419    Sedation Start 1400    Scope In     Extent Reached     Scope Out     Sedation End 1422    Out of Room 1422    Cleanup Start     Cleanup Complete     Cosmetic Start     Cosmetic Stop     Pain Mgmt In Room     Pain Mgmt Out Room     In Recovery     Anesthesia Finish     Bedside Procedure Start     Bedside Procedure Stop     Recovery Care Complete     Out of Recovery     To Phase II     In Phase II     Pain Mgmt Recovery Start     Pain Mgmt Recovery Stop     Obs Rec Start     Obs Rec Stop     Phase II Care Complete     Out of Phase II     Procedural Care Complete     Discharge     Pain Follow Up Needed     Pain Follow Up Complete

## 2020-01-17 NOTE — DISCHARGE SUMMARY
[facility]  Discharge Note  Short Stay    Admit Date: 1/17/2020    Discharge Date: 1/17/2020    Attending Physician: Kedar Amado MD     Discharge Provider: Kedar Amado    Diagnoses:  Active Hospital Problems    Diagnosis  POA    *Cervical spondylosis [M47.812]  Yes      Resolved Hospital Problems   No resolved problems to display.       Discharged Condition: good    Final Diagnoses: Cervical spondylosis [M47.812]    Disposition: Home or Self Care    Hospital Course: no complications, uneventful    Outcome of Hospitalization, Treatment, Procedure, or Surgery:  Patient was admitted for outpatient procedure. The patient underwent procedure without complications and are discharged home    Follow up/Patient Instructions:  Follow up as scheduled in Pain Management clinic in 3-4 weeks/Patient has received instructions and follow up date and time    Medications:  Continue previous medications    Discharge Procedure Orders   Call MD for:  temperature >100.4     Call MD for:  severe uncontrolled pain     Call MD for:  redness, tenderness, or signs of infection (pain, swelling, redness, odor or green/yellow discharge around incision site)     Call MD for:  severe persistent headache     No dressing needed         Discharge Procedure Orders (must include Diet, Follow-up, Activity):   Discharge Procedure Orders (must include Diet, Follow-up, Activity)   Call MD for:  temperature >100.4     Call MD for:  severe uncontrolled pain     Call MD for:  redness, tenderness, or signs of infection (pain, swelling, redness, odor or green/yellow discharge around incision site)     Call MD for:  severe persistent headache     No dressing needed

## 2020-01-17 NOTE — DISCHARGE INSTRUCTIONS
PAIN MANAGEMENT    Home care instructions   Apply ice pack to the injection site for 20 minute prior for the first 24 hours for soreness/discomfort at injection site   DO NOT USE HEAT FOR 24 HOURS   Keep site clean and dry for 24 hours, remove bandaid when desired   Do not drive until tomorrow  Take care when walking after a lumbar injection     STEROIDS OR RADIOFREQUENCY    May take 10-14 days for full effects  Avoid strenuous exercises for 2 days      BLOCKS  Resume regular activities today  Pain office will call in next 2 days      Resume Aspirin, Plavix, or Coumadin the day after the procedure unless other wise instructed  Resume home medication as prescribed today      CALL PHYSICIAN FOR:   Severe increase in your usual pain or appearance of new pain   Prolonged or increasing weakness or numbness in the legs or arms   Fever greater then 100 degrees F..   Drainage from the incision site, redness, active bleeding or increased swelling at the injection site   Headache that increases when your head is upright and decreases when you lie flat    FOR EMERGENCIES:   Go directly to Emergency Department for Shortness of breath, chest pain, or problems breathing

## 2020-01-22 ENCOUNTER — TELEPHONE (OUTPATIENT)
Dept: PAIN MEDICINE | Facility: CLINIC | Age: 65
End: 2020-01-22

## 2020-01-22 NOTE — TELEPHONE ENCOUNTER
Spoke with patient regarding the block. He stated he received 75% relief and this lasted about 8 hours. During that time he was able to cook for a several hours. The cervical RFA has been scheduled for 2/6. Pre-op instructions were reviewed and sent to patient.

## 2020-01-24 ENCOUNTER — TELEPHONE (OUTPATIENT)
Dept: PAIN MEDICINE | Facility: CLINIC | Age: 65
End: 2020-01-24

## 2020-01-24 DIAGNOSIS — M47.812 CERVICAL SPONDYLOSIS: Primary | ICD-10-CM

## 2020-01-24 RX ORDER — SODIUM CHLORIDE, SODIUM LACTATE, POTASSIUM CHLORIDE, CALCIUM CHLORIDE 600; 310; 30; 20 MG/100ML; MG/100ML; MG/100ML; MG/100ML
INJECTION, SOLUTION INTRAVENOUS CONTINUOUS
Status: CANCELLED | OUTPATIENT
Start: 2020-02-06

## 2020-01-24 NOTE — TELEPHONE ENCOUNTER
Patient is scheduled for a cervical radiofrequency ablation with Dr. Amado on 2/6 and will need to stop the aspirin 7 days before. Please advise if this okay. Thanks.

## 2020-02-06 ENCOUNTER — HOSPITAL ENCOUNTER (OUTPATIENT)
Dept: RADIOLOGY | Facility: HOSPITAL | Age: 65
Discharge: HOME | End: 2020-02-06
Attending: ANESTHESIOLOGY | Admitting: ANESTHESIOLOGY
Payer: MEDICARE

## 2020-02-06 ENCOUNTER — HOSPITAL ENCOUNTER (OUTPATIENT)
Facility: HOSPITAL | Age: 65
Discharge: HOME | End: 2020-02-06
Attending: ANESTHESIOLOGY | Admitting: ANESTHESIOLOGY
Payer: MEDICARE

## 2020-02-06 VITALS
SYSTOLIC BLOOD PRESSURE: 105 MMHG | TEMPERATURE: 98 F | OXYGEN SATURATION: 98 % | HEART RATE: 66 BPM | DIASTOLIC BLOOD PRESSURE: 62 MMHG | RESPIRATION RATE: 18 BRPM

## 2020-02-06 DIAGNOSIS — M47.812 CERVICAL SPONDYLOSIS: ICD-10-CM

## 2020-02-06 DIAGNOSIS — M47.812 CERVICAL SPONDYLOSIS: Primary | ICD-10-CM

## 2020-02-06 PROCEDURE — 64634 PR DESTROY C/TH FACET JNT ADDL: ICD-10-PCS | Mod: RT,,, | Performed by: ANESTHESIOLOGY

## 2020-02-06 PROCEDURE — 64634 DESTROY C/TH FACET JNT ADDL: CPT | Mod: RT,,, | Performed by: ANESTHESIOLOGY

## 2020-02-06 PROCEDURE — 99152 PR MOD CONSCIOUS SEDATION, SAME PHYS, 5+ YRS, FIRST 15 MIN: ICD-10-PCS | Mod: ,,, | Performed by: ANESTHESIOLOGY

## 2020-02-06 PROCEDURE — 76000 FLUOROSCOPY <1 HR PHYS/QHP: CPT | Mod: TC,PO

## 2020-02-06 PROCEDURE — 99152 MOD SED SAME PHYS/QHP 5/>YRS: CPT | Mod: ,,, | Performed by: ANESTHESIOLOGY

## 2020-02-06 PROCEDURE — 64633 PR DESTROY CERV/THOR FACET JNT: ICD-10-PCS | Mod: RT,,, | Performed by: ANESTHESIOLOGY

## 2020-02-06 PROCEDURE — 64633 DESTROY CERV/THOR FACET JNT: CPT | Mod: RT,,, | Performed by: ANESTHESIOLOGY

## 2020-02-06 PROCEDURE — 25000003 PHARM REV CODE 250: Mod: PO | Performed by: ANESTHESIOLOGY

## 2020-02-06 PROCEDURE — 64633 DESTROY CERV/THOR FACET JNT: CPT | Mod: PO | Performed by: ANESTHESIOLOGY

## 2020-02-06 PROCEDURE — 63600175 PHARM REV CODE 636 W HCPCS: Mod: PO | Performed by: ANESTHESIOLOGY

## 2020-02-06 RX ORDER — LIDOCAINE HYDROCHLORIDE 10 MG/ML
INJECTION, SOLUTION EPIDURAL; INFILTRATION; INTRACAUDAL; PERINEURAL
Status: DISCONTINUED | OUTPATIENT
Start: 2020-02-06 | End: 2020-02-06 | Stop reason: HOSPADM

## 2020-02-06 RX ORDER — FENTANYL CITRATE 50 UG/ML
INJECTION, SOLUTION INTRAMUSCULAR; INTRAVENOUS
Status: DISCONTINUED | OUTPATIENT
Start: 2020-02-06 | End: 2020-02-06 | Stop reason: HOSPADM

## 2020-02-06 RX ORDER — SODIUM CHLORIDE, SODIUM LACTATE, POTASSIUM CHLORIDE, CALCIUM CHLORIDE 600; 310; 30; 20 MG/100ML; MG/100ML; MG/100ML; MG/100ML
INJECTION, SOLUTION INTRAVENOUS CONTINUOUS
Status: DISCONTINUED | OUTPATIENT
Start: 2020-02-06 | End: 2020-02-06 | Stop reason: HOSPADM

## 2020-02-06 RX ORDER — MIDAZOLAM HYDROCHLORIDE 2 MG/2ML
INJECTION, SOLUTION INTRAMUSCULAR; INTRAVENOUS
Status: DISCONTINUED | OUTPATIENT
Start: 2020-02-06 | End: 2020-02-06 | Stop reason: HOSPADM

## 2020-02-06 RX ADMIN — SODIUM CHLORIDE, SODIUM LACTATE, POTASSIUM CHLORIDE, AND CALCIUM CHLORIDE: .6; .31; .03; .02 INJECTION, SOLUTION INTRAVENOUS at 02:02

## 2020-02-06 NOTE — OP NOTE
PROCEDURE DATE: 2/6/2020    PROCEDURE:  Radiofrequency ablation of the right C5 and C6 medial branch nerves on the right-side under fluoroscopy    DIAGNOSIS:  Cervical spondylosis    Post Op Diagnosis: Same    PHYSICIAN: Kedar Amado MD    MEDICATIONS INJECTED:  From a mixture of 4ml of 2% lidocaine and 40mg of methylprednisone, 1ml of this solution was injected at each level.    LOCAL ANESTHETIC USED:   1ml of lidocaine 1% at each level    SEDATION MEDICATIONS: 2mg versed, 50mcg fentanyl    ESTIMATED BLOOD LOSS:  none    COMPLICATIONS:  none    TECHNIQUE:   A time-out taken to identify patient and procedure side prior to starting the procedure.  The patient was positioned prone with the site of interest side up. The patient was prepped and draped in the usual sterile fashion using ChloraPrep and sterile towels.  The levels were determined under fluoroscopic guidance using a slightly posteriorly oblique view.   Local anesthetic was infiltrated superficially at the skin.  Then a 100 mm 22g bent tip [place] RF needle was inserted to the anatomic location of the midsection of the lateral masses of C5 and C6 on the right. Impedance was less than 800 ohms at each level. Motor stimulation up to 2 volts confirmed there was no nerve root involvement at each level. Medication was then injected slowly.  Ablation was done per level utilizing Octavio radiofrequency generator at 80°C for 90 seconds. The patient tolerated the procedure well.     The patient was monitored after the procedure.  Patient was given post procedure and discharge instructions to follow at home.  The patient was discharged in a stable condition    Event Time In Time Out   In Facility [id number]    In Pre-Procedure 1416    Physician Available     Anesthesia Available     Pre-Op: Bedside Procedure Start     Pre-Op: Bedside Procedure Stop     Pre-Procedure Complete 1432    Out of Pre-Procedure     Anesthesia Start     Anesthesia Start Data Collection     Setup  Start     Setup Complete     In Room 1439    Prep Start     Procedure Prep Complete     Procedure Start 1446    Procedure Closing     Emergence     Procedure Finish 1459    Sedation Start 1438    Scope In     Extent Reached     Scope Out     Sedation End 1501    Out of Room 1441    Cleanup Start     Cleanup Complete     Cosmetic Start     Cosmetic Stop     Pain Mgmt In Room     Pain Mgmt Out Room     In Recovery     Anesthesia Finish     Bedside Procedure Start     Bedside Procedure Stop     Recovery Care Complete     Out of Recovery     To Phase II     In Phase II     Pain Mgmt Recovery Start     Pain Mgmt Recovery Stop     Obs Rec Start     Obs Rec Stop     Phase II Care Complete     Out of Phase II     Procedural Care Complete     Discharge     Pain Follow Up Needed     Pain Follow Up Complete

## 2020-02-06 NOTE — DISCHARGE INSTRUCTIONS
PAIN MANAGEMENT    Home care instructions   Apply ice pack to the injection site for 20 minute prior for the first 24 hours for soreness/discomfort at injection site   DO NOT USE HEAT FOR 24 HOURS   Keep site clean and dry for 24 hours, remove bandaid when desired   Do not drive until tomorrow  Take care when walking after a lumbar injection     STEROIDS OR RADIOFREQUENCY    May take 10-14 days for full effects  Avoid strenuous exercises for 2 days            Resume Aspirin, Plavix, or Coumadin the day after the procedure unless other wise instructed  Resume home medication as prescribed today      CALL PHYSICIAN FOR:   Severe increase in your usual pain or appearance of new pain   Prolonged or increasing weakness or numbness in the legs or arms   Fever greater then 100 degrees F..   Drainage from the incision site, redness, active bleeding or increased swelling at the injection site   Headache that increases when your head is upright and decreases when you lie flat    FOR EMERGENCIES:   Go directly to Emergency Department for Shortness of breath, chest pain, or problems breathing

## 2020-02-06 NOTE — DISCHARGE SUMMARY
[facility]  Discharge Note  Short Stay    Admit Date: 2/6/2020    Discharge Date: 2/6/2020    Attending Physician: Kedar Amado MD     Discharge Provider: Kedar Amado    Diagnoses:  Active Hospital Problems    Diagnosis  POA    *Cervical spondylosis [M47.812]  Yes      Resolved Hospital Problems   No resolved problems to display.       Discharged Condition: good    Final Diagnoses: Cervical spondylosis [M47.812]    Disposition: Home or Self Care    Hospital Course: no complications, uneventful    Outcome of Hospitalization, Treatment, Procedure, or Surgery:  Patient was admitted for outpatient procedure. The patient underwent procedure without complications and are discharged home    Follow up/Patient Instructions:  Follow up as scheduled in Pain Management clinic in 3-4 weeks/Patient has received instructions and follow up date and time    Medications:  Continue previous medications    Discharge Procedure Orders   Call MD for:  temperature >100.4     Call MD for:  severe uncontrolled pain     Call MD for:  redness, tenderness, or signs of infection (pain, swelling, redness, odor or green/yellow discharge around incision site)     Call MD for:  severe persistent headache     No dressing needed         Discharge Procedure Orders (must include Diet, Follow-up, Activity):   Discharge Procedure Orders (must include Diet, Follow-up, Activity)   Call MD for:  temperature >100.4     Call MD for:  severe uncontrolled pain     Call MD for:  redness, tenderness, or signs of infection (pain, swelling, redness, odor or green/yellow discharge around incision site)     Call MD for:  severe persistent headache     No dressing needed

## 2020-03-05 ENCOUNTER — TELEPHONE (OUTPATIENT)
Dept: PAIN MEDICINE | Facility: CLINIC | Age: 65
End: 2020-03-05

## 2020-03-05 ENCOUNTER — OFFICE VISIT (OUTPATIENT)
Dept: PAIN MEDICINE | Facility: CLINIC | Age: 65
End: 2020-03-05
Payer: MEDICARE

## 2020-03-05 VITALS
SYSTOLIC BLOOD PRESSURE: 116 MMHG | BODY MASS INDEX: 37.48 KG/M2 | HEART RATE: 62 BPM | DIASTOLIC BLOOD PRESSURE: 70 MMHG | TEMPERATURE: 98 F | RESPIRATION RATE: 20 BRPM | WEIGHT: 268.75 LBS

## 2020-03-05 DIAGNOSIS — M48.02 CERVICAL SPINAL STENOSIS: ICD-10-CM

## 2020-03-05 DIAGNOSIS — M54.12 CERVICAL RADICULOPATHY: Primary | ICD-10-CM

## 2020-03-05 DIAGNOSIS — M47.812 CERVICAL SPONDYLOSIS: ICD-10-CM

## 2020-03-05 PROCEDURE — 99999 PR PBB SHADOW E&M-EST. PATIENT-LVL V: ICD-10-PCS | Mod: PBBFAC,,, | Performed by: ANESTHESIOLOGY

## 2020-03-05 PROCEDURE — 3008F PR BODY MASS INDEX (BMI) DOCUMENTED: ICD-10-PCS | Mod: CPTII,S$GLB,, | Performed by: ANESTHESIOLOGY

## 2020-03-05 PROCEDURE — 3074F PR MOST RECENT SYSTOLIC BLOOD PRESSURE < 130 MM HG: ICD-10-PCS | Mod: CPTII,S$GLB,, | Performed by: ANESTHESIOLOGY

## 2020-03-05 PROCEDURE — 99999 PR PBB SHADOW E&M-EST. PATIENT-LVL V: CPT | Mod: PBBFAC,,, | Performed by: ANESTHESIOLOGY

## 2020-03-05 PROCEDURE — 3074F SYST BP LT 130 MM HG: CPT | Mod: CPTII,S$GLB,, | Performed by: ANESTHESIOLOGY

## 2020-03-05 PROCEDURE — 3078F PR MOST RECENT DIASTOLIC BLOOD PRESSURE < 80 MM HG: ICD-10-PCS | Mod: CPTII,S$GLB,, | Performed by: ANESTHESIOLOGY

## 2020-03-05 PROCEDURE — 99214 PR OFFICE/OUTPT VISIT, EST, LEVL IV, 30-39 MIN: ICD-10-PCS | Mod: S$GLB,,, | Performed by: ANESTHESIOLOGY

## 2020-03-05 PROCEDURE — 1101F PR PT FALLS ASSESS DOC 0-1 FALLS W/OUT INJ PAST YR: ICD-10-PCS | Mod: CPTII,S$GLB,, | Performed by: ANESTHESIOLOGY

## 2020-03-05 PROCEDURE — 3008F BODY MASS INDEX DOCD: CPT | Mod: CPTII,S$GLB,, | Performed by: ANESTHESIOLOGY

## 2020-03-05 PROCEDURE — 1101F PT FALLS ASSESS-DOCD LE1/YR: CPT | Mod: CPTII,S$GLB,, | Performed by: ANESTHESIOLOGY

## 2020-03-05 PROCEDURE — 99214 OFFICE O/P EST MOD 30 MIN: CPT | Mod: S$GLB,,, | Performed by: ANESTHESIOLOGY

## 2020-03-05 PROCEDURE — 3078F DIAST BP <80 MM HG: CPT | Mod: CPTII,S$GLB,, | Performed by: ANESTHESIOLOGY

## 2020-03-05 RX ORDER — SODIUM CHLORIDE, SODIUM LACTATE, POTASSIUM CHLORIDE, CALCIUM CHLORIDE 600; 310; 30; 20 MG/100ML; MG/100ML; MG/100ML; MG/100ML
INJECTION, SOLUTION INTRAVENOUS CONTINUOUS
Status: CANCELLED | OUTPATIENT
Start: 2020-03-18

## 2020-03-05 NOTE — TELEPHONE ENCOUNTER
Patient is scheduled for a cervical steroid injection with Dr. Amado on 3/18 and will need to stop the aspirin 7 days before. Please advise if this is okay. Thanks.

## 2020-03-05 NOTE — PROGRESS NOTES
This note was completed with dictation software and grammatical errors may exist.    CC:Neck pain    HPI:  The patient is a 64-year-old man with a history of hypertension, CAD, loop recorder, venous insufficiency who presents in referral from Dr. Crawford for neck pain. He is status post right C5 and C6 medial branch block on 01/17/2020 with greater than 80% relief and then radiofrequency ablation procedure on 02/06/2020 with what he describes as 50% relief of his neck pain.  It sounds like the right-sided neck pain has largely resolved but he still has bilateral trapezius pain that radiates into the shoulder blades and into the shoulders.  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  He reports that any prolonged vigorous work causes the aching pain in his neck.  Does have improved range of motion.  He denies any major numbness or weakness in his arms.  He denies any balance issues.      Previous history:  The patient is a 64-year-old man with a history of CAD on aspirin, obesity, lumbar stenosis status post surgery, gout who presents in referral from Dr. Crawford for right-sided neck pain. Patient denies any specific trauma, reports having no longstanding history of neck pain.  However about 18 months ago he began having discomfort in the right side of his neck.  It is worse with turning his head side to side especially with right lateral rotation.  It stays in the right neck and radiates up to the occiput but denies any radiation into the parietal region, no headaches with involved with this.  He denies any major radiation into the arms, no numbness or tingling, no weakness in his arms.  He denies any balance issues or dexterity issues.  He denies any major symptoms on the left side.  He describes the pain as aching and dull and sharp, worse with flexion and extension of the neck and lateral rotation, does get some relief with lying down.    Pain intervention history:  He has been  doing physical therapy for his neck, has not had much benefit with this.  He does have a history of lumbar stenosis, had undergone epidural steroid injections and radiofrequency ablation but eventually the pain worsened and he underwent surgery by Dr. Johnson.  He is status post L2-L5 decompression in 2016. He is status post right C5 and C6 medial branch block on 01/17/2020 with greater than 80% relief and then radiofrequency ablation procedure on 02/06/2020 with what he describes as 50% relief of his neck pain.       ROS:  He reports easy bruising and back pain.  Balance of review of systems is negative.    Past Medical History:   Diagnosis Date    Amaurosis fugax of right eye 4/2014    resolved    Anticoagulant long-term use     Arm mass, left 6/20/2019    Bilateral venous insufficiency     legs    Calculus of gallbladder without mention of cholecystitis or obstruction     Cardiac arrhythmia     has implanted loop recorder, palpitations. Hx dizziness on bending over    Cellulitis and abscess of leg April 2014    treated at wound care center, resolving    Coronary artery disease     denies chest pain, denies MI, no stents; sees Dr Grant    Estrogen excess 08/2017    Generalized osteoarthrosis, involving multiple sites     Hyperlipidemia     Hypertension     Hypertension 10/7/2015    Hypothyroidism     Knee pain     right far worse than left    Obesity, unspecified     Occlusion and stenosis of carotid artery without mention of cerebral infarction     Primary osteoarthritis of right knee 08/2017    Sleep apnea with use of continuous positive airway pressure (CPAP)     Status post placement of implantable loop recorder     Thyroid disease     Transient arterial occlusion of retina     Transient visual loss     BOTH EYE  PER DR CAMPOVERDE  7/27/2015       Past Surgical History:   Procedure Laterality Date    BACK SURGERY N/A     decompression of L2 to L5     CARDIAC SURGERY  2013?    insertion  loop recorder    CARDIAC SURGERY  2014    transesophageal echo; no clot seen, per patient    CARPAL TUNNEL RELEASE      bilateral    CHOLECYSTECTOMY      EVLT Right     HIATAL HERNIA REPAIR  2011    INJECTION OF ANESTHETIC AGENT AROUND MEDIAL BRANCH NERVES INNERVATING CERVICAL FACET JOINT Right 1/17/2020    Procedure: Block-nerve-medial branch-cervical C5, C6;  Surgeon: Kedar Amado MD;  Location: [facility] OR;  Service: Pain Management;  Laterality: Right;    injection SI joint  7/201    JOINT REPLACEMENT      right knee   august 2017    KNEE ARTHROSCOPY Bilateral     right x 2; left x 1    LAPAROSCOPIC GASTRIC BANDING      C      LUMBAR EPIDURAL INJECTION      POLYPECTOMY      vocal cord    RADIOFREQUENCY THERMAL COAGULATION OF MEDIAL BRANCH OF POSTERIOR RAMUS OF CERVICAL SPINAL NERVE Right 2/6/2020    Procedure: RADIOFREQUENCY THERMAL COAGULATION, NERVE, SPINAL, CERVICAL, POSTERIOR RAMUS, MEDIAL BRANCH C5, C6;  Surgeon: Kedar Amado MD;  Location: [facility] OR;  Service: Pain Management;  Laterality: Right;    RHIZOTOMY W/ RADIOFREQUENCY ABLATION  2016    lumbar    ROTATOR CUFF REPAIR      right    SURGICAL REMOVAL OF MASS OF UPPER EXTREMITY Left 6/20/2019    Procedure: EXCISION, MASS, UPPER EXTREMITY;  Surgeon: Max Mandel MD;  Location: [facility];  Service: General;  Laterality: Left;    vocal cord surgery  1975       Social History     Socioeconomic History    Marital status:      Spouse name: Not on file    Number of children: Not on file    Years of education: Not on file    Highest education level: Not on file   Occupational History    Not on file   Social Needs    Financial resource strain: Not hard at all    Food insecurity:     Worry: Never true     Inability: Never true    Transportation needs:     Medical: No     Non-medical: No   Tobacco Use    Smoking status: Never Smoker    Smokeless tobacco: Never Used   Substance and Sexual Activity    Alcohol use: Yes      Frequency: 4 or more times a week     Drinks per session: 3 or 4     Binge frequency: Less than monthly     Comment: daily  16 oz daily     Drug use: Never    Sexual activity: Yes     Partners: Female     Birth control/protection: None   Lifestyle    Physical activity:     Days per week: 0 days     Minutes per session: 0 min    Stress: Only a little   Relationships    Social connections:     Talks on phone: More than three times a week     Gets together: Twice a week     Attends Bahai service: Not on file     Active member of club or organization: Yes     Attends meetings of clubs or organizations: More than 4 times per year     Relationship status:    Other Topics Concern    Not on file   Social History Narrative    Not on file         Medications/Allergies: See med card    Vitals:    03/05/20 1030   BP: 116/70   Pulse: 62   Resp: 20   Temp: 98.2 °F (36.8 °C)   TempSrc: Oral   Weight: 121.9 kg (268 lb 11.9 oz)   PainSc:   2   PainLoc: Neck         Physical exam:  Gen: A and O x3, pleasant, well-groomed  Skin: No rashes or obvious lesions  HEENT: PERRLA, no obvious deformities on ears or in canals.Trachea midline.  CVS: Regular rate and rhythm, normal palpable pulses.  Resp: Clear to auscultation bilaterally, no wheezes or rales.  Abdomen: Soft, NT/ND.  Musculoskeletal: Able to heel walk, toe walk. No antalgic gait.     Neuro:  Upper extremities: 5/5 strength bilaterally   Reflexes: Brachioradialis 1+, Bicep 1+, Tricep 1+.   Sensory: Intact and symmetrical to light touch and pinprick in C2-T1 dermatomes bilaterally.    Cervical Spine:  Cervical spine:  Range of motion is full with flexion with no immediate increased pain, moderately reduced with extension with increased pain at the base of his neck and into the bilateral trapezius.    Spurling's maneuver causes mild increased pain to either side.    Myofascial exam: No Tenderness to palpation across cervical paraspinous region  bilaterally.    Imagin19 MRI C-spine:  C2-C3: Small central disc osteophyte complex.  Minimal left facet hypertrophy.  No significant spinal canal or foraminal stenosis.  C3-C4: Mild disc osteophyte complex with possible tiny central soft disc extrusion.  Moderate right and minimal left facet hypertrophy.  Slight ventral cord flattening with thin sliver preserved ventral and preserved dorsal CSF.  Moderate-severe right and mild-moderate left foraminal stenosis.  C4-C5: Mild disc osteophyte complex with prominent central component likely small central disc extrusion..  Mild-moderate bilateral facet hypertrophy.  Ligamentum flavum thickening.  Mild ventral cord flattening with thin sliver preserved ventral and preserved dorsal CSF.  Mild-moderate bilateral foraminal stenosis.  C5-C6: Moderate disc osteophyte complex with likely small central disc extrusion.  Mild bilateral facet hypertrophy.  Ligamentum flavum thickening.  Moderate cord flattening with effacement of ventral and thin sliver preserved dorsal CSF.  Moderate bilateral foraminal stenosis.  C6-C7: Mild disc osteophyte complex with likely small central disc extrusion.  Preserved ventral and dorsal CSF.  Moderate bilateral foraminal stenosis.  C7-T1: Minimal disc osteophyte complex.  Mild left facet hypertrophy.  No significant spinal canal or foraminal stenosis.    4/15/19 EMG lower ext:   1. Subacute on chronic, moderate to severe, right lumbosacral polyradiculopathy most prominent at the L5 nerve root, with slight active denervation in the L4, L5, and S1 territories.  2. Subacute on chronic, moderate to severe, left lumbosacral polyradiculopathy most prominent of the L5-S1 level, with slight active denervation noted at the L4 level.  3. The symmetric absence of the sural sensory response can be seen in this age group.  The absence of the superficial peroneal sensory response is likely secondary to a more proximal dorsal root ganglion in the  setting of L4/5 radiculopathy.There is no convincing evidence of a peripheral neuropathy, focal neuropathy or plexopathy on this study.  In addition, no myopathic changes were noted.    Assessment:   The patient is a 64-year-old man with a history of hypertension, CAD, loop recorder, venous insufficiency who presents in referral from Dr. Crawford for neck pain.    1. Cervical radiculopathy  Vital signs    Place 18-22 gaua peripheral IV     Verify informed consent    Notify physician     Notify physician     Notify physician (specify)    Diet NPO    Case Request Operating Room: Injection-steroid-epidural-cervical    Place in Outpatient    lactated ringers infusion   2. Cervical spondylosis     3. Cervical spinal stenosis         Plan:  1.  We discussed that his remaining pain may be secondary to his spinal canal stenosis most prevalent at C5/6 but also C4/5 and C3/4.  We discussed that an epidural steroid injection could be tried, if he has relief with this and it is prolonged, he can repeat these as needed.  If he is not having prolonged relief, we could always consider sending him back to physical therapy.  Since he does not have any myelopathy any major symptoms into the arms, I do not think he is a candidate for any surgical procedures.  We will see him in follow-up in about 4 weeks after the injection or sooner as needed.  2.  He is high risk, on anti-platelet aspirin daily so we will need to get approval for him to discontinue his prior to the injection.

## 2020-03-17 ENCOUNTER — TELEPHONE (OUTPATIENT)
Dept: PAIN MEDICINE | Facility: CLINIC | Age: 65
End: 2020-03-17

## 2020-03-17 NOTE — TELEPHONE ENCOUNTER
Spoke with patient and the procedure has been canceled for now due co-vid 19 protocol. Patient verbalized understanding and will be called in the future to reschedule procedure.

## 2020-04-29 ENCOUNTER — TELEPHONE (OUTPATIENT)
Dept: PAIN MEDICINE | Facility: CLINIC | Age: 65
End: 2020-04-29

## 2020-04-29 NOTE — TELEPHONE ENCOUNTER
Patient was informed that we are still unable to schedule procedures at this time. Approximate date is June 1st but this is subject to change. Patient verbalized understanding. Will call back once we are able to reschedule.

## 2020-05-06 ENCOUNTER — TELEPHONE (OUTPATIENT)
Dept: PAIN MEDICINE | Facility: CLINIC | Age: 65
End: 2020-05-06

## 2020-06-05 NOTE — TELEPHONE ENCOUNTER
Spoke to patient regarding scheduled EMG and follow up appointment with Dr. Johnson. Date, time, and location confirmed.    Unclear if it is safe to use aspirin 81 mg daily.  Will consult with Hematology.

## 2020-07-18 PROBLEM — F10.20 ETOH DEPENDENCE: Status: ACTIVE | Noted: 2020-07-18

## 2020-08-26 ENCOUNTER — OFFICE VISIT (OUTPATIENT)
Dept: PAIN MEDICINE | Facility: CLINIC | Age: 65
End: 2020-08-26
Payer: MEDICARE

## 2020-08-26 VITALS
SYSTOLIC BLOOD PRESSURE: 130 MMHG | HEIGHT: 71 IN | HEART RATE: 60 BPM | RESPIRATION RATE: 18 BRPM | BODY MASS INDEX: 38.08 KG/M2 | DIASTOLIC BLOOD PRESSURE: 67 MMHG | WEIGHT: 272 LBS

## 2020-08-26 DIAGNOSIS — M48.02 CERVICAL SPINAL STENOSIS: ICD-10-CM

## 2020-08-26 DIAGNOSIS — Z13.9 SCREENING PROCEDURE: ICD-10-CM

## 2020-08-26 DIAGNOSIS — M47.812 CERVICAL SPONDYLOSIS: ICD-10-CM

## 2020-08-26 DIAGNOSIS — M51.36 DDD (DEGENERATIVE DISC DISEASE), LUMBAR: ICD-10-CM

## 2020-08-26 DIAGNOSIS — M47.816 LUMBAR SPONDYLOSIS: Primary | ICD-10-CM

## 2020-08-26 DIAGNOSIS — M48.061 SPINAL STENOSIS OF LUMBAR REGION WITHOUT NEUROGENIC CLAUDICATION: ICD-10-CM

## 2020-08-26 DIAGNOSIS — M54.12 CERVICAL RADICULOPATHY: ICD-10-CM

## 2020-08-26 PROCEDURE — 1101F PT FALLS ASSESS-DOCD LE1/YR: CPT | Mod: CPTII,S$GLB,, | Performed by: ANESTHESIOLOGY

## 2020-08-26 PROCEDURE — 99214 OFFICE O/P EST MOD 30 MIN: CPT | Mod: S$GLB,,, | Performed by: ANESTHESIOLOGY

## 2020-08-26 PROCEDURE — 3078F DIAST BP <80 MM HG: CPT | Mod: CPTII,S$GLB,, | Performed by: ANESTHESIOLOGY

## 2020-08-26 PROCEDURE — 99999 PR PBB SHADOW E&M-EST. PATIENT-LVL V: CPT | Mod: PBBFAC,,, | Performed by: ANESTHESIOLOGY

## 2020-08-26 PROCEDURE — 1159F PR MEDICATION LIST DOCUMENTED IN MEDICAL RECORD: ICD-10-PCS | Mod: S$GLB,,, | Performed by: ANESTHESIOLOGY

## 2020-08-26 PROCEDURE — 1101F PR PT FALLS ASSESS DOC 0-1 FALLS W/OUT INJ PAST YR: ICD-10-PCS | Mod: CPTII,S$GLB,, | Performed by: ANESTHESIOLOGY

## 2020-08-26 PROCEDURE — 3075F SYST BP GE 130 - 139MM HG: CPT | Mod: CPTII,S$GLB,, | Performed by: ANESTHESIOLOGY

## 2020-08-26 PROCEDURE — 1159F MED LIST DOCD IN RCRD: CPT | Mod: S$GLB,,, | Performed by: ANESTHESIOLOGY

## 2020-08-26 PROCEDURE — 3075F PR MOST RECENT SYSTOLIC BLOOD PRESS GE 130-139MM HG: ICD-10-PCS | Mod: CPTII,S$GLB,, | Performed by: ANESTHESIOLOGY

## 2020-08-26 PROCEDURE — 1125F AMNT PAIN NOTED PAIN PRSNT: CPT | Mod: S$GLB,,, | Performed by: ANESTHESIOLOGY

## 2020-08-26 PROCEDURE — 3078F PR MOST RECENT DIASTOLIC BLOOD PRESSURE < 80 MM HG: ICD-10-PCS | Mod: CPTII,S$GLB,, | Performed by: ANESTHESIOLOGY

## 2020-08-26 PROCEDURE — 3008F PR BODY MASS INDEX (BMI) DOCUMENTED: ICD-10-PCS | Mod: CPTII,S$GLB,, | Performed by: ANESTHESIOLOGY

## 2020-08-26 PROCEDURE — 3008F BODY MASS INDEX DOCD: CPT | Mod: CPTII,S$GLB,, | Performed by: ANESTHESIOLOGY

## 2020-08-26 PROCEDURE — 99214 PR OFFICE/OUTPT VISIT, EST, LEVL IV, 30-39 MIN: ICD-10-PCS | Mod: S$GLB,,, | Performed by: ANESTHESIOLOGY

## 2020-08-26 PROCEDURE — 1125F PR PAIN SEVERITY QUANTIFIED, PAIN PRESENT: ICD-10-PCS | Mod: S$GLB,,, | Performed by: ANESTHESIOLOGY

## 2020-08-26 PROCEDURE — 99999 PR PBB SHADOW E&M-EST. PATIENT-LVL V: ICD-10-PCS | Mod: PBBFAC,,, | Performed by: ANESTHESIOLOGY

## 2020-08-26 RX ORDER — SODIUM CHLORIDE, SODIUM LACTATE, POTASSIUM CHLORIDE, CALCIUM CHLORIDE 600; 310; 30; 20 MG/100ML; MG/100ML; MG/100ML; MG/100ML
INJECTION, SOLUTION INTRAVENOUS CONTINUOUS
Status: CANCELLED | OUTPATIENT
Start: 2020-09-11

## 2020-08-26 NOTE — H&P (VIEW-ONLY)
This note was completed with dictation software and grammatical errors may exist.    CC:Neck pain    HPI:  The patient is a 64-year-old man with a history of hypertension, CAD, loop recorder, venous insufficiency who presents in referral from Dr. Crawford for neck pain.  In follow-up today for neck pain and back pain.  Since I had last seen him, he had undergone ablation of his neck on the right side but he still has pain radiating into his shoulders and upper back.  We had previously discussed trying an epidural steroid injection but when the viral pandemic began, he canceled.  Since that time however the pain has worsened but what is bothering him the most is bilateral low back pain.  This pain is constant but worse with standing, has to lean forward.  When he walks, the pain worsens and he has to walk with a forward leaning gait.  He denies any radiation of pain is lower legs but does have some and the back of his thighs but not past the knees.  He reports having improvement with sitting down.      Previous history:  The patient is a 64-year-old man with a history of CAD on aspirin, obesity, lumbar stenosis status post surgery, gout who presents in referral from Dr. Crawford for right-sided neck pain. Patient denies any specific trauma, reports having no longstanding history of neck pain.  However about 18 months ago he began having discomfort in the right side of his neck.  It is worse with turning his head side to side especially with right lateral rotation.  It stays in the right neck and radiates up to the occiput but denies any radiation into the parietal region, no headaches with involved with this.  He denies any major radiation into the arms, no numbness or tingling, no weakness in his arms.  He denies any balance issues or dexterity issues.  He denies any major symptoms on the left side.  He describes the pain as aching and dull and sharp, worse with flexion and extension of the neck and lateral rotation, does  get some relief with lying down.    Pain intervention history:  He has been doing physical therapy for his neck, has not had much benefit with this.  He does have a history of lumbar stenosis, had undergone epidural steroid injections and radiofrequency ablation but eventually the pain worsened and he underwent surgery by Dr. Johnson.  He is status post L2-L5 decompression in 2016. He is status post right C5 and C6 medial branch block on 01/17/2020 with greater than 80% relief and then radiofrequency ablation procedure on 02/06/2020 with what he describes as 50% relief of his neck pain.       ROS:  He reports easy bruising and back pain.  Balance of review of systems is negative.    Past Medical History:   Diagnosis Date    Amaurosis fugax of right eye 4/2014    resolved    Anticoagulant long-term use     Arm mass, left 6/20/2019    Bilateral venous insufficiency     legs    Calculus of gallbladder without mention of cholecystitis or obstruction     Cardiac arrhythmia     has implanted loop recorder, palpitations. Hx dizziness on bending over    Cellulitis and abscess of leg April 2014    treated at wound care center, resolving    Coronary artery disease     denies chest pain, denies MI, no stents; sees Dr Grant    Estrogen excess 08/2017    Generalized osteoarthrosis, involving multiple sites     Hyperlipidemia     Hypertension     Hypertension 10/7/2015    Hypothyroidism     Knee pain     right far worse than left    Obesity, unspecified     Occlusion and stenosis of carotid artery without mention of cerebral infarction     Primary osteoarthritis of right knee 08/2017    Sleep apnea with use of continuous positive airway pressure (CPAP)     Status post placement of implantable loop recorder     Thyroid disease     Transient arterial occlusion of retina     Transient visual loss     BOTH EYE  PER DR CAMPOVERDE  7/27/2015       Past Surgical History:   Procedure Laterality Date    BACK SURGERY  N/A     decompression of L2 to L5     CARDIAC SURGERY  2013?    insertion loop recorder    CARDIAC SURGERY  2014    transesophageal echo; no clot seen, per patient    CARPAL TUNNEL RELEASE      bilateral    CHOLECYSTECTOMY      COLONOSCOPY  09/26/2016    Dr. Ryley ANDRES Right     HIATAL HERNIA REPAIR  2011    INJECTION OF ANESTHETIC AGENT AROUND MEDIAL BRANCH NERVES INNERVATING CERVICAL FACET JOINT Right 1/17/2020    Procedure: Block-nerve-medial branch-cervical C5, C6;  Surgeon: Kedar Amado MD;  Location: [facility] OR;  Service: Pain Management;  Laterality: Right;    injection SI joint  7/201    JOINT REPLACEMENT      right knee   august 2017    KNEE ARTHROSCOPY Bilateral     right x 2; left x 1    LAPAROSCOPIC GASTRIC BANDING      C      LUMBAR EPIDURAL INJECTION      POLYPECTOMY      vocal cord    RADIOFREQUENCY THERMAL COAGULATION OF MEDIAL BRANCH OF POSTERIOR RAMUS OF CERVICAL SPINAL NERVE Right 2/6/2020    Procedure: RADIOFREQUENCY THERMAL COAGULATION, NERVE, SPINAL, CERVICAL, POSTERIOR RAMUS, MEDIAL BRANCH C5, C6;  Surgeon: Kedar Amado MD;  Location: [facility] OR;  Service: Pain Management;  Laterality: Right;    RHIZOTOMY W/ RADIOFREQUENCY ABLATION  2016    lumbar    ROTATOR CUFF REPAIR      right    SURGICAL REMOVAL OF MASS OF UPPER EXTREMITY Left 6/20/2019    Procedure: EXCISION, MASS, UPPER EXTREMITY;  Surgeon: Max Mandel MD;  Location: [facility] OR;  Service: General;  Laterality: Left;    vocal cord surgery  1975       Social History     Socioeconomic History    Marital status:      Spouse name: Nuria    Number of children: 0    Years of education: Not on file    Highest education level: Not on file   Occupational History    Not on file   Social Needs    Financial resource strain: Not hard at all    Food insecurity     Worry: Never true     Inability: Never true    Transportation needs     Medical: No     Non-medical: No   Tobacco Use    Smoking status:  "Never Smoker    Smokeless tobacco: Never Used   Substance and Sexual Activity    Alcohol use: Yes     Frequency: 4 or more times a week     Drinks per session: 3 or 4     Binge frequency: Monthly     Comment: increased    Drug use: Never    Sexual activity: Yes     Partners: Female     Birth control/protection: None   Lifestyle    Physical activity     Days per week: 0 days     Minutes per session: 0 min    Stress: Rather much   Relationships    Social connections     Talks on phone: Twice a week     Gets together: Once a week     Attends [employer] service: Not on file     Active member of club or organization: Yes     Attends meetings of clubs or organizations: More than 4 times per year     Relationship status:    Other Topics Concern    Not on file   Social History Narrative    Not on file         Medications/Allergies: See med card    Vitals:    08/26/20 1027   BP: 130/67   Pulse: 60   Resp: 18   Weight: 123.4 kg (272 lb)   Height: 5' 11" (1.803 m)   PainSc:   7         Physical exam:  Gen: A and O x3, pleasant, well-groomed  Skin: No rashes or obvious lesions  HEENT: PERRLA, no obvious deformities on ears or in canals.Trachea midline.  CVS: Regular rate and rhythm, normal palpable pulses.  Resp: Clear to auscultation bilaterally, no wheezes or rales.  Abdomen: Soft, NT/ND.  Musculoskeletal: Able to heel walk, toe walk. No antalgic gait.     Neuro:  Upper extremities: 5/5 strength bilaterally   Reflexes: Brachioradialis 1+, Bicep 1+, Tricep 1+.   Sensory: Intact and symmetrical to light touch and pinprick in C2-T1 dermatomes bilaterally.    Cervical Spine:  Cervical spine:  Range of motion is full with flexion with no immediate increased pain, moderately reduced with extension with increased pain at the base of his neck and into the bilateral trapezius.    Spurling's maneuver causes mild increased pain to either side.    Myofascial exam: No Tenderness to palpation across cervical paraspinous " region bilaterally.      Neuro:  Lower extremities: 5/5 strength bilaterally  Reflexes: Patellar 0+, Achilles 0+ bilaterally.  Sensory:  Intact and symmetrical to light touch and pinprick in L2-S1 dermatomes bilaterally.    Lumbar spine:  Lumbar spine:  Range of motion is mildly reduced with flexion with no major increased pain, severely reduced with extension with increased pain especially on oblique extension either side  Wilton's test causes no increased pain on either side.    Supine straight leg raise is negative bilaterally.    Internal and external rotation of the hip causes no increased pain on either side.  Myofascial exam: No tenderness to palpation across lumbar paraspinous muscles.      Imagin19 MRI C-spine:  C2-C3: Small central disc osteophyte complex.  Minimal left facet hypertrophy.  No significant spinal canal or foraminal stenosis.  C3-C4: Mild disc osteophyte complex with possible tiny central soft disc extrusion.  Moderate right and minimal left facet hypertrophy.  Slight ventral cord flattening with thin sliver preserved ventral and preserved dorsal CSF.  Moderate-severe right and mild-moderate left foraminal stenosis.  C4-C5: Mild disc osteophyte complex with prominent central component likely small central disc extrusion..  Mild-moderate bilateral facet hypertrophy.  Ligamentum flavum thickening.  Mild ventral cord flattening with thin sliver preserved ventral and preserved dorsal CSF.  Mild-moderate bilateral foraminal stenosis.  C5-C6: Moderate disc osteophyte complex with likely small central disc extrusion.  Mild bilateral facet hypertrophy.  Ligamentum flavum thickening.  Moderate cord flattening with effacement of ventral and thin sliver preserved dorsal CSF.  Moderate bilateral foraminal stenosis.  C6-C7: Mild disc osteophyte complex with likely small central disc extrusion.  Preserved ventral and dorsal CSF.  Moderate bilateral foraminal stenosis.  C7-T1: Minimal disc  osteophyte complex.  Mild left facet hypertrophy.  No significant spinal canal or foraminal stenosis.    4/15/19 EMG lower ext:   1. Subacute on chronic, moderate to severe, right lumbosacral polyradiculopathy most prominent at the L5 nerve root, with slight active denervation in the L4, L5, and S1 territories.  2. Subacute on chronic, moderate to severe, left lumbosacral polyradiculopathy most prominent of the L5-S1 level, with slight active denervation noted at the L4 level.  3. The symmetric absence of the sural sensory response can be seen in this age group.  The absence of the superficial peroneal sensory response is likely secondary to a more proximal dorsal root ganglion in the setting of L4/5 radiculopathy.There is no convincing evidence of a peripheral neuropathy, focal neuropathy or plexopathy on this study.  In addition, no myopathic changes were noted.    4/2/19 MRI L-spine:  T12-L1: Mild disc bulge.  Minimal bilateral facet hypertrophy.  Mild narrowing of the right greater than left lateral recess.  No significant overall spinal canal stenosis.  Mild right and minimal left foraminal stenosis.    L1-L2: Mild retrolisthesis.  Mild disc bulge and posterior osteophytic ridging.  Likely small superimposed central/right lateral recess protrusion.  Mild-moderate right facet hypertrophy.  Left facets obscured by artifact.  Mild-moderate narrowing of the bilateral lateral recesses.  Mild overall spinal canal stenosis.  Mild-moderate right and mild left foraminal stenosis.   L2-L3: Left hemilaminectomy.  Mild retrolisthesis.  Spinal canal is patent.  Moderate left and mild-moderate right foraminal stenosis.   L3-L4: Fused.  Mild disc bulge.  Partially obscured bilateral facet hypertrophy, likely moderate on the right greater than left.  Minimal narrowing of bilateral lateral recesses.  No significant overall spinal canal stenosis.  Mild right and minimal left foraminal stenosis.   L4-L5: Fused.  Laminectomy.   Spinal canal is widely patent.  Mild-moderate bilateral foraminal stenosis.   BONES: L2-5 posterior fusion with bilateral pedicle screws and posterior rods with L4 laminectomy and crosslink device at the L4 level.  Left L2-3 hemilaminectomy.  Type 1 endplate changes at L4-5.  No aggressive bone marrow signal.   PARASPINAL AREA: STIR signal hyperintensity in the bilateral dorsal paraspinal muscles from the L3-L5 levels which may represent edema or denervation change.  Bilateral dorsal paraspinal muscular atrophy.  No large fluid collection.      Assessment:   The patient is a 65-year-old man with a history of hypertension, CAD, loop recorder, venous insufficiency who presents in referral from Dr. Crawford for neck pain.    1. Lumbar spondylosis     2. Screening procedure  COVID-19 Routine Screening   3. DDD (degenerative disc disease), lumbar     4. Spinal stenosis of lumbar region without neurogenic claudication     5. Cervical spinal stenosis     6. Cervical spondylosis     7. Cervical radiculopathy         Plan:  1.  We reviewed his symptoms, reviewed his lumbar spine imaging.  We discussed that he has significant facet arthropathy at L5/S1 and since he still has movement at that level, I think that this is causing his pain.  We discussed trying medial branch blocks for diagnostic purposes.  I will set him up for a bilateral L4 and L5 medial branch block to see if this provides improvement and if so we can proceed with radiofrequency ablation.  2.  In terms of his neck pain, I still think some of his pain is due to his cervical canal narrowing, we can set up a cervical epidural steroid injection in the future.

## 2020-08-26 NOTE — PROGRESS NOTES
This note was completed with dictation software and grammatical errors may exist.    CC:Neck pain    HPI:  The patient is a 64-year-old man with a history of hypertension, CAD, loop recorder, venous insufficiency who presents in referral from Dr. Crawford for neck pain.  In follow-up today for neck pain and back pain.  Since I had last seen him, he had undergone ablation of his neck on the right side but he still has pain radiating into his shoulders and upper back.  We had previously discussed trying an epidural steroid injection but when the viral pandemic began, he canceled.  Since that time however the pain has worsened but what is bothering him the most is bilateral low back pain.  This pain is constant but worse with standing, has to lean forward.  When he walks, the pain worsens and he has to walk with a forward leaning gait.  He denies any radiation of pain is lower legs but does have some and the back of his thighs but not past the knees.  He reports having improvement with sitting down.      Previous history:  The patient is a 64-year-old man with a history of CAD on aspirin, obesity, lumbar stenosis status post surgery, gout who presents in referral from Dr. Crawford for right-sided neck pain. Patient denies any specific trauma, reports having no longstanding history of neck pain.  However about 18 months ago he began having discomfort in the right side of his neck.  It is worse with turning his head side to side especially with right lateral rotation.  It stays in the right neck and radiates up to the occiput but denies any radiation into the parietal region, no headaches with involved with this.  He denies any major radiation into the arms, no numbness or tingling, no weakness in his arms.  He denies any balance issues or dexterity issues.  He denies any major symptoms on the left side.  He describes the pain as aching and dull and sharp, worse with flexion and extension of the neck and lateral rotation, does  get some relief with lying down.    Pain intervention history:  He has been doing physical therapy for his neck, has not had much benefit with this.  He does have a history of lumbar stenosis, had undergone epidural steroid injections and radiofrequency ablation but eventually the pain worsened and he underwent surgery by Dr. Johnson.  He is status post L2-L5 decompression in 2016. He is status post right C5 and C6 medial branch block on 01/17/2020 with greater than 80% relief and then radiofrequency ablation procedure on 02/06/2020 with what he describes as 50% relief of his neck pain.       ROS:  He reports easy bruising and back pain.  Balance of review of systems is negative.    Past Medical History:   Diagnosis Date    Amaurosis fugax of right eye 4/2014    resolved    Anticoagulant long-term use     Arm mass, left 6/20/2019    Bilateral venous insufficiency     legs    Calculus of gallbladder without mention of cholecystitis or obstruction     Cardiac arrhythmia     has implanted loop recorder, palpitations. Hx dizziness on bending over    Cellulitis and abscess of leg April 2014    treated at wound care center, resolving    Coronary artery disease     denies chest pain, denies MI, no stents; sees Dr Grant    Estrogen excess 08/2017    Generalized osteoarthrosis, involving multiple sites     Hyperlipidemia     Hypertension     Hypertension 10/7/2015    Hypothyroidism     Knee pain     right far worse than left    Obesity, unspecified     Occlusion and stenosis of carotid artery without mention of cerebral infarction     Primary osteoarthritis of right knee 08/2017    Sleep apnea with use of continuous positive airway pressure (CPAP)     Status post placement of implantable loop recorder     Thyroid disease     Transient arterial occlusion of retina     Transient visual loss     BOTH EYE  PER DR CAMPOVERDE  7/27/2015       Past Surgical History:   Procedure Laterality Date    BACK SURGERY  N/A     decompression of L2 to L5     CARDIAC SURGERY  2013?    insertion loop recorder    CARDIAC SURGERY  2014    transesophageal echo; no clot seen, per patient    CARPAL TUNNEL RELEASE      bilateral    CHOLECYSTECTOMY      COLONOSCOPY  09/26/2016    Dr. Ryley ANDRES Right     HIATAL HERNIA REPAIR  2011    INJECTION OF ANESTHETIC AGENT AROUND MEDIAL BRANCH NERVES INNERVATING CERVICAL FACET JOINT Right 1/17/2020    Procedure: Block-nerve-medial branch-cervical C5, C6;  Surgeon: Kedar Amado MD;  Location: [facility] OR;  Service: Pain Management;  Laterality: Right;    injection SI joint  7/201    JOINT REPLACEMENT      right knee   august 2017    KNEE ARTHROSCOPY Bilateral     right x 2; left x 1    LAPAROSCOPIC GASTRIC BANDING      C      LUMBAR EPIDURAL INJECTION      POLYPECTOMY      vocal cord    RADIOFREQUENCY THERMAL COAGULATION OF MEDIAL BRANCH OF POSTERIOR RAMUS OF CERVICAL SPINAL NERVE Right 2/6/2020    Procedure: RADIOFREQUENCY THERMAL COAGULATION, NERVE, SPINAL, CERVICAL, POSTERIOR RAMUS, MEDIAL BRANCH C5, C6;  Surgeon: Kedar Amado MD;  Location: [facility] OR;  Service: Pain Management;  Laterality: Right;    RHIZOTOMY W/ RADIOFREQUENCY ABLATION  2016    lumbar    ROTATOR CUFF REPAIR      right    SURGICAL REMOVAL OF MASS OF UPPER EXTREMITY Left 6/20/2019    Procedure: EXCISION, MASS, UPPER EXTREMITY;  Surgeon: Max Mandel MD;  Location: [facility] OR;  Service: General;  Laterality: Left;    vocal cord surgery  1975       Social History     Socioeconomic History    Marital status:      Spouse name: Nuria    Number of children: 0    Years of education: Not on file    Highest education level: Not on file   Occupational History    Not on file   Social Needs    Financial resource strain: Not hard at all    Food insecurity     Worry: Never true     Inability: Never true    Transportation needs     Medical: No     Non-medical: No   Tobacco Use    Smoking status:  "Never Smoker    Smokeless tobacco: Never Used   Substance and Sexual Activity    Alcohol use: Yes     Frequency: 4 or more times a week     Drinks per session: 3 or 4     Binge frequency: Monthly     Comment: increased    Drug use: Never    Sexual activity: Yes     Partners: Female     Birth control/protection: None   Lifestyle    Physical activity     Days per week: 0 days     Minutes per session: 0 min    Stress: Rather much   Relationships    Social connections     Talks on phone: Twice a week     Gets together: Once a week     Attends [employer] service: Not on file     Active member of club or organization: Yes     Attends meetings of clubs or organizations: More than 4 times per year     Relationship status:    Other Topics Concern    Not on file   Social History Narrative    Not on file         Medications/Allergies: See med card    Vitals:    08/26/20 1027   BP: 130/67   Pulse: 60   Resp: 18   Weight: 123.4 kg (272 lb)   Height: 5' 11" (1.803 m)   PainSc:   7         Physical exam:  Gen: A and O x3, pleasant, well-groomed  Skin: No rashes or obvious lesions  HEENT: PERRLA, no obvious deformities on ears or in canals.Trachea midline.  CVS: Regular rate and rhythm, normal palpable pulses.  Resp: Clear to auscultation bilaterally, no wheezes or rales.  Abdomen: Soft, NT/ND.  Musculoskeletal: Able to heel walk, toe walk. No antalgic gait.     Neuro:  Upper extremities: 5/5 strength bilaterally   Reflexes: Brachioradialis 1+, Bicep 1+, Tricep 1+.   Sensory: Intact and symmetrical to light touch and pinprick in C2-T1 dermatomes bilaterally.    Cervical Spine:  Cervical spine:  Range of motion is full with flexion with no immediate increased pain, moderately reduced with extension with increased pain at the base of his neck and into the bilateral trapezius.    Spurling's maneuver causes mild increased pain to either side.    Myofascial exam: No Tenderness to palpation across cervical paraspinous " region bilaterally.      Neuro:  Lower extremities: 5/5 strength bilaterally  Reflexes: Patellar 0+, Achilles 0+ bilaterally.  Sensory:  Intact and symmetrical to light touch and pinprick in L2-S1 dermatomes bilaterally.    Lumbar spine:  Lumbar spine:  Range of motion is mildly reduced with flexion with no major increased pain, severely reduced with extension with increased pain especially on oblique extension either side  Wilton's test causes no increased pain on either side.    Supine straight leg raise is negative bilaterally.    Internal and external rotation of the hip causes no increased pain on either side.  Myofascial exam: No tenderness to palpation across lumbar paraspinous muscles.      Imagin19 MRI C-spine:  C2-C3: Small central disc osteophyte complex.  Minimal left facet hypertrophy.  No significant spinal canal or foraminal stenosis.  C3-C4: Mild disc osteophyte complex with possible tiny central soft disc extrusion.  Moderate right and minimal left facet hypertrophy.  Slight ventral cord flattening with thin sliver preserved ventral and preserved dorsal CSF.  Moderate-severe right and mild-moderate left foraminal stenosis.  C4-C5: Mild disc osteophyte complex with prominent central component likely small central disc extrusion..  Mild-moderate bilateral facet hypertrophy.  Ligamentum flavum thickening.  Mild ventral cord flattening with thin sliver preserved ventral and preserved dorsal CSF.  Mild-moderate bilateral foraminal stenosis.  C5-C6: Moderate disc osteophyte complex with likely small central disc extrusion.  Mild bilateral facet hypertrophy.  Ligamentum flavum thickening.  Moderate cord flattening with effacement of ventral and thin sliver preserved dorsal CSF.  Moderate bilateral foraminal stenosis.  C6-C7: Mild disc osteophyte complex with likely small central disc extrusion.  Preserved ventral and dorsal CSF.  Moderate bilateral foraminal stenosis.  C7-T1: Minimal disc  osteophyte complex.  Mild left facet hypertrophy.  No significant spinal canal or foraminal stenosis.    4/15/19 EMG lower ext:   1. Subacute on chronic, moderate to severe, right lumbosacral polyradiculopathy most prominent at the L5 nerve root, with slight active denervation in the L4, L5, and S1 territories.  2. Subacute on chronic, moderate to severe, left lumbosacral polyradiculopathy most prominent of the L5-S1 level, with slight active denervation noted at the L4 level.  3. The symmetric absence of the sural sensory response can be seen in this age group.  The absence of the superficial peroneal sensory response is likely secondary to a more proximal dorsal root ganglion in the setting of L4/5 radiculopathy.There is no convincing evidence of a peripheral neuropathy, focal neuropathy or plexopathy on this study.  In addition, no myopathic changes were noted.    4/2/19 MRI L-spine:  T12-L1: Mild disc bulge.  Minimal bilateral facet hypertrophy.  Mild narrowing of the right greater than left lateral recess.  No significant overall spinal canal stenosis.  Mild right and minimal left foraminal stenosis.    L1-L2: Mild retrolisthesis.  Mild disc bulge and posterior osteophytic ridging.  Likely small superimposed central/right lateral recess protrusion.  Mild-moderate right facet hypertrophy.  Left facets obscured by artifact.  Mild-moderate narrowing of the bilateral lateral recesses.  Mild overall spinal canal stenosis.  Mild-moderate right and mild left foraminal stenosis.   L2-L3: Left hemilaminectomy.  Mild retrolisthesis.  Spinal canal is patent.  Moderate left and mild-moderate right foraminal stenosis.   L3-L4: Fused.  Mild disc bulge.  Partially obscured bilateral facet hypertrophy, likely moderate on the right greater than left.  Minimal narrowing of bilateral lateral recesses.  No significant overall spinal canal stenosis.  Mild right and minimal left foraminal stenosis.   L4-L5: Fused.  Laminectomy.   Spinal canal is widely patent.  Mild-moderate bilateral foraminal stenosis.   BONES: L2-5 posterior fusion with bilateral pedicle screws and posterior rods with L4 laminectomy and crosslink device at the L4 level.  Left L2-3 hemilaminectomy.  Type 1 endplate changes at L4-5.  No aggressive bone marrow signal.   PARASPINAL AREA: STIR signal hyperintensity in the bilateral dorsal paraspinal muscles from the L3-L5 levels which may represent edema or denervation change.  Bilateral dorsal paraspinal muscular atrophy.  No large fluid collection.      Assessment:   The patient is a 65-year-old man with a history of hypertension, CAD, loop recorder, venous insufficiency who presents in referral from Dr. Crawford for neck pain.    1. Lumbar spondylosis     2. Screening procedure  COVID-19 Routine Screening   3. DDD (degenerative disc disease), lumbar     4. Spinal stenosis of lumbar region without neurogenic claudication     5. Cervical spinal stenosis     6. Cervical spondylosis     7. Cervical radiculopathy         Plan:  1.  We reviewed his symptoms, reviewed his lumbar spine imaging.  We discussed that he has significant facet arthropathy at L5/S1 and since he still has movement at that level, I think that this is causing his pain.  We discussed trying medial branch blocks for diagnostic purposes.  I will set him up for a bilateral L4 and L5 medial branch block to see if this provides improvement and if so we can proceed with radiofrequency ablation.  2.  In terms of his neck pain, I still think some of his pain is due to his cervical canal narrowing, we can set up a cervical epidural steroid injection in the future.

## 2020-08-26 NOTE — H&P (VIEW-ONLY)
This note was completed with dictation software and grammatical errors may exist.    CC:Neck pain    HPI:  The patient is a 64-year-old man with a history of hypertension, CAD, loop recorder, venous insufficiency who presents in referral from Dr. Crawford for neck pain.  In follow-up today for neck pain and back pain.  Since I had last seen him, he had undergone ablation of his neck on the right side but he still has pain radiating into his shoulders and upper back.  We had previously discussed trying an epidural steroid injection but when the viral pandemic began, he canceled.  Since that time however the pain has worsened but what is bothering him the most is bilateral low back pain.  This pain is constant but worse with standing, has to lean forward.  When he walks, the pain worsens and he has to walk with a forward leaning gait.  He denies any radiation of pain is lower legs but does have some and the back of his thighs but not past the knees.  He reports having improvement with sitting down.      Previous history:  The patient is a 64-year-old man with a history of CAD on aspirin, obesity, lumbar stenosis status post surgery, gout who presents in referral from Dr. Crawford for right-sided neck pain. Patient denies any specific trauma, reports having no longstanding history of neck pain.  However about 18 months ago he began having discomfort in the right side of his neck.  It is worse with turning his head side to side especially with right lateral rotation.  It stays in the right neck and radiates up to the occiput but denies any radiation into the parietal region, no headaches with involved with this.  He denies any major radiation into the arms, no numbness or tingling, no weakness in his arms.  He denies any balance issues or dexterity issues.  He denies any major symptoms on the left side.  He describes the pain as aching and dull and sharp, worse with flexion and extension of the neck and lateral rotation, does  get some relief with lying down.    Pain intervention history:  He has been doing physical therapy for his neck, has not had much benefit with this.  He does have a history of lumbar stenosis, had undergone epidural steroid injections and radiofrequency ablation but eventually the pain worsened and he underwent surgery by Dr. Johnson.  He is status post L2-L5 decompression in 2016. He is status post right C5 and C6 medial branch block on 01/17/2020 with greater than 80% relief and then radiofrequency ablation procedure on 02/06/2020 with what he describes as 50% relief of his neck pain.       ROS:  He reports easy bruising and back pain.  Balance of review of systems is negative.    Past Medical History:   Diagnosis Date    Amaurosis fugax of right eye 4/2014    resolved    Anticoagulant long-term use     Arm mass, left 6/20/2019    Bilateral venous insufficiency     legs    Calculus of gallbladder without mention of cholecystitis or obstruction     Cardiac arrhythmia     has implanted loop recorder, palpitations. Hx dizziness on bending over    Cellulitis and abscess of leg April 2014    treated at wound care center, resolving    Coronary artery disease     denies chest pain, denies MI, no stents; sees Dr Grant    Estrogen excess 08/2017    Generalized osteoarthrosis, involving multiple sites     Hyperlipidemia     Hypertension     Hypertension 10/7/2015    Hypothyroidism     Knee pain     right far worse than left    Obesity, unspecified     Occlusion and stenosis of carotid artery without mention of cerebral infarction     Primary osteoarthritis of right knee 08/2017    Sleep apnea with use of continuous positive airway pressure (CPAP)     Status post placement of implantable loop recorder     Thyroid disease     Transient arterial occlusion of retina     Transient visual loss     BOTH EYE  PER DR CAMPOVERDE  7/27/2015       Past Surgical History:   Procedure Laterality Date    BACK SURGERY  N/A     decompression of L2 to L5     CARDIAC SURGERY  2013?    insertion loop recorder    CARDIAC SURGERY  2014    transesophageal echo; no clot seen, per patient    CARPAL TUNNEL RELEASE      bilateral    CHOLECYSTECTOMY      COLONOSCOPY  09/26/2016    Dr. Ryley ANDRES Right     HIATAL HERNIA REPAIR  2011    INJECTION OF ANESTHETIC AGENT AROUND MEDIAL BRANCH NERVES INNERVATING CERVICAL FACET JOINT Right 1/17/2020    Procedure: Block-nerve-medial branch-cervical C5, C6;  Surgeon: Kedar Amado MD;  Location: [facility] OR;  Service: Pain Management;  Laterality: Right;    injection SI joint  7/201    JOINT REPLACEMENT      right knee   august 2017    KNEE ARTHROSCOPY Bilateral     right x 2; left x 1    LAPAROSCOPIC GASTRIC BANDING      C      LUMBAR EPIDURAL INJECTION      POLYPECTOMY      vocal cord    RADIOFREQUENCY THERMAL COAGULATION OF MEDIAL BRANCH OF POSTERIOR RAMUS OF CERVICAL SPINAL NERVE Right 2/6/2020    Procedure: RADIOFREQUENCY THERMAL COAGULATION, NERVE, SPINAL, CERVICAL, POSTERIOR RAMUS, MEDIAL BRANCH C5, C6;  Surgeon: Kedar mAado MD;  Location: [facility] OR;  Service: Pain Management;  Laterality: Right;    RHIZOTOMY W/ RADIOFREQUENCY ABLATION  2016    lumbar    ROTATOR CUFF REPAIR      right    SURGICAL REMOVAL OF MASS OF UPPER EXTREMITY Left 6/20/2019    Procedure: EXCISION, MASS, UPPER EXTREMITY;  Surgeon: Max Mandel MD;  Location: [facility] OR;  Service: General;  Laterality: Left;    vocal cord surgery  1975       Social History     Socioeconomic History    Marital status:      Spouse name: Nuria    Number of children: 0    Years of education: Not on file    Highest education level: Not on file   Occupational History    Not on file   Social Needs    Financial resource strain: Not hard at all    Food insecurity     Worry: Never true     Inability: Never true    Transportation needs     Medical: No     Non-medical: No   Tobacco Use    Smoking status:  "Never Smoker    Smokeless tobacco: Never Used   Substance and Sexual Activity    Alcohol use: Yes     Frequency: 4 or more times a week     Drinks per session: 3 or 4     Binge frequency: Monthly     Comment: increased    Drug use: Never    Sexual activity: Yes     Partners: Female     Birth control/protection: None   Lifestyle    Physical activity     Days per week: 0 days     Minutes per session: 0 min    Stress: Rather much   Relationships    Social connections     Talks on phone: Twice a week     Gets together: Once a week     Attends [employer]ars[employer] service: Not on file     Active member of club or organization: Yes     Attends meetings of clubs or organizations: More than 4 times per year     Relationship status:    Other Topics Concern    Not on file   Social History Narrative    Not on file         Medications/Allergies: See med card    Vitals:    08/26/20 1027   BP: 130/67   Pulse: 60   Resp: 18   Weight: 123.4 kg (272 lb)   Height: 5' 11" (1.803 m)   PainSc:   7         Physical exam:  Gen: A and O x3, pleasant, well-groomed  Skin: No rashes or obvious lesions  HEENT: PERRLA, no obvious deformities on ears or in canals.Trachea midline.  CVS: Regular rate and rhythm, normal palpable pulses.  Resp: Clear to auscultation bilaterally, no wheezes or rales.  Abdomen: Soft, NT/ND.  Musculoskeletal: Able to heel walk, toe walk. No antalgic gait.     Neuro:  Upper extremities: 5/5 strength bilaterally   Reflexes: Brachioradialis 1+, Bicep 1+, Tricep 1+.   Sensory: Intact and symmetrical to light touch and pinprick in C2-T1 dermatomes bilaterally.    Cervical Spine:  Cervical spine:  Range of motion is full with flexion with no immediate increased pain, moderately reduced with extension with increased pain at the base of his neck and into the bilateral trapezius.    Spurling's maneuver causes mild increased pain to either side.    Myofascial exam: No Tenderness to palpation across cervical paraspinous " region bilaterally.      Neuro:  Lower extremities: 5/5 strength bilaterally  Reflexes: Patellar 0+, Achilles 0+ bilaterally.  Sensory:  Intact and symmetrical to light touch and pinprick in L2-S1 dermatomes bilaterally.    Lumbar spine:  Lumbar spine:  Range of motion is mildly reduced with flexion with no major increased pain, severely reduced with extension with increased pain especially on oblique extension either side  Wilton's test causes no increased pain on either side.    Supine straight leg raise is negative bilaterally.    Internal and external rotation of the hip causes no increased pain on either side.  Myofascial exam: No tenderness to palpation across lumbar paraspinous muscles.      Imagin19 MRI C-spine:  C2-C3: Small central disc osteophyte complex.  Minimal left facet hypertrophy.  No significant spinal canal or foraminal stenosis.  C3-C4: Mild disc osteophyte complex with possible tiny central soft disc extrusion.  Moderate right and minimal left facet hypertrophy.  Slight ventral cord flattening with thin sliver preserved ventral and preserved dorsal CSF.  Moderate-severe right and mild-moderate left foraminal stenosis.  C4-C5: Mild disc osteophyte complex with prominent central component likely small central disc extrusion..  Mild-moderate bilateral facet hypertrophy.  Ligamentum flavum thickening.  Mild ventral cord flattening with thin sliver preserved ventral and preserved dorsal CSF.  Mild-moderate bilateral foraminal stenosis.  C5-C6: Moderate disc osteophyte complex with likely small central disc extrusion.  Mild bilateral facet hypertrophy.  Ligamentum flavum thickening.  Moderate cord flattening with effacement of ventral and thin sliver preserved dorsal CSF.  Moderate bilateral foraminal stenosis.  C6-C7: Mild disc osteophyte complex with likely small central disc extrusion.  Preserved ventral and dorsal CSF.  Moderate bilateral foraminal stenosis.  C7-T1: Minimal disc  osteophyte complex.  Mild left facet hypertrophy.  No significant spinal canal or foraminal stenosis.    4/15/19 EMG lower ext:   1. Subacute on chronic, moderate to severe, right lumbosacral polyradiculopathy most prominent at the L5 nerve root, with slight active denervation in the L4, L5, and S1 territories.  2. Subacute on chronic, moderate to severe, left lumbosacral polyradiculopathy most prominent of the L5-S1 level, with slight active denervation noted at the L4 level.  3. The symmetric absence of the sural sensory response can be seen in this age group.  The absence of the superficial peroneal sensory response is likely secondary to a more proximal dorsal root ganglion in the setting of L4/5 radiculopathy.There is no convincing evidence of a peripheral neuropathy, focal neuropathy or plexopathy on this study.  In addition, no myopathic changes were noted.    4/2/19 MRI L-spine:  T12-L1: Mild disc bulge.  Minimal bilateral facet hypertrophy.  Mild narrowing of the right greater than left lateral recess.  No significant overall spinal canal stenosis.  Mild right and minimal left foraminal stenosis.    L1-L2: Mild retrolisthesis.  Mild disc bulge and posterior osteophytic ridging.  Likely small superimposed central/right lateral recess protrusion.  Mild-moderate right facet hypertrophy.  Left facets obscured by artifact.  Mild-moderate narrowing of the bilateral lateral recesses.  Mild overall spinal canal stenosis.  Mild-moderate right and mild left foraminal stenosis.   L2-L3: Left hemilaminectomy.  Mild retrolisthesis.  Spinal canal is patent.  Moderate left and mild-moderate right foraminal stenosis.   L3-L4: Fused.  Mild disc bulge.  Partially obscured bilateral facet hypertrophy, likely moderate on the right greater than left.  Minimal narrowing of bilateral lateral recesses.  No significant overall spinal canal stenosis.  Mild right and minimal left foraminal stenosis.   L4-L5: Fused.  Laminectomy.   Spinal canal is widely patent.  Mild-moderate bilateral foraminal stenosis.   BONES: L2-5 posterior fusion with bilateral pedicle screws and posterior rods with L4 laminectomy and crosslink device at the L4 level.  Left L2-3 hemilaminectomy.  Type 1 endplate changes at L4-5.  No aggressive bone marrow signal.   PARASPINAL AREA: STIR signal hyperintensity in the bilateral dorsal paraspinal muscles from the L3-L5 levels which may represent edema or denervation change.  Bilateral dorsal paraspinal muscular atrophy.  No large fluid collection.      Assessment:   The patient is a 65-year-old man with a history of hypertension, CAD, loop recorder, venous insufficiency who presents in referral from Dr. Crawford for neck pain.    1. Lumbar spondylosis     2. Screening procedure  COVID-19 Routine Screening   3. DDD (degenerative disc disease), lumbar     4. Spinal stenosis of lumbar region without neurogenic claudication     5. Cervical spinal stenosis     6. Cervical spondylosis     7. Cervical radiculopathy         Plan:  1.  We reviewed his symptoms, reviewed his lumbar spine imaging.  We discussed that he has significant facet arthropathy at L5/S1 and since he still has movement at that level, I think that this is causing his pain.  We discussed trying medial branch blocks for diagnostic purposes.  I will set him up for a bilateral L4 and L5 medial branch block to see if this provides improvement and if so we can proceed with radiofrequency ablation.  2.  In terms of his neck pain, I still think some of his pain is due to his cervical canal narrowing, we can set up a cervical epidural steroid injection in the future.

## 2020-09-08 ENCOUNTER — LAB VISIT (OUTPATIENT)
Dept: FAMILY MEDICINE | Facility: CLINIC | Age: 65
End: 2020-09-08
Payer: MEDICARE

## 2020-09-08 DIAGNOSIS — Z13.9 SCREENING PROCEDURE: ICD-10-CM

## 2020-09-08 PROCEDURE — U0003 INFECTIOUS AGENT DETECTION BY NUCLEIC ACID (DNA OR RNA); SEVERE ACUTE RESPIRATORY SYNDROME CORONAVIRUS 2 (SARS-COV-2) (CORONAVIRUS DISEASE [COVID-19]), AMPLIFIED PROBE TECHNIQUE, MAKING USE OF HIGH THROUGHPUT TECHNOLOGIES AS DESCRIBED BY CMS-2020-01-R: HCPCS

## 2020-09-09 LAB — SARS-COV-2 RNA RESP QL NAA+PROBE: NOT DETECTED

## 2020-09-11 ENCOUNTER — HOSPITAL ENCOUNTER (OUTPATIENT)
Facility: HOSPITAL | Age: 65
Discharge: HOME | End: 2020-09-11
Attending: ANESTHESIOLOGY | Admitting: ANESTHESIOLOGY
Payer: MEDICARE

## 2020-09-11 ENCOUNTER — HOSPITAL ENCOUNTER (OUTPATIENT)
Dept: RADIOLOGY | Facility: HOSPITAL | Age: 65
Discharge: HOME | End: 2020-09-11
Attending: ANESTHESIOLOGY | Admitting: ANESTHESIOLOGY
Payer: MEDICARE

## 2020-09-11 VITALS
BODY MASS INDEX: 36.12 KG/M2 | OXYGEN SATURATION: 95 % | SYSTOLIC BLOOD PRESSURE: 119 MMHG | HEART RATE: 53 BPM | WEIGHT: 258 LBS | DIASTOLIC BLOOD PRESSURE: 67 MMHG | RESPIRATION RATE: 14 BRPM | HEIGHT: 71 IN | TEMPERATURE: 98 F

## 2020-09-11 DIAGNOSIS — M54.16 LUMBAR RADICULOPATHY: ICD-10-CM

## 2020-09-11 DIAGNOSIS — M47.816 LUMBAR SPONDYLOSIS: Primary | ICD-10-CM

## 2020-09-11 PROCEDURE — 64493 PR INJ DX/THER AGNT PARAVERT FACET JOINT,IMG GUIDE,LUMBAR/SAC,1ST LVL: ICD-10-PCS | Mod: 50,ICN,, | Performed by: ANESTHESIOLOGY

## 2020-09-11 PROCEDURE — 25500020 PHARM REV CODE 255: Mod: PO | Performed by: ANESTHESIOLOGY

## 2020-09-11 PROCEDURE — 99152 MOD SED SAME PHYS/QHP 5/>YRS: CPT | Mod: ICN,,, | Performed by: ANESTHESIOLOGY

## 2020-09-11 PROCEDURE — 64493 INJ PARAVERT F JNT L/S 1 LEV: CPT | Mod: 50,ICN,, | Performed by: ANESTHESIOLOGY

## 2020-09-11 PROCEDURE — 63600175 PHARM REV CODE 636 W HCPCS: Mod: PO | Performed by: ANESTHESIOLOGY

## 2020-09-11 PROCEDURE — 99152 PR MOD CONSCIOUS SEDATION, SAME PHYS, 5+ YRS, FIRST 15 MIN: ICD-10-PCS | Mod: ICN,,, | Performed by: ANESTHESIOLOGY

## 2020-09-11 PROCEDURE — 64493 INJ PARAVERT F JNT L/S 1 LEV: CPT | Mod: 50,PO | Performed by: ANESTHESIOLOGY

## 2020-09-11 PROCEDURE — 64494 PR INJ DX/THER AGNT PARAVERT FACET JOINT,IMG GUIDE,LUMBAR/SAC, 2ND LEVEL: ICD-10-PCS | Mod: 50,ICN,, | Performed by: ANESTHESIOLOGY

## 2020-09-11 PROCEDURE — 64494 INJ PARAVERT F JNT L/S 2 LEV: CPT | Mod: 50,PO | Performed by: ANESTHESIOLOGY

## 2020-09-11 PROCEDURE — 64494 INJ PARAVERT F JNT L/S 2 LEV: CPT | Mod: 50,ICN,, | Performed by: ANESTHESIOLOGY

## 2020-09-11 PROCEDURE — 76000 FLUOROSCOPY <1 HR PHYS/QHP: CPT | Mod: TC,PO

## 2020-09-11 PROCEDURE — 25000003 PHARM REV CODE 250: Mod: PO | Performed by: ANESTHESIOLOGY

## 2020-09-11 RX ORDER — SODIUM CHLORIDE, SODIUM LACTATE, POTASSIUM CHLORIDE, CALCIUM CHLORIDE 600; 310; 30; 20 MG/100ML; MG/100ML; MG/100ML; MG/100ML
INJECTION, SOLUTION INTRAVENOUS CONTINUOUS
Status: DISCONTINUED | OUTPATIENT
Start: 2020-09-11 | End: 2020-09-11 | Stop reason: HOSPADM

## 2020-09-11 RX ORDER — MIDAZOLAM HYDROCHLORIDE 2 MG/2ML
INJECTION, SOLUTION INTRAMUSCULAR; INTRAVENOUS
Status: DISCONTINUED | OUTPATIENT
Start: 2020-09-11 | End: 2020-09-11 | Stop reason: HOSPADM

## 2020-09-11 RX ORDER — BUPIVACAINE HYDROCHLORIDE 2.5 MG/ML
INJECTION, SOLUTION EPIDURAL; INFILTRATION; INTRACAUDAL
Status: DISCONTINUED | OUTPATIENT
Start: 2020-09-11 | End: 2020-09-11 | Stop reason: HOSPADM

## 2020-09-11 RX ORDER — LIDOCAINE HYDROCHLORIDE 10 MG/ML
INJECTION, SOLUTION EPIDURAL; INFILTRATION; INTRACAUDAL; PERINEURAL
Status: DISCONTINUED | OUTPATIENT
Start: 2020-09-11 | End: 2020-09-11 | Stop reason: HOSPADM

## 2020-09-11 RX ADMIN — SODIUM CHLORIDE, SODIUM LACTATE, POTASSIUM CHLORIDE, AND CALCIUM CHLORIDE: .6; .31; .03; .02 INJECTION, SOLUTION INTRAVENOUS at 01:09

## 2020-09-11 NOTE — DISCHARGE SUMMARY
[facility]  Discharge Note  Short Stay    Admit Date: 9/11/2020    Discharge Date: 9/11/2020    Attending Physician: Kedar Amado MD     Discharge Provider: Kedar Amado    Diagnoses:  Active Hospital Problems    Diagnosis  POA    *Lumbar spondylosis [M47.816]  Yes      Resolved Hospital Problems   No resolved problems to display.       Discharged Condition: good    Final Diagnoses: Lumbar spondylosis [M47.816]    Disposition: Home or Self Care    Hospital Course: no complications, uneventful    Outcome of Hospitalization, Treatment, Procedure, or Surgery:  Patient was admitted for outpatient procedure. The patient underwent procedure without complications and are discharged home    Follow up/Patient Instructions:  Follow up as scheduled in Pain Management clinic in 3-4 weeks/Patient has received instructions and follow up date and time    Medications:  Continue previous medications    Discharge Procedure Orders   Call MD for:  temperature >100.4     Call MD for:  severe uncontrolled pain     Call MD for:  redness, tenderness, or signs of infection (pain, swelling, redness, odor or green/yellow discharge around incision site)     Call MD for:  severe persistent headache     No dressing needed         Discharge Procedure Orders (must include Diet, Follow-up, Activity):   Discharge Procedure Orders (must include Diet, Follow-up, Activity)   Call MD for:  temperature >100.4     Call MD for:  severe uncontrolled pain     Call MD for:  redness, tenderness, or signs of infection (pain, swelling, redness, odor or green/yellow discharge around incision site)     Call MD for:  severe persistent headache     No dressing needed

## 2020-09-11 NOTE — INTERVAL H&P NOTE
The patient has been examined and the H&P has been reviewed:    I concur with the findings and no changes have occurred since H&P was written.    Anesthesia/Surgery risks, benefits and alternative options discussed and understood by patient/family.    ASA 3, mallampati 2        Active Hospital Problems    Diagnosis  POA    Lumbar spondylosis [M47.816]  Yes      Resolved Hospital Problems   No resolved problems to display.

## 2020-09-11 NOTE — OP NOTE
PROCEDURE DATE: 9/11/2020    PROCEDURE:  Bilateral L4,5 medial branch nerve block     DIAGNOSIS:  Lumbar spondylosis    Post Op diagnosis: Same    PHYSICIAN: Kedar Amado MD    MEDICATIONS INJECTED: 0.25% bupivicaine, 1ml at each level    LOCAL ANESTHETIC USED: Lidocaine 1%, 2ml at each level    SEDATION MEDICATIONS:2mg versed    ESTIMATED BLOOD LOSS:  none    COMPLICATIONS:  none    TECHNIQUE: A time out was taken to identify the patient, procedure and side of the procedure. The patient was placed in a prone position, then prepped and draped in the usual sterile fashion using ChloraPrep and sterile towels.  The levels were determined under fluoroscopic guidance and then marked.  Local anesthetic was given by raising a wheal at the skin over each site and then infiltrated approximately 2cm deeper.  A 25-gauge 3.5 inch needle was introduced to the anatomic location of the right and then left L4,5 medial branch nerves on the bilateral side.  Appropriate location and medication spread confirmed by injecting 0.5ml of Omnipaque. The above medication was then injected. The patient tolerated the procedure well.     The patient was monitored after the procedure. The patient will be contacted in the next few days to determine extent of relief.  Patient was given post procedure and discharge instructions to follow at home.  The patient was discharged in a stable condition.    Event Time In   In Facility 1230   In Pre-Procedure 1238   Physician Available    Anesthesia Available    Pre-Op: Bedside Procedure Start    Pre-Op: Bedside Procedure Stop    Pre-Procedure Complete 1304   Out of Pre-Procedure    Anesthesia Start    Anesthesia Start Data Collection    Setup Start    Setup Complete    In Room 1321   Prep Start    Procedure Prep Complete    Procedure Start 1326   Procedure Closing    Emergence    Procedure Finish 1333   Sedation Start 1321   Scope In    Extent Reached    Scope Out    Sedation End 1333   Out of Room 1336    Cleanup Start    Cleanup Complete    Cosmetic Start    Cosmetic Stop    Pain Mgmt In Room    Pain Mgmt Out Room    In Recovery    Anesthesia Finish    Bedside Procedure Start    Bedside Procedure Stop    Recovery Care Complete    Out of Recovery    To Phase II    In Phase II    Pain Mgmt Recovery Start    Pain Mgmt Recovery Stop    Obs Rec Start    Obs Rec Stop    Phase II Care Complete    Out of Phase II    Procedural Care Complete    Discharge    Pain Follow Up Needed    Pain Follow Up Complete      Moderate sedation was achieved with midazolam 2mg.  Continuous monitoring of EKG, blood pressure and pulse oximetry was provided by a registered nurse during the entire course of the procedure under my supervision and recorded in the patient's medical record.   Total time for sedation was 12 minutes.

## 2020-09-14 ENCOUNTER — TELEPHONE (OUTPATIENT)
Dept: PAIN MEDICINE | Facility: CLINIC | Age: 65
End: 2020-09-14

## 2020-09-14 DIAGNOSIS — Z13.9 SCREENING PROCEDURE: ICD-10-CM

## 2020-09-14 DIAGNOSIS — M47.816 LUMBAR SPONDYLOSIS: Primary | ICD-10-CM

## 2020-09-14 RX ORDER — SODIUM CHLORIDE, SODIUM LACTATE, POTASSIUM CHLORIDE, CALCIUM CHLORIDE 600; 310; 30; 20 MG/100ML; MG/100ML; MG/100ML; MG/100ML
INJECTION, SOLUTION INTRAVENOUS CONTINUOUS
Status: CANCELLED | OUTPATIENT
Start: 2020-09-23

## 2020-09-14 NOTE — TELEPHONE ENCOUNTER
Spoke with patient regarding the block. He stated he received 60% relief which lasted for 9/23. He stated during this time he was able to go grocery shopping after at [facility] and move around easier. The lumbar RFA has been scheduled for 9/23.

## 2020-09-20 ENCOUNTER — LAB VISIT (OUTPATIENT)
Dept: FAMILY MEDICINE | Facility: CLINIC | Age: 65
End: 2020-09-20
Payer: MEDICARE

## 2020-09-20 DIAGNOSIS — Z13.9 SCREENING PROCEDURE: ICD-10-CM

## 2020-09-20 LAB — SARS-COV-2 RNA RESP QL NAA+PROBE: NOT DETECTED

## 2020-09-20 PROCEDURE — U0003 INFECTIOUS AGENT DETECTION BY NUCLEIC ACID (DNA OR RNA); SEVERE ACUTE RESPIRATORY SYNDROME CORONAVIRUS 2 (SARS-COV-2) (CORONAVIRUS DISEASE [COVID-19]), AMPLIFIED PROBE TECHNIQUE, MAKING USE OF HIGH THROUGHPUT TECHNOLOGIES AS DESCRIBED BY CMS-2020-01-R: HCPCS

## 2020-09-23 ENCOUNTER — HOSPITAL ENCOUNTER (OUTPATIENT)
Facility: HOSPITAL | Age: 65
Discharge: HOME | End: 2020-09-23
Attending: ANESTHESIOLOGY | Admitting: ANESTHESIOLOGY
Payer: MEDICARE

## 2020-09-23 ENCOUNTER — HOSPITAL ENCOUNTER (OUTPATIENT)
Dept: RADIOLOGY | Facility: HOSPITAL | Age: 65
Discharge: HOME | End: 2020-09-23
Attending: ANESTHESIOLOGY | Admitting: ANESTHESIOLOGY
Payer: MEDICARE

## 2020-09-23 DIAGNOSIS — M47.816 LUMBAR SPONDYLOSIS: Primary | ICD-10-CM

## 2020-09-23 DIAGNOSIS — M47.816 LUMBAR SPONDYLOSIS: ICD-10-CM

## 2020-09-23 PROCEDURE — 64635 DESTROY LUMB/SAC FACET JNT: CPT | Mod: 50,,, | Performed by: ANESTHESIOLOGY

## 2020-09-23 PROCEDURE — 64635 PR DESTROY LUMB/SAC FACET JNT: ICD-10-PCS | Mod: 50,,, | Performed by: ANESTHESIOLOGY

## 2020-09-23 PROCEDURE — 25000003 PHARM REV CODE 250: Mod: PO | Performed by: ANESTHESIOLOGY

## 2020-09-23 PROCEDURE — 64636 DESTROY L/S FACET JNT ADDL: CPT | Mod: 50,PO | Performed by: ANESTHESIOLOGY

## 2020-09-23 PROCEDURE — A4216 STERILE WATER/SALINE, 10 ML: HCPCS | Mod: PO | Performed by: ANESTHESIOLOGY

## 2020-09-23 PROCEDURE — 76000 FLUOROSCOPY <1 HR PHYS/QHP: CPT | Mod: TC,PO

## 2020-09-23 PROCEDURE — 63600175 PHARM REV CODE 636 W HCPCS: Mod: PO | Performed by: ANESTHESIOLOGY

## 2020-09-23 PROCEDURE — 64635 DESTROY LUMB/SAC FACET JNT: CPT | Mod: 50,PO | Performed by: ANESTHESIOLOGY

## 2020-09-23 RX ORDER — SODIUM CHLORIDE, SODIUM LACTATE, POTASSIUM CHLORIDE, CALCIUM CHLORIDE 600; 310; 30; 20 MG/100ML; MG/100ML; MG/100ML; MG/100ML
INJECTION, SOLUTION INTRAVENOUS CONTINUOUS
Status: DISCONTINUED | OUTPATIENT
Start: 2020-09-23 | End: 2020-09-23 | Stop reason: HOSPADM

## 2020-09-23 RX ORDER — FENTANYL CITRATE 50 UG/ML
INJECTION, SOLUTION INTRAMUSCULAR; INTRAVENOUS
Status: DISCONTINUED | OUTPATIENT
Start: 2020-09-23 | End: 2020-09-23 | Stop reason: HOSPADM

## 2020-09-23 RX ORDER — MIDAZOLAM HYDROCHLORIDE 2 MG/2ML
INJECTION, SOLUTION INTRAMUSCULAR; INTRAVENOUS
Status: DISCONTINUED | OUTPATIENT
Start: 2020-09-23 | End: 2020-09-23 | Stop reason: HOSPADM

## 2020-09-23 RX ORDER — METHYLPREDNISOLONE ACETATE 40 MG/ML
INJECTION, SUSPENSION INTRA-ARTICULAR; INTRALESIONAL; INTRAMUSCULAR; SOFT TISSUE
Status: DISCONTINUED | OUTPATIENT
Start: 2020-09-23 | End: 2020-09-23 | Stop reason: HOSPADM

## 2020-09-23 RX ORDER — LIDOCAINE HYDROCHLORIDE 20 MG/ML
INJECTION, SOLUTION EPIDURAL; INFILTRATION; INTRACAUDAL; PERINEURAL
Status: DISCONTINUED | OUTPATIENT
Start: 2020-09-23 | End: 2020-09-23 | Stop reason: HOSPADM

## 2020-09-23 RX ORDER — LIDOCAINE HYDROCHLORIDE 10 MG/ML
1 INJECTION, SOLUTION EPIDURAL; INFILTRATION; INTRACAUDAL; PERINEURAL ONCE
Status: COMPLETED | OUTPATIENT
Start: 2020-09-23 | End: 2020-09-23

## 2020-09-23 RX ORDER — LIDOCAINE HYDROCHLORIDE 10 MG/ML
INJECTION, SOLUTION EPIDURAL; INFILTRATION; INTRACAUDAL; PERINEURAL
Status: DISCONTINUED | OUTPATIENT
Start: 2020-09-23 | End: 2020-09-23 | Stop reason: HOSPADM

## 2020-09-23 RX ORDER — SODIUM CHLORIDE 9 MG/ML
INJECTION, SOLUTION INTRAMUSCULAR; INTRAVENOUS; SUBCUTANEOUS
Status: DISCONTINUED | OUTPATIENT
Start: 2020-09-23 | End: 2020-09-23 | Stop reason: HOSPADM

## 2020-09-23 RX ADMIN — SODIUM CHLORIDE, SODIUM LACTATE, POTASSIUM CHLORIDE, AND CALCIUM CHLORIDE: .6; .31; .03; .02 INJECTION, SOLUTION INTRAVENOUS at 01:09

## 2020-09-23 RX ADMIN — LIDOCAINE HYDROCHLORIDE 10 MG: 10 INJECTION, SOLUTION EPIDURAL; INFILTRATION; INTRACAUDAL; PERINEURAL at 01:09

## 2020-09-23 NOTE — DISCHARGE SUMMARY
[facility]  Discharge Note  Short Stay    Admit Date: 9/23/2020    Discharge Date: 9/23/2020    Attending Physician: Kedar Amado MD     Discharge Provider: Kedar Amado    Diagnoses:  Active Hospital Problems    Diagnosis  POA    *Lumbar spondylosis [M47.816]  Yes      Resolved Hospital Problems   No resolved problems to display.       Discharged Condition: good    Final Diagnoses: Lumbar spondylosis [M47.816]    Disposition: Home or Self Care    Hospital Course: no complications, uneventful    Outcome of Hospitalization, Treatment, Procedure, or Surgery:  Patient was admitted for outpatient procedure. The patient underwent procedure without complications and are discharged home    Follow up/Patient Instructions:  Follow up as scheduled in Pain Management clinic in 3-4 weeks/Patient has received instructions and follow up date and time    Medications:  Continue previous medications    Discharge Procedure Orders   Call MD for:  temperature >100.4     Call MD for:  severe uncontrolled pain     Call MD for:  redness, tenderness, or signs of infection (pain, swelling, redness, odor or green/yellow discharge around incision site)     Call MD for:  severe persistent headache     No dressing needed         Discharge Procedure Orders (must include Diet, Follow-up, Activity):   Discharge Procedure Orders (must include Diet, Follow-up, Activity)   Call MD for:  temperature >100.4     Call MD for:  severe uncontrolled pain     Call MD for:  redness, tenderness, or signs of infection (pain, swelling, redness, odor or green/yellow discharge around incision site)     Call MD for:  severe persistent headache     No dressing needed

## 2020-09-24 VITALS
SYSTOLIC BLOOD PRESSURE: 113 MMHG | TEMPERATURE: 98 F | HEIGHT: 71 IN | HEART RATE: 54 BPM | BODY MASS INDEX: 36.4 KG/M2 | DIASTOLIC BLOOD PRESSURE: 61 MMHG | RESPIRATION RATE: 16 BRPM | OXYGEN SATURATION: 98 % | WEIGHT: 260 LBS

## 2020-10-14 ENCOUNTER — OFFICE VISIT (OUTPATIENT)
Dept: PAIN MEDICINE | Facility: CLINIC | Age: 65
End: 2020-10-14
Payer: MEDICARE

## 2020-10-14 VITALS
SYSTOLIC BLOOD PRESSURE: 134 MMHG | WEIGHT: 276.38 LBS | BODY MASS INDEX: 38.54 KG/M2 | DIASTOLIC BLOOD PRESSURE: 63 MMHG | RESPIRATION RATE: 20 BRPM | TEMPERATURE: 98 F | OXYGEN SATURATION: 98 % | HEART RATE: 67 BPM

## 2020-10-14 DIAGNOSIS — M79.18 MYOFASCIAL PAIN: ICD-10-CM

## 2020-10-14 DIAGNOSIS — M48.02 CERVICAL SPINAL STENOSIS: ICD-10-CM

## 2020-10-14 DIAGNOSIS — M48.061 SPINAL STENOSIS OF LUMBAR REGION WITHOUT NEUROGENIC CLAUDICATION: Primary | ICD-10-CM

## 2020-10-14 PROCEDURE — 3075F PR MOST RECENT SYSTOLIC BLOOD PRESS GE 130-139MM HG: ICD-10-PCS | Mod: CPTII,S$GLB,, | Performed by: PHYSICIAN ASSISTANT

## 2020-10-14 PROCEDURE — 1125F PR PAIN SEVERITY QUANTIFIED, PAIN PRESENT: ICD-10-PCS | Mod: S$GLB,,, | Performed by: PHYSICIAN ASSISTANT

## 2020-10-14 PROCEDURE — 99213 PR OFFICE/OUTPT VISIT, EST, LEVL III, 20-29 MIN: ICD-10-PCS | Mod: S$GLB,,, | Performed by: PHYSICIAN ASSISTANT

## 2020-10-14 PROCEDURE — 1101F PT FALLS ASSESS-DOCD LE1/YR: CPT | Mod: CPTII,S$GLB,, | Performed by: PHYSICIAN ASSISTANT

## 2020-10-14 PROCEDURE — 99999 PR PBB SHADOW E&M-EST. PATIENT-LVL V: ICD-10-PCS | Mod: PBBFAC,,, | Performed by: PHYSICIAN ASSISTANT

## 2020-10-14 PROCEDURE — 3075F SYST BP GE 130 - 139MM HG: CPT | Mod: CPTII,S$GLB,, | Performed by: PHYSICIAN ASSISTANT

## 2020-10-14 PROCEDURE — 1125F AMNT PAIN NOTED PAIN PRSNT: CPT | Mod: S$GLB,,, | Performed by: PHYSICIAN ASSISTANT

## 2020-10-14 PROCEDURE — 99999 PR PBB SHADOW E&M-EST. PATIENT-LVL V: CPT | Mod: PBBFAC,,, | Performed by: PHYSICIAN ASSISTANT

## 2020-10-14 PROCEDURE — 1159F PR MEDICATION LIST DOCUMENTED IN MEDICAL RECORD: ICD-10-PCS | Mod: S$GLB,,, | Performed by: PHYSICIAN ASSISTANT

## 2020-10-14 PROCEDURE — 1159F MED LIST DOCD IN RCRD: CPT | Mod: S$GLB,,, | Performed by: PHYSICIAN ASSISTANT

## 2020-10-14 PROCEDURE — 3078F PR MOST RECENT DIASTOLIC BLOOD PRESSURE < 80 MM HG: ICD-10-PCS | Mod: CPTII,S$GLB,, | Performed by: PHYSICIAN ASSISTANT

## 2020-10-14 PROCEDURE — 1101F PR PT FALLS ASSESS DOC 0-1 FALLS W/OUT INJ PAST YR: ICD-10-PCS | Mod: CPTII,S$GLB,, | Performed by: PHYSICIAN ASSISTANT

## 2020-10-14 PROCEDURE — 3078F DIAST BP <80 MM HG: CPT | Mod: CPTII,S$GLB,, | Performed by: PHYSICIAN ASSISTANT

## 2020-10-14 PROCEDURE — 99213 OFFICE O/P EST LOW 20 MIN: CPT | Mod: S$GLB,,, | Performed by: PHYSICIAN ASSISTANT

## 2020-10-14 PROCEDURE — 3008F BODY MASS INDEX DOCD: CPT | Mod: CPTII,S$GLB,, | Performed by: PHYSICIAN ASSISTANT

## 2020-10-14 PROCEDURE — 3008F PR BODY MASS INDEX (BMI) DOCUMENTED: ICD-10-PCS | Mod: CPTII,S$GLB,, | Performed by: PHYSICIAN ASSISTANT

## 2021-02-25 PROBLEM — K76.0 HEPATIC STEATOSIS: Status: ACTIVE | Noted: 2021-02-25

## 2021-03-31 PROBLEM — M25.562 BILATERAL CHRONIC KNEE PAIN: Status: RESOLVED | Noted: 2017-01-10 | Resolved: 2021-03-31

## 2021-03-31 PROBLEM — R53.83 MALAISE AND FATIGUE: Status: RESOLVED | Noted: 2017-05-23 | Resolved: 2021-03-31

## 2021-03-31 PROBLEM — M25.462 EFFUSION, LEFT KNEE: Status: RESOLVED | Noted: 2017-05-23 | Resolved: 2021-03-31

## 2021-03-31 PROBLEM — G89.29 BILATERAL CHRONIC KNEE PAIN: Status: RESOLVED | Noted: 2017-01-10 | Resolved: 2021-03-31

## 2021-03-31 PROBLEM — M25.561 BILATERAL CHRONIC KNEE PAIN: Status: RESOLVED | Noted: 2017-01-10 | Resolved: 2021-03-31

## 2021-03-31 PROBLEM — M47.812 CERVICAL SPONDYLOSIS: Status: RESOLVED | Noted: 2020-01-17 | Resolved: 2021-03-31

## 2021-03-31 PROBLEM — R53.81 MALAISE AND FATIGUE: Status: RESOLVED | Noted: 2017-05-23 | Resolved: 2021-03-31

## 2021-05-21 ENCOUNTER — TELEPHONE (OUTPATIENT)
Dept: VASCULAR SURGERY | Facility: CLINIC | Age: 66
End: 2021-05-21

## 2021-05-24 ENCOUNTER — TELEPHONE (OUTPATIENT)
Dept: VASCULAR SURGERY | Facility: CLINIC | Age: 66
End: 2021-05-24

## 2021-06-09 ENCOUNTER — PATIENT MESSAGE (OUTPATIENT)
Dept: VASCULAR SURGERY | Facility: CLINIC | Age: 66
End: 2021-06-09

## 2021-06-09 ENCOUNTER — TELEPHONE (OUTPATIENT)
Dept: VASCULAR SURGERY | Facility: CLINIC | Age: 66
End: 2021-06-09

## 2021-06-09 DIAGNOSIS — I87.2 VENOUS INSUFFICIENCY: Primary | ICD-10-CM

## 2021-06-10 ENCOUNTER — TELEPHONE (OUTPATIENT)
Dept: VASCULAR SURGERY | Facility: CLINIC | Age: 66
End: 2021-06-10

## 2021-06-25 PROBLEM — D69.6 THROMBOCYTOPENIA: Status: ACTIVE | Noted: 2021-06-25

## 2021-07-02 ENCOUNTER — HOSPITAL ENCOUNTER (OUTPATIENT)
Dept: RADIOLOGY | Facility: OTHER | Age: 66
Discharge: HOME | End: 2021-07-02
Attending: SURGERY
Payer: MEDICARE

## 2021-07-02 ENCOUNTER — OFFICE VISIT (OUTPATIENT)
Dept: VASCULAR SURGERY | Facility: CLINIC | Age: 66
End: 2021-07-02
Payer: MEDICARE

## 2021-07-02 VITALS
WEIGHT: 299 LBS | DIASTOLIC BLOOD PRESSURE: 66 MMHG | BODY MASS INDEX: 41.86 KG/M2 | SYSTOLIC BLOOD PRESSURE: 131 MMHG | HEIGHT: 71 IN | HEART RATE: 57 BPM

## 2021-07-02 DIAGNOSIS — I87.2 VENOUS INSUFFICIENCY: Primary | ICD-10-CM

## 2021-07-02 DIAGNOSIS — E66.01 OBESITY, CLASS III, BMI 40-49.9 (MORBID OBESITY): ICD-10-CM

## 2021-07-02 DIAGNOSIS — I89.0 LYMPHEDEMA OF BOTH LOWER EXTREMITIES: ICD-10-CM

## 2021-07-02 DIAGNOSIS — I87.2 VENOUS INSUFFICIENCY: ICD-10-CM

## 2021-07-02 PROCEDURE — 99204 OFFICE O/P NEW MOD 45 MIN: CPT | Mod: S$GLB,,, | Performed by: SURGERY

## 2021-07-02 PROCEDURE — 1126F PR PAIN SEVERITY QUANTIFIED, NO PAIN PRESENT: ICD-10-PCS | Mod: S$GLB,,, | Performed by: SURGERY

## 2021-07-02 PROCEDURE — 1159F PR MEDICATION LIST DOCUMENTED IN MEDICAL RECORD: ICD-10-PCS | Mod: S$GLB,,, | Performed by: SURGERY

## 2021-07-02 PROCEDURE — 1159F MED LIST DOCD IN RCRD: CPT | Mod: S$GLB,,, | Performed by: SURGERY

## 2021-07-02 PROCEDURE — 3008F PR BODY MASS INDEX (BMI) DOCUMENTED: ICD-10-PCS | Mod: CPTII,S$GLB,, | Performed by: SURGERY

## 2021-07-02 PROCEDURE — 93970 EXTREMITY STUDY: CPT | Mod: 26,,, | Performed by: INTERNAL MEDICINE

## 2021-07-02 PROCEDURE — 99204 PR OFFICE/OUTPT VISIT, NEW, LEVL IV, 45-59 MIN: ICD-10-PCS | Mod: S$GLB,,, | Performed by: SURGERY

## 2021-07-02 PROCEDURE — 93970 EXTREMITY STUDY: CPT | Mod: TC

## 2021-07-02 PROCEDURE — 1126F AMNT PAIN NOTED NONE PRSNT: CPT | Mod: S$GLB,,, | Performed by: SURGERY

## 2021-07-02 PROCEDURE — 93970 US LOWER EXTREMITY VEINS BILATERAL INSUFFICIENCY: ICD-10-PCS | Mod: 26,,, | Performed by: INTERNAL MEDICINE

## 2021-07-02 PROCEDURE — 3008F BODY MASS INDEX DOCD: CPT | Mod: CPTII,S$GLB,, | Performed by: SURGERY

## 2021-07-07 DIAGNOSIS — I89.0 LYMPHEDEMA OF BOTH LOWER EXTREMITIES: Primary | ICD-10-CM

## 2021-08-03 ENCOUNTER — CLINICAL SUPPORT (OUTPATIENT)
Dept: REHABILITATION | Facility: HOSPITAL | Age: 66
End: 2021-08-03
Payer: MEDICARE

## 2021-08-03 DIAGNOSIS — I87.2 VENOUS INSUFFICIENCY OF BOTH LOWER EXTREMITIES: Primary | ICD-10-CM

## 2021-08-03 DIAGNOSIS — I89.0 LYMPHEDEMA OF BOTH LOWER EXTREMITIES: ICD-10-CM

## 2021-08-03 PROCEDURE — 97165 OT EVAL LOW COMPLEX 30 MIN: CPT | Mod: PN

## 2021-08-03 PROCEDURE — 97530 THERAPEUTIC ACTIVITIES: CPT | Mod: PN

## 2021-08-04 PROBLEM — I89.0 LYMPHEDEMA OF BOTH LOWER EXTREMITIES: Status: ACTIVE | Noted: 2021-08-04

## 2021-08-11 ENCOUNTER — CLINICAL SUPPORT (OUTPATIENT)
Dept: REHABILITATION | Facility: HOSPITAL | Age: 66
End: 2021-08-11
Payer: MEDICARE

## 2021-08-11 DIAGNOSIS — I87.2 VENOUS INSUFFICIENCY OF BOTH LOWER EXTREMITIES: ICD-10-CM

## 2021-08-11 DIAGNOSIS — I89.0 LYMPHEDEMA OF BOTH LOWER EXTREMITIES: Primary | ICD-10-CM

## 2021-08-11 PROCEDURE — 97140 MANUAL THERAPY 1/> REGIONS: CPT | Mod: PN

## 2021-08-13 ENCOUNTER — CLINICAL SUPPORT (OUTPATIENT)
Dept: REHABILITATION | Facility: HOSPITAL | Age: 66
End: 2021-08-13
Payer: MEDICARE

## 2021-08-13 DIAGNOSIS — I87.2 VENOUS INSUFFICIENCY OF BOTH LOWER EXTREMITIES: ICD-10-CM

## 2021-08-13 DIAGNOSIS — I89.0 LYMPHEDEMA OF BOTH LOWER EXTREMITIES: Primary | ICD-10-CM

## 2021-08-13 PROCEDURE — 97140 MANUAL THERAPY 1/> REGIONS: CPT | Mod: PN

## 2021-08-16 ENCOUNTER — CLINICAL SUPPORT (OUTPATIENT)
Dept: REHABILITATION | Facility: HOSPITAL | Age: 66
End: 2021-08-16
Payer: MEDICARE

## 2021-08-16 DIAGNOSIS — I89.0 LYMPHEDEMA OF BOTH LOWER EXTREMITIES: Primary | ICD-10-CM

## 2021-08-16 PROCEDURE — 97140 MANUAL THERAPY 1/> REGIONS: CPT | Mod: PN

## 2021-08-18 ENCOUNTER — CLINICAL SUPPORT (OUTPATIENT)
Dept: REHABILITATION | Facility: HOSPITAL | Age: 66
End: 2021-08-18
Payer: MEDICARE

## 2021-08-18 DIAGNOSIS — I89.0 LYMPHEDEMA OF BOTH LOWER EXTREMITIES: Primary | ICD-10-CM

## 2021-08-18 PROCEDURE — 97140 MANUAL THERAPY 1/> REGIONS: CPT | Mod: PN

## 2021-08-20 ENCOUNTER — CLINICAL SUPPORT (OUTPATIENT)
Dept: REHABILITATION | Facility: HOSPITAL | Age: 66
End: 2021-08-20
Payer: MEDICARE

## 2021-08-20 DIAGNOSIS — I87.2 VENOUS INSUFFICIENCY OF BOTH LOWER EXTREMITIES: ICD-10-CM

## 2021-08-20 DIAGNOSIS — I89.0 LYMPHEDEMA OF BOTH LOWER EXTREMITIES: Primary | ICD-10-CM

## 2021-08-20 PROCEDURE — 97140 MANUAL THERAPY 1/> REGIONS: CPT | Mod: PN

## 2021-08-25 ENCOUNTER — CLINICAL SUPPORT (OUTPATIENT)
Dept: REHABILITATION | Facility: HOSPITAL | Age: 66
End: 2021-08-25
Payer: MEDICARE

## 2021-08-25 DIAGNOSIS — I89.0 LYMPHEDEMA OF BOTH LOWER EXTREMITIES: Primary | ICD-10-CM

## 2021-08-25 DIAGNOSIS — I87.2 VENOUS INSUFFICIENCY OF BOTH LOWER EXTREMITIES: ICD-10-CM

## 2021-08-25 PROCEDURE — 97140 MANUAL THERAPY 1/> REGIONS: CPT | Mod: PN

## 2021-10-08 ENCOUNTER — OFFICE VISIT (OUTPATIENT)
Dept: VASCULAR SURGERY | Facility: CLINIC | Age: 66
End: 2021-10-08
Payer: MEDICARE

## 2021-10-08 VITALS
HEART RATE: 65 BPM | SYSTOLIC BLOOD PRESSURE: 117 MMHG | WEIGHT: 298.94 LBS | DIASTOLIC BLOOD PRESSURE: 56 MMHG | BODY MASS INDEX: 41.69 KG/M2

## 2021-10-08 DIAGNOSIS — E66.01 OBESITY, CLASS III, BMI 40-49.9 (MORBID OBESITY): ICD-10-CM

## 2021-10-08 DIAGNOSIS — I89.0 LYMPHEDEMA OF BOTH LOWER EXTREMITIES: Primary | ICD-10-CM

## 2021-10-08 DIAGNOSIS — I87.2 VENOUS INSUFFICIENCY: ICD-10-CM

## 2021-10-08 PROCEDURE — 3078F DIAST BP <80 MM HG: CPT | Mod: CPTII,S$GLB,, | Performed by: SURGERY

## 2021-10-08 PROCEDURE — 3288F FALL RISK ASSESSMENT DOCD: CPT | Mod: CPTII,S$GLB,, | Performed by: SURGERY

## 2021-10-08 PROCEDURE — 4010F ACE/ARB THERAPY RXD/TAKEN: CPT | Mod: CPTII,S$GLB,, | Performed by: SURGERY

## 2021-10-08 PROCEDURE — 1126F AMNT PAIN NOTED NONE PRSNT: CPT | Mod: CPTII,S$GLB,, | Performed by: SURGERY

## 2021-10-08 PROCEDURE — 3008F BODY MASS INDEX DOCD: CPT | Mod: CPTII,S$GLB,, | Performed by: SURGERY

## 2021-10-08 PROCEDURE — 4010F PR ACE/ARB THEARPY RXD/TAKEN: ICD-10-PCS | Mod: CPTII,S$GLB,, | Performed by: SURGERY

## 2021-10-08 PROCEDURE — 3078F PR MOST RECENT DIASTOLIC BLOOD PRESSURE < 80 MM HG: ICD-10-PCS | Mod: CPTII,S$GLB,, | Performed by: SURGERY

## 2021-10-08 PROCEDURE — 3008F PR BODY MASS INDEX (BMI) DOCUMENTED: ICD-10-PCS | Mod: CPTII,S$GLB,, | Performed by: SURGERY

## 2021-10-08 PROCEDURE — 99214 OFFICE O/P EST MOD 30 MIN: CPT | Mod: S$GLB,,, | Performed by: SURGERY

## 2021-10-08 PROCEDURE — 3288F PR FALLS RISK ASSESSMENT DOCUMENTED: ICD-10-PCS | Mod: CPTII,S$GLB,, | Performed by: SURGERY

## 2021-10-08 PROCEDURE — 1126F PR PAIN SEVERITY QUANTIFIED, NO PAIN PRESENT: ICD-10-PCS | Mod: CPTII,S$GLB,, | Performed by: SURGERY

## 2021-10-08 PROCEDURE — 1101F PR PT FALLS ASSESS DOC 0-1 FALLS W/OUT INJ PAST YR: ICD-10-PCS | Mod: CPTII,S$GLB,, | Performed by: SURGERY

## 2021-10-08 PROCEDURE — 99214 PR OFFICE/OUTPT VISIT, EST, LEVL IV, 30-39 MIN: ICD-10-PCS | Mod: S$GLB,,, | Performed by: SURGERY

## 2021-10-08 PROCEDURE — 3074F PR MOST RECENT SYSTOLIC BLOOD PRESSURE < 130 MM HG: ICD-10-PCS | Mod: CPTII,S$GLB,, | Performed by: SURGERY

## 2021-10-08 PROCEDURE — 3074F SYST BP LT 130 MM HG: CPT | Mod: CPTII,S$GLB,, | Performed by: SURGERY

## 2021-10-08 PROCEDURE — 1101F PT FALLS ASSESS-DOCD LE1/YR: CPT | Mod: CPTII,S$GLB,, | Performed by: SURGERY

## 2021-10-19 ENCOUNTER — OFFICE VISIT (OUTPATIENT)
Dept: DERMATOLOGY | Facility: CLINIC | Age: 66
End: 2021-10-19
Payer: MEDICARE

## 2021-10-19 ENCOUNTER — PATIENT MESSAGE (OUTPATIENT)
Dept: FAMILY MEDICINE | Facility: CLINIC | Age: 66
End: 2021-10-19
Payer: MEDICARE

## 2021-10-19 ENCOUNTER — TELEPHONE (OUTPATIENT)
Dept: FAMILY MEDICINE | Facility: CLINIC | Age: 66
End: 2021-10-19

## 2021-10-19 ENCOUNTER — TELEPHONE (OUTPATIENT)
Dept: ORTHOPEDICS | Facility: CLINIC | Age: 66
End: 2021-10-19

## 2021-10-19 VITALS — HEIGHT: 71 IN | RESPIRATION RATE: 18 BRPM | BODY MASS INDEX: 42.84 KG/M2 | WEIGHT: 306 LBS

## 2021-10-19 DIAGNOSIS — D18.01 CHERRY ANGIOMA: ICD-10-CM

## 2021-10-19 DIAGNOSIS — L21.9 SEBORRHEIC DERMATITIS: ICD-10-CM

## 2021-10-19 DIAGNOSIS — L30.9 DERMATITIS: ICD-10-CM

## 2021-10-19 DIAGNOSIS — L81.4 LENTIGO: ICD-10-CM

## 2021-10-19 DIAGNOSIS — Z12.83 SCREENING EXAM FOR SKIN CANCER: Primary | ICD-10-CM

## 2021-10-19 DIAGNOSIS — L82.1 SEBORRHEIC KERATOSIS: ICD-10-CM

## 2021-10-19 PROCEDURE — 4010F ACE/ARB THERAPY RXD/TAKEN: CPT | Mod: CPTII,S$GLB,, | Performed by: DERMATOLOGY

## 2021-10-19 PROCEDURE — 1101F PT FALLS ASSESS-DOCD LE1/YR: CPT | Mod: CPTII,S$GLB,, | Performed by: DERMATOLOGY

## 2021-10-19 PROCEDURE — 1160F RVW MEDS BY RX/DR IN RCRD: CPT | Mod: CPTII,S$GLB,, | Performed by: DERMATOLOGY

## 2021-10-19 PROCEDURE — 99999 PR PBB SHADOW E&M-EST. PATIENT-LVL IV: ICD-10-PCS | Mod: PBBFAC,,, | Performed by: DERMATOLOGY

## 2021-10-19 PROCEDURE — 1159F PR MEDICATION LIST DOCUMENTED IN MEDICAL RECORD: ICD-10-PCS | Mod: CPTII,S$GLB,, | Performed by: DERMATOLOGY

## 2021-10-19 PROCEDURE — 4010F PR ACE/ARB THEARPY RXD/TAKEN: ICD-10-PCS | Mod: CPTII,S$GLB,, | Performed by: DERMATOLOGY

## 2021-10-19 PROCEDURE — 3008F BODY MASS INDEX DOCD: CPT | Mod: CPTII,S$GLB,, | Performed by: DERMATOLOGY

## 2021-10-19 PROCEDURE — 99204 PR OFFICE/OUTPT VISIT, NEW, LEVL IV, 45-59 MIN: ICD-10-PCS | Mod: S$GLB,,, | Performed by: DERMATOLOGY

## 2021-10-19 PROCEDURE — 99999 PR PBB SHADOW E&M-EST. PATIENT-LVL IV: CPT | Mod: PBBFAC,,, | Performed by: DERMATOLOGY

## 2021-10-19 PROCEDURE — 1160F PR REVIEW ALL MEDS BY PRESCRIBER/CLIN PHARMACIST DOCUMENTED: ICD-10-PCS | Mod: CPTII,S$GLB,, | Performed by: DERMATOLOGY

## 2021-10-19 PROCEDURE — 3008F PR BODY MASS INDEX (BMI) DOCUMENTED: ICD-10-PCS | Mod: CPTII,S$GLB,, | Performed by: DERMATOLOGY

## 2021-10-19 PROCEDURE — 1101F PR PT FALLS ASSESS DOC 0-1 FALLS W/OUT INJ PAST YR: ICD-10-PCS | Mod: CPTII,S$GLB,, | Performed by: DERMATOLOGY

## 2021-10-19 PROCEDURE — 3288F PR FALLS RISK ASSESSMENT DOCUMENTED: ICD-10-PCS | Mod: CPTII,S$GLB,, | Performed by: DERMATOLOGY

## 2021-10-19 PROCEDURE — 1159F MED LIST DOCD IN RCRD: CPT | Mod: CPTII,S$GLB,, | Performed by: DERMATOLOGY

## 2021-10-19 PROCEDURE — 99204 OFFICE O/P NEW MOD 45 MIN: CPT | Mod: S$GLB,,, | Performed by: DERMATOLOGY

## 2021-10-19 PROCEDURE — 3288F FALL RISK ASSESSMENT DOCD: CPT | Mod: CPTII,S$GLB,, | Performed by: DERMATOLOGY

## 2021-10-19 RX ORDER — KETOCONAZOLE 20 MG/ML
SHAMPOO, SUSPENSION TOPICAL
Qty: 120 ML | Refills: 5 | Status: SHIPPED | OUTPATIENT
Start: 2021-10-19 | End: 2022-10-26

## 2021-10-28 PROBLEM — K56.609 SBO (SMALL BOWEL OBSTRUCTION): Status: ACTIVE | Noted: 2021-10-28

## 2021-10-29 PROBLEM — E66.01 CLASS 3 SEVERE OBESITY WITH BODY MASS INDEX (BMI) OF 40.0 TO 44.9 IN ADULT: Status: ACTIVE | Noted: 2021-10-29

## 2021-10-29 PROBLEM — E66.813 CLASS 3 SEVERE OBESITY WITH BODY MASS INDEX (BMI) OF 40.0 TO 44.9 IN ADULT: Status: ACTIVE | Noted: 2021-10-29

## 2021-11-03 ENCOUNTER — TELEPHONE (OUTPATIENT)
Dept: GASTROENTEROLOGY | Facility: CLINIC | Age: 66
End: 2021-11-03
Payer: MEDICARE

## 2021-11-11 ENCOUNTER — TELEPHONE (OUTPATIENT)
Dept: VASCULAR SURGERY | Facility: CLINIC | Age: 66
End: 2021-11-11
Payer: MEDICARE

## 2021-11-12 DIAGNOSIS — M25.552 BILATERAL HIP PAIN: Primary | ICD-10-CM

## 2021-11-12 DIAGNOSIS — M25.551 BILATERAL HIP PAIN: Primary | ICD-10-CM

## 2021-11-15 ENCOUNTER — HOSPITAL ENCOUNTER (OUTPATIENT)
Dept: RADIOLOGY | Facility: HOSPITAL | Age: 66
Discharge: HOME | End: 2021-11-15
Attending: ORTHOPAEDIC SURGERY
Payer: MEDICARE

## 2021-11-15 ENCOUNTER — OFFICE VISIT (OUTPATIENT)
Dept: ORTHOPEDICS | Facility: CLINIC | Age: 66
End: 2021-11-15
Payer: MEDICARE

## 2021-11-15 ENCOUNTER — TELEPHONE (OUTPATIENT)
Dept: ORTHOPEDICS | Facility: CLINIC | Age: 66
End: 2021-11-15

## 2021-11-15 VITALS — BODY MASS INDEX: 42 KG/M2 | HEIGHT: 71 IN | WEIGHT: 300 LBS

## 2021-11-15 DIAGNOSIS — M25.552 BILATERAL HIP PAIN: ICD-10-CM

## 2021-11-15 DIAGNOSIS — M25.552 BILATERAL HIP PAIN: Primary | ICD-10-CM

## 2021-11-15 DIAGNOSIS — M54.9 BACK PAIN, UNSPECIFIED BACK LOCATION, UNSPECIFIED BACK PAIN LATERALITY, UNSPECIFIED CHRONICITY: Primary | ICD-10-CM

## 2021-11-15 DIAGNOSIS — M25.551 BILATERAL HIP PAIN: ICD-10-CM

## 2021-11-15 DIAGNOSIS — M25.551 BILATERAL HIP PAIN: Primary | ICD-10-CM

## 2021-11-15 PROCEDURE — 3008F PR BODY MASS INDEX (BMI) DOCUMENTED: ICD-10-PCS | Mod: CPTII,S$GLB,, | Performed by: ORTHOPAEDIC SURGERY

## 2021-11-15 PROCEDURE — 3288F PR FALLS RISK ASSESSMENT DOCUMENTED: ICD-10-PCS | Mod: CPTII,S$GLB,, | Performed by: ORTHOPAEDIC SURGERY

## 2021-11-15 PROCEDURE — 1160F PR REVIEW ALL MEDS BY PRESCRIBER/CLIN PHARMACIST DOCUMENTED: ICD-10-PCS | Mod: CPTII,S$GLB,, | Performed by: ORTHOPAEDIC SURGERY

## 2021-11-15 PROCEDURE — 73521 X-RAY EXAM HIPS BI 2 VIEWS: CPT | Mod: 26,,, | Performed by: RADIOLOGY

## 2021-11-15 PROCEDURE — 1101F PR PT FALLS ASSESS DOC 0-1 FALLS W/OUT INJ PAST YR: ICD-10-PCS | Mod: CPTII,S$GLB,, | Performed by: ORTHOPAEDIC SURGERY

## 2021-11-15 PROCEDURE — 99999 PR PBB SHADOW E&M-EST. PATIENT-LVL III: CPT | Mod: PBBFAC,,, | Performed by: ORTHOPAEDIC SURGERY

## 2021-11-15 PROCEDURE — 3288F FALL RISK ASSESSMENT DOCD: CPT | Mod: CPTII,S$GLB,, | Performed by: ORTHOPAEDIC SURGERY

## 2021-11-15 PROCEDURE — 1159F PR MEDICATION LIST DOCUMENTED IN MEDICAL RECORD: ICD-10-PCS | Mod: CPTII,S$GLB,, | Performed by: ORTHOPAEDIC SURGERY

## 2021-11-15 PROCEDURE — 1125F AMNT PAIN NOTED PAIN PRSNT: CPT | Mod: CPTII,S$GLB,, | Performed by: ORTHOPAEDIC SURGERY

## 2021-11-15 PROCEDURE — 1159F MED LIST DOCD IN RCRD: CPT | Mod: CPTII,S$GLB,, | Performed by: ORTHOPAEDIC SURGERY

## 2021-11-15 PROCEDURE — 1111F PR DISCHARGE MEDS RECONCILED W/ CURRENT OUTPATIENT MED LIST: ICD-10-PCS | Mod: CPTII,S$GLB,, | Performed by: ORTHOPAEDIC SURGERY

## 2021-11-15 PROCEDURE — 99213 PR OFFICE/OUTPT VISIT, EST, LEVL III, 20-29 MIN: ICD-10-PCS | Mod: S$GLB,,, | Performed by: ORTHOPAEDIC SURGERY

## 2021-11-15 PROCEDURE — 4010F PR ACE/ARB THEARPY RXD/TAKEN: ICD-10-PCS | Mod: CPTII,S$GLB,, | Performed by: ORTHOPAEDIC SURGERY

## 2021-11-15 PROCEDURE — 73521 X-RAY EXAM HIPS BI 2 VIEWS: CPT | Mod: TC,PO

## 2021-11-15 PROCEDURE — 73521 XR HIPS BILATERAL 2 VIEW INCL AP PELVIS: ICD-10-PCS | Mod: 26,,, | Performed by: RADIOLOGY

## 2021-11-15 PROCEDURE — 1125F PR PAIN SEVERITY QUANTIFIED, PAIN PRESENT: ICD-10-PCS | Mod: CPTII,S$GLB,, | Performed by: ORTHOPAEDIC SURGERY

## 2021-11-15 PROCEDURE — 1111F DSCHRG MED/CURRENT MED MERGE: CPT | Mod: CPTII,S$GLB,, | Performed by: ORTHOPAEDIC SURGERY

## 2021-11-15 PROCEDURE — 3008F BODY MASS INDEX DOCD: CPT | Mod: CPTII,S$GLB,, | Performed by: ORTHOPAEDIC SURGERY

## 2021-11-15 PROCEDURE — 99213 OFFICE O/P EST LOW 20 MIN: CPT | Mod: S$GLB,,, | Performed by: ORTHOPAEDIC SURGERY

## 2021-11-15 PROCEDURE — 1101F PT FALLS ASSESS-DOCD LE1/YR: CPT | Mod: CPTII,S$GLB,, | Performed by: ORTHOPAEDIC SURGERY

## 2021-11-15 PROCEDURE — 1160F RVW MEDS BY RX/DR IN RCRD: CPT | Mod: CPTII,S$GLB,, | Performed by: ORTHOPAEDIC SURGERY

## 2021-11-15 PROCEDURE — 99999 PR PBB SHADOW E&M-EST. PATIENT-LVL III: ICD-10-PCS | Mod: PBBFAC,,, | Performed by: ORTHOPAEDIC SURGERY

## 2021-11-15 PROCEDURE — 4010F ACE/ARB THERAPY RXD/TAKEN: CPT | Mod: CPTII,S$GLB,, | Performed by: ORTHOPAEDIC SURGERY

## 2021-11-16 ENCOUNTER — PATIENT MESSAGE (OUTPATIENT)
Dept: ORTHOPEDICS | Facility: CLINIC | Age: 66
End: 2021-11-16
Payer: MEDICARE

## 2021-11-16 DIAGNOSIS — M25.551 BILATERAL HIP PAIN: ICD-10-CM

## 2021-11-16 DIAGNOSIS — M25.552 BILATERAL HIP PAIN: ICD-10-CM

## 2021-11-16 DIAGNOSIS — M54.9 BACK PAIN, UNSPECIFIED BACK LOCATION, UNSPECIFIED BACK PAIN LATERALITY, UNSPECIFIED CHRONICITY: Primary | ICD-10-CM

## 2021-11-23 ENCOUNTER — OFFICE VISIT (OUTPATIENT)
Dept: OTOLARYNGOLOGY | Facility: CLINIC | Age: 66
End: 2021-11-23
Payer: MEDICARE

## 2021-11-23 ENCOUNTER — CLINICAL SUPPORT (OUTPATIENT)
Dept: AUDIOLOGY | Facility: CLINIC | Age: 66
End: 2021-11-23
Payer: MEDICARE

## 2021-11-23 VITALS — HEIGHT: 71 IN | WEIGHT: 300.06 LBS | BODY MASS INDEX: 42.01 KG/M2

## 2021-11-23 DIAGNOSIS — H90.3 ASYMMETRIC SNHL (SENSORINEURAL HEARING LOSS): Primary | ICD-10-CM

## 2021-11-23 DIAGNOSIS — H61.23 BILATERAL IMPACTED CERUMEN: ICD-10-CM

## 2021-11-23 DIAGNOSIS — H90.3 ASYMMETRICAL SENSORINEURAL HEARING LOSS: ICD-10-CM

## 2021-11-23 DIAGNOSIS — H90.3 BILATERAL HIGH FREQUENCY SENSORINEURAL HEARING LOSS: Primary | ICD-10-CM

## 2021-11-23 DIAGNOSIS — Z77.122 HISTORY OF EXPOSURE TO NOISE: ICD-10-CM

## 2021-11-23 DIAGNOSIS — H91.90 HEARING LOSS, UNSPECIFIED HEARING LOSS TYPE, UNSPECIFIED LATERALITY: ICD-10-CM

## 2021-11-23 PROCEDURE — 99999 PR PBB SHADOW E&M-EST. PATIENT-LVL II: ICD-10-PCS | Mod: PBBFAC,,,

## 2021-11-23 PROCEDURE — 92557 PR COMPREHENSIVE HEARING TEST: ICD-10-PCS | Mod: S$GLB,,, | Performed by: AUDIOLOGIST

## 2021-11-23 PROCEDURE — 99999 PR PBB SHADOW E&M-EST. PATIENT-LVL IV: CPT | Mod: PBBFAC,,, | Performed by: NURSE PRACTITIONER

## 2021-11-23 PROCEDURE — 4010F ACE/ARB THERAPY RXD/TAKEN: CPT | Mod: CPTII,S$GLB,, | Performed by: NURSE PRACTITIONER

## 2021-11-23 PROCEDURE — 99203 OFFICE O/P NEW LOW 30 MIN: CPT | Mod: 25,S$GLB,, | Performed by: NURSE PRACTITIONER

## 2021-11-23 PROCEDURE — 92567 TYMPANOMETRY: CPT | Mod: S$GLB,,, | Performed by: AUDIOLOGIST

## 2021-11-23 PROCEDURE — G0268 REMOVAL OF IMPACTED WAX MD: HCPCS | Mod: S$GLB,,, | Performed by: NURSE PRACTITIONER

## 2021-11-23 PROCEDURE — 99999 PR PBB SHADOW E&M-EST. PATIENT-LVL IV: ICD-10-PCS | Mod: PBBFAC,,, | Performed by: NURSE PRACTITIONER

## 2021-11-23 PROCEDURE — 92567 PR TYMPA2METRY: ICD-10-PCS | Mod: S$GLB,,, | Performed by: AUDIOLOGIST

## 2021-11-23 PROCEDURE — G0268 PR REMOVAL OF IMPACTED WAX MD: ICD-10-PCS | Mod: S$GLB,,, | Performed by: NURSE PRACTITIONER

## 2021-11-23 PROCEDURE — 99203 PR OFFICE/OUTPT VISIT, NEW, LEVL III, 30-44 MIN: ICD-10-PCS | Mod: 25,S$GLB,, | Performed by: NURSE PRACTITIONER

## 2021-11-23 PROCEDURE — 99999 PR PBB SHADOW E&M-EST. PATIENT-LVL II: CPT | Mod: PBBFAC,,,

## 2021-11-23 PROCEDURE — 92557 COMPREHENSIVE HEARING TEST: CPT | Mod: S$GLB,,, | Performed by: AUDIOLOGIST

## 2021-11-23 PROCEDURE — 4010F PR ACE/ARB THEARPY RXD/TAKEN: ICD-10-PCS | Mod: CPTII,S$GLB,, | Performed by: NURSE PRACTITIONER

## 2021-11-23 RX ORDER — SUCRALFATE 1 G/1
TABLET ORAL
COMMUNITY
Start: 2021-11-12 | End: 2022-04-25

## 2021-12-27 PROBLEM — Z79.899 TAKING A STATIN MEDICATION: Status: RESOLVED | Noted: 2019-05-02 | Resolved: 2021-12-27

## 2021-12-29 ENCOUNTER — OFFICE VISIT (OUTPATIENT)
Dept: CARDIOLOGY | Facility: CLINIC | Age: 66
End: 2021-12-29
Payer: MEDICARE

## 2021-12-29 VITALS
HEART RATE: 68 BPM | SYSTOLIC BLOOD PRESSURE: 150 MMHG | OXYGEN SATURATION: 98 % | WEIGHT: 312.63 LBS | DIASTOLIC BLOOD PRESSURE: 78 MMHG | HEIGHT: 71 IN | BODY MASS INDEX: 43.77 KG/M2

## 2021-12-29 DIAGNOSIS — R07.89 ATYPICAL CHEST PAIN: ICD-10-CM

## 2021-12-29 DIAGNOSIS — I10 PRIMARY HYPERTENSION: ICD-10-CM

## 2021-12-29 DIAGNOSIS — E78.2 MIXED HYPERLIPIDEMIA: ICD-10-CM

## 2021-12-29 DIAGNOSIS — R06.02 SHORTNESS OF BREATH: ICD-10-CM

## 2021-12-29 DIAGNOSIS — I25.10 CORONARY ARTERY DISEASE INVOLVING NATIVE CORONARY ARTERY OF NATIVE HEART WITHOUT ANGINA PECTORIS: Primary | ICD-10-CM

## 2021-12-29 DIAGNOSIS — I10 PRIMARY HYPERTENSION: Primary | ICD-10-CM

## 2021-12-29 DIAGNOSIS — I25.10 CORONARY ARTERY DISEASE INVOLVING NATIVE CORONARY ARTERY OF NATIVE HEART WITHOUT ANGINA PECTORIS: ICD-10-CM

## 2021-12-29 PROCEDURE — 1159F MED LIST DOCD IN RCRD: CPT | Mod: CPTII,S$GLB,, | Performed by: INTERNAL MEDICINE

## 2021-12-29 PROCEDURE — 3078F DIAST BP <80 MM HG: CPT | Mod: CPTII,S$GLB,, | Performed by: INTERNAL MEDICINE

## 2021-12-29 PROCEDURE — 4010F PR ACE/ARB THEARPY RXD/TAKEN: ICD-10-PCS | Mod: CPTII,S$GLB,, | Performed by: INTERNAL MEDICINE

## 2021-12-29 PROCEDURE — 3077F PR MOST RECENT SYSTOLIC BLOOD PRESSURE >= 140 MM HG: ICD-10-PCS | Mod: CPTII,S$GLB,, | Performed by: INTERNAL MEDICINE

## 2021-12-29 PROCEDURE — 1160F RVW MEDS BY RX/DR IN RCRD: CPT | Mod: CPTII,S$GLB,, | Performed by: INTERNAL MEDICINE

## 2021-12-29 PROCEDURE — 99999 PR PBB SHADOW E&M-EST. PATIENT-LVL V: CPT | Mod: PBBFAC,,, | Performed by: INTERNAL MEDICINE

## 2021-12-29 PROCEDURE — 3008F BODY MASS INDEX DOCD: CPT | Mod: CPTII,S$GLB,, | Performed by: INTERNAL MEDICINE

## 2021-12-29 PROCEDURE — 3078F PR MOST RECENT DIASTOLIC BLOOD PRESSURE < 80 MM HG: ICD-10-PCS | Mod: CPTII,S$GLB,, | Performed by: INTERNAL MEDICINE

## 2021-12-29 PROCEDURE — 3288F PR FALLS RISK ASSESSMENT DOCUMENTED: ICD-10-PCS | Mod: CPTII,S$GLB,, | Performed by: INTERNAL MEDICINE

## 2021-12-29 PROCEDURE — 1101F PR PT FALLS ASSESS DOC 0-1 FALLS W/OUT INJ PAST YR: ICD-10-PCS | Mod: CPTII,S$GLB,, | Performed by: INTERNAL MEDICINE

## 2021-12-29 PROCEDURE — 4010F ACE/ARB THERAPY RXD/TAKEN: CPT | Mod: CPTII,S$GLB,, | Performed by: INTERNAL MEDICINE

## 2021-12-29 PROCEDURE — 93010 EKG 12-LEAD: ICD-10-PCS | Mod: S$GLB,,, | Performed by: INTERNAL MEDICINE

## 2021-12-29 PROCEDURE — 1126F AMNT PAIN NOTED NONE PRSNT: CPT | Mod: CPTII,S$GLB,, | Performed by: INTERNAL MEDICINE

## 2021-12-29 PROCEDURE — 1101F PT FALLS ASSESS-DOCD LE1/YR: CPT | Mod: CPTII,S$GLB,, | Performed by: INTERNAL MEDICINE

## 2021-12-29 PROCEDURE — 1160F PR REVIEW ALL MEDS BY PRESCRIBER/CLIN PHARMACIST DOCUMENTED: ICD-10-PCS | Mod: CPTII,S$GLB,, | Performed by: INTERNAL MEDICINE

## 2021-12-29 PROCEDURE — 1126F PR PAIN SEVERITY QUANTIFIED, NO PAIN PRESENT: ICD-10-PCS | Mod: CPTII,S$GLB,, | Performed by: INTERNAL MEDICINE

## 2021-12-29 PROCEDURE — 3288F FALL RISK ASSESSMENT DOCD: CPT | Mod: CPTII,S$GLB,, | Performed by: INTERNAL MEDICINE

## 2021-12-29 PROCEDURE — 1159F PR MEDICATION LIST DOCUMENTED IN MEDICAL RECORD: ICD-10-PCS | Mod: CPTII,S$GLB,, | Performed by: INTERNAL MEDICINE

## 2021-12-29 PROCEDURE — 93005 ELECTROCARDIOGRAM TRACING: CPT | Mod: PO

## 2021-12-29 PROCEDURE — 3008F PR BODY MASS INDEX (BMI) DOCUMENTED: ICD-10-PCS | Mod: CPTII,S$GLB,, | Performed by: INTERNAL MEDICINE

## 2021-12-29 PROCEDURE — 99204 OFFICE O/P NEW MOD 45 MIN: CPT | Mod: S$GLB,,, | Performed by: INTERNAL MEDICINE

## 2021-12-29 PROCEDURE — 3077F SYST BP >= 140 MM HG: CPT | Mod: CPTII,S$GLB,, | Performed by: INTERNAL MEDICINE

## 2021-12-29 PROCEDURE — 93010 ELECTROCARDIOGRAM REPORT: CPT | Mod: S$GLB,,, | Performed by: INTERNAL MEDICINE

## 2021-12-29 PROCEDURE — 99204 PR OFFICE/OUTPT VISIT, NEW, LEVL IV, 45-59 MIN: ICD-10-PCS | Mod: S$GLB,,, | Performed by: INTERNAL MEDICINE

## 2021-12-29 PROCEDURE — 99999 PR PBB SHADOW E&M-EST. PATIENT-LVL V: ICD-10-PCS | Mod: PBBFAC,,, | Performed by: INTERNAL MEDICINE

## 2022-01-10 ENCOUNTER — HOSPITAL ENCOUNTER (OUTPATIENT)
Dept: RADIOLOGY | Facility: HOSPITAL | Age: 67
Discharge: HOME | End: 2022-01-10
Attending: INTERNAL MEDICINE
Payer: MEDICARE

## 2022-01-10 ENCOUNTER — CLINICAL SUPPORT (OUTPATIENT)
Dept: CARDIOLOGY | Facility: HOSPITAL | Age: 67
End: 2022-01-10
Attending: INTERNAL MEDICINE
Payer: MEDICARE

## 2022-01-10 VITALS — HEIGHT: 71 IN | WEIGHT: 312 LBS | BODY MASS INDEX: 43.68 KG/M2

## 2022-01-10 DIAGNOSIS — R06.02 SHORTNESS OF BREATH: ICD-10-CM

## 2022-01-10 DIAGNOSIS — R07.89 ATYPICAL CHEST PAIN: ICD-10-CM

## 2022-01-10 PROCEDURE — 93306 TTE W/DOPPLER COMPLETE: CPT | Mod: 26,,, | Performed by: INTERNAL MEDICINE

## 2022-01-10 PROCEDURE — 93016 CV STRESS TEST SUPVJ ONLY: CPT | Mod: ,,, | Performed by: INTERNAL MEDICINE

## 2022-01-10 PROCEDURE — 93016 STRESS TEST WITH MYOCARDIAL PERFUSION (CUPID ONLY): ICD-10-PCS | Mod: ,,, | Performed by: INTERNAL MEDICINE

## 2022-01-10 PROCEDURE — A9502 TC99M TETROFOSMIN: HCPCS | Mod: PO

## 2022-01-10 PROCEDURE — 93306 ECHO (CUPID ONLY): ICD-10-PCS | Mod: 26,,, | Performed by: INTERNAL MEDICINE

## 2022-01-10 PROCEDURE — 63600175 PHARM REV CODE 636 W HCPCS: Mod: PO | Performed by: INTERNAL MEDICINE

## 2022-01-10 PROCEDURE — 93017 CV STRESS TEST TRACING ONLY: CPT | Mod: PO

## 2022-01-10 PROCEDURE — 78452 STRESS TEST WITH MYOCARDIAL PERFUSION (CUPID ONLY): ICD-10-PCS | Mod: 26,,, | Performed by: INTERNAL MEDICINE

## 2022-01-10 PROCEDURE — 93018 CV STRESS TEST I&R ONLY: CPT | Mod: ,,, | Performed by: INTERNAL MEDICINE

## 2022-01-10 PROCEDURE — 78452 HT MUSCLE IMAGE SPECT MULT: CPT | Mod: 26,,, | Performed by: INTERNAL MEDICINE

## 2022-01-10 PROCEDURE — 93306 TTE W/DOPPLER COMPLETE: CPT | Mod: PO

## 2022-01-10 PROCEDURE — 93018 PR CARDIAC STRESS TST,INTERP/REPT ONLY: ICD-10-PCS | Mod: ,,, | Performed by: INTERNAL MEDICINE

## 2022-01-10 RX ORDER — REGADENOSON 0.08 MG/ML
0.4 INJECTION, SOLUTION INTRAVENOUS ONCE
Status: COMPLETED | OUTPATIENT
Start: 2022-01-10 | End: 2022-01-10

## 2022-01-10 RX ADMIN — REGADENOSON 0.4 MG: 0.08 INJECTION, SOLUTION INTRAVENOUS at 02:01

## 2022-01-11 LAB
ASCENDING AORTA: 3.24 CM
AV INDEX (PROSTH): 0.81
AV MEAN GRADIENT: 4 MMHG
AV PEAK GRADIENT: 8 MMHG
AV VALVE AREA: 3.48 CM2
AV VELOCITY RATIO: 0.83
BSA FOR ECHO PROCEDURE: 2.66 M2
CV ECHO LV RWT: 0.4 CM
CV PHARM DOSE: 0.4 MG
CV STRESS BASE HR: 56 BPM
DIASTOLIC BLOOD PRESSURE: 60 MMHG
DOP CALC AO PEAK VEL: 1.43 M/S
DOP CALC AO VTI: 33.75 CM
DOP CALC LVOT AREA: 4.3 CM2
DOP CALC LVOT DIAMETER: 2.34 CM
DOP CALC LVOT PEAK VEL: 1.18 M/S
DOP CALC LVOT STROKE VOLUME: 117.43 CM3
DOP CALCLVOT PEAK VEL VTI: 27.32 CM
E WAVE DECELERATION TIME: 142.83 MSEC
E/A RATIO: 1.22
E/E' RATIO: 9.11 M/S
ECHO LV POSTERIOR WALL: 1.06 CM (ref 0.6–1.1)
EJECTION FRACTION: 60 %
FRACTIONAL SHORTENING: 26 % (ref 28–44)
INTERVENTRICULAR SEPTUM: 1.05 CM (ref 0.6–1.1)
LA MAJOR: 4.54 CM
LA MINOR: 5.19 CM
LA WIDTH: 4.41 CM
LEFT ATRIUM SIZE: 4.11 CM
LEFT ATRIUM VOLUME INDEX: 29.3 ML/M2
LEFT ATRIUM VOLUME: 74.62 CM3
LEFT INTERNAL DIMENSION IN SYSTOLE: 3.98 CM (ref 2.1–4)
LEFT VENTRICLE DIASTOLIC VOLUME INDEX: 54.16 ML/M2
LEFT VENTRICLE DIASTOLIC VOLUME: 138.12 ML
LEFT VENTRICLE MASS INDEX: 86 G/M2
LEFT VENTRICLE SYSTOLIC VOLUME INDEX: 27.1 ML/M2
LEFT VENTRICLE SYSTOLIC VOLUME: 69.19 ML
LEFT VENTRICULAR INTERNAL DIMENSION IN DIASTOLE: 5.35 CM (ref 3.5–6)
LEFT VENTRICULAR MASS: 218.61 G
LV LATERAL E/E' RATIO: 9.11 M/S
LV SEPTAL E/E' RATIO: 9.11 M/S
MV A" WAVE DURATION": 6.57 MSEC
MV PEAK A VEL: 0.67 M/S
MV PEAK E VEL: 0.82 M/S
MV STENOSIS PRESSURE HALF TIME: 41.42 MS
MV VALVE AREA P 1/2 METHOD: 5.31 CM2
NUC REST EJECTION FRACTION: 58
OHS CV CPX 1 MINUTE RECOVERY HEART RATE: 74 BPM
OHS CV CPX 85 PERCENT MAX PREDICTED HEART RATE MALE: 130
OHS CV CPX MAX PREDICTED HEART RATE: 153
OHS CV CPX PATIENT IS FEMALE: 0
OHS CV CPX PATIENT IS MALE: 1
OHS CV CPX PEAK DIASTOLIC BLOOD PRESSURE: 68 MMHG
OHS CV CPX PEAK HEAR RATE: 75 BPM
OHS CV CPX PEAK RATE PRESSURE PRODUCT: 9225
OHS CV CPX PEAK SYSTOLIC BLOOD PRESSURE: 123 MMHG
OHS CV CPX PERCENT MAX PREDICTED HEART RATE ACHIEVED: 49
OHS CV CPX RATE PRESSURE PRODUCT PRESENTING: 6440
OHS CV PHARM TIME: 1433 MIN
PISA TR MAX VEL: 2.65 M/S
PULM VEIN S/D RATIO: 1.22
PV PEAK D VEL: 0.41 M/S
PV PEAK S VEL: 0.5 M/S
RA MAJOR: 4.43 CM
RA PRESSURE: 3 MMHG
RA WIDTH: 3.67 CM
RIGHT VENTRICULAR END-DIASTOLIC DIMENSION: 4.01 CM
RV TISSUE DOPPLER FREE WALL SYSTOLIC VELOCITY 1 (APICAL 4 CHAMBER VIEW): 13.57 CM/S
SINUS: 2.97 CM
STJ: 3.12 CM
SYSTOLIC BLOOD PRESSURE: 115 MMHG
TDI LATERAL: 0.09 M/S
TDI SEPTAL: 0.09 M/S
TDI: 0.09 M/S
TR MAX PG: 28 MMHG
TRICUSPID ANNULAR PLANE SYSTOLIC EXCURSION: 2.39 CM
TV REST PULMONARY ARTERY PRESSURE: 31 MMHG

## 2022-05-09 ENCOUNTER — OFFICE VISIT (OUTPATIENT)
Dept: URGENT CARE | Facility: CLINIC | Age: 67
End: 2022-05-09
Payer: MEDICARE

## 2022-05-09 VITALS
DIASTOLIC BLOOD PRESSURE: 84 MMHG | TEMPERATURE: 98 F | SYSTOLIC BLOOD PRESSURE: 142 MMHG | OXYGEN SATURATION: 96 % | RESPIRATION RATE: 16 BRPM | BODY MASS INDEX: 43.92 KG/M2 | HEIGHT: 71 IN | HEART RATE: 60 BPM | WEIGHT: 313.69 LBS

## 2022-05-09 DIAGNOSIS — L03.115 CELLULITIS OF RIGHT LEG: Primary | ICD-10-CM

## 2022-05-09 PROCEDURE — 3044F HG A1C LEVEL LT 7.0%: CPT | Mod: CPTII,S$GLB,, | Performed by: PHYSICIAN ASSISTANT

## 2022-05-09 PROCEDURE — 3077F PR MOST RECENT SYSTOLIC BLOOD PRESSURE >= 140 MM HG: ICD-10-PCS | Mod: CPTII,S$GLB,, | Performed by: PHYSICIAN ASSISTANT

## 2022-05-09 PROCEDURE — 1159F PR MEDICATION LIST DOCUMENTED IN MEDICAL RECORD: ICD-10-PCS | Mod: CPTII,S$GLB,, | Performed by: PHYSICIAN ASSISTANT

## 2022-05-09 PROCEDURE — 4010F ACE/ARB THERAPY RXD/TAKEN: CPT | Mod: CPTII,S$GLB,, | Performed by: PHYSICIAN ASSISTANT

## 2022-05-09 PROCEDURE — 99213 OFFICE O/P EST LOW 20 MIN: CPT | Mod: S$GLB,,, | Performed by: PHYSICIAN ASSISTANT

## 2022-05-09 PROCEDURE — 3044F PR MOST RECENT HEMOGLOBIN A1C LEVEL <7.0%: ICD-10-PCS | Mod: CPTII,S$GLB,, | Performed by: PHYSICIAN ASSISTANT

## 2022-05-09 PROCEDURE — 3008F PR BODY MASS INDEX (BMI) DOCUMENTED: ICD-10-PCS | Mod: CPTII,S$GLB,, | Performed by: PHYSICIAN ASSISTANT

## 2022-05-09 PROCEDURE — 4010F PR ACE/ARB THEARPY RXD/TAKEN: ICD-10-PCS | Mod: CPTII,S$GLB,, | Performed by: PHYSICIAN ASSISTANT

## 2022-05-09 PROCEDURE — 1160F PR REVIEW ALL MEDS BY PRESCRIBER/CLIN PHARMACIST DOCUMENTED: ICD-10-PCS | Mod: CPTII,S$GLB,, | Performed by: PHYSICIAN ASSISTANT

## 2022-05-09 PROCEDURE — 1160F RVW MEDS BY RX/DR IN RCRD: CPT | Mod: CPTII,S$GLB,, | Performed by: PHYSICIAN ASSISTANT

## 2022-05-09 PROCEDURE — 99213 PR OFFICE/OUTPT VISIT, EST, LEVL III, 20-29 MIN: ICD-10-PCS | Mod: S$GLB,,, | Performed by: PHYSICIAN ASSISTANT

## 2022-05-09 PROCEDURE — 1159F MED LIST DOCD IN RCRD: CPT | Mod: CPTII,S$GLB,, | Performed by: PHYSICIAN ASSISTANT

## 2022-05-09 PROCEDURE — 3079F DIAST BP 80-89 MM HG: CPT | Mod: CPTII,S$GLB,, | Performed by: PHYSICIAN ASSISTANT

## 2022-05-09 PROCEDURE — 3079F PR MOST RECENT DIASTOLIC BLOOD PRESSURE 80-89 MM HG: ICD-10-PCS | Mod: CPTII,S$GLB,, | Performed by: PHYSICIAN ASSISTANT

## 2022-05-09 PROCEDURE — 3077F SYST BP >= 140 MM HG: CPT | Mod: CPTII,S$GLB,, | Performed by: PHYSICIAN ASSISTANT

## 2022-05-09 PROCEDURE — 3008F BODY MASS INDEX DOCD: CPT | Mod: CPTII,S$GLB,, | Performed by: PHYSICIAN ASSISTANT

## 2022-05-09 RX ORDER — DOXYCYCLINE HYCLATE 100 MG
100 TABLET ORAL 2 TIMES DAILY
Qty: 20 TABLET | Refills: 0 | Status: SHIPPED | OUTPATIENT
Start: 2022-05-09 | End: 2022-05-19

## 2022-05-09 RX ORDER — DOXYCYCLINE HYCLATE 100 MG
100 TABLET ORAL 2 TIMES DAILY
Qty: 20 TABLET | Refills: 0 | Status: SHIPPED | OUTPATIENT
Start: 2022-05-09 | End: 2022-05-09 | Stop reason: CLARIF

## 2022-05-09 NOTE — PROGRESS NOTES
"Subjective:       Patient ID: Arthur Moreno is a 67 y.o. male.    Vitals:  height is 5' 11" (1.803 m) and weight is 142.3 kg (313 lb 11.4 oz) (abnormal). His temporal temperature is 98.2 °F (36.8 °C). His blood pressure is 142/84 (abnormal) and his pulse is 60. His respiration is 16 and oxygen saturation is 96%.     Chief Complaint: Recurrent Skin Infections    Pt presents today reporting possible cellulitis on his right ankle that appeared a few days ago. No meds taken for symptoms. Pt reports a history of cellulitis. Pain scale 2/10.    Other  This is a new problem. The current episode started in the past 7 days. The problem occurs constantly. The problem has been unchanged. Pertinent negatives include no abdominal pain, arthralgias, chest pain, chills, congestion, coughing, diaphoresis, fatigue, fever, headaches, joint swelling, myalgias, nausea, neck pain, numbness, rash, sore throat or vomiting. Nothing aggravates the symptoms. He has tried nothing for the symptoms. The treatment provided no relief.       Constitution: Negative for chills, sweating, fatigue and fever.   HENT: Negative for ear pain, drooling, congestion, sore throat, trouble swallowing and voice change.    Neck: Negative for neck pain, neck stiffness, painful lymph nodes and neck swelling.   Cardiovascular: Negative for chest pain, leg swelling, palpitations, sob on exertion and passing out.   Eyes: Negative for eye discharge, eye itching, eye pain, eye redness and eyelid swelling.   Respiratory: Negative for chest tightness, cough, sputum production, bloody sputum, shortness of breath, stridor and wheezing.    Gastrointestinal: Negative for abdominal pain, abdominal bloating, nausea, vomiting, constipation, diarrhea and heartburn.   Genitourinary: Negative for urine decreased.   Musculoskeletal: Negative for joint pain, joint swelling, abnormal ROM of joint, back pain, pain with walking, muscle cramps and muscle ache.   Skin: Positive for " erythema. Negative for rash, bruising, abscess, avulsion and hives.   Allergic/Immunologic: Negative for hives, itching and sneezing.   Neurological: Negative for dizziness, light-headedness, passing out, loss of balance, headaches, altered mental status, loss of consciousness, numbness, tingling and seizures.   Hematologic/Lymphatic: Negative for swollen lymph nodes.   Psychiatric/Behavioral: Negative for altered mental status and nervous/anxious. The patient is not nervous/anxious.        Objective:      Physical Exam   Constitutional: He is oriented to person, place, and time. He appears well-developed. He is cooperative.  Non-toxic appearance. He does not appear ill. No distress.   HENT:   Head: Normocephalic and atraumatic.   Ears:   Right Ear: External ear normal.   Left Ear: External ear normal.   Nose: Nose normal.   Mouth/Throat: Uvula is midline, oropharynx is clear and moist and mucous membranes are normal.   Eyes: Conjunctivae and lids are normal. No scleral icterus.   Neck: Trachea normal and phonation normal. Neck supple. No edema present. No erythema present. No neck rigidity present.   Cardiovascular: Normal rate, regular rhythm, normal heart sounds and normal pulses.   Pulmonary/Chest: Effort normal and breath sounds normal. No accessory muscle usage or stridor. No respiratory distress. He has no decreased breath sounds. He has no wheezes. He has no rhonchi. He has no rales.   Abdominal: Normal appearance.   Musculoskeletal: Normal range of motion.         General: No deformity. Normal range of motion.   Lymphadenopathy:     He has no cervical adenopathy.   Neurological: He is alert and oriented to person, place, and time. He has normal sensation. He exhibits normal muscle tone. Gait normal. Coordination normal.   Skin: Skin is warm, dry, intact, not diaphoretic, not pale, no rash, not vesicular and no abscessed. Capillary refill takes less than 2 seconds. erythema        Psychiatric: His speech is  normal and behavior is normal. Judgment and thought content normal.   Nursing note and vitals reviewed.        Assessment:       1. Cellulitis of right leg          Plan:     Discussed the limitations in the urgent care setting. Advised close follow-up with PCP and/or Specialist for further evaluation as needed. STRICT ER precautions given to patient as well. Patient aware, verbalized understanding and agreed with plan of care.    Cellulitis of right leg    Other orders  -     Discontinue: doxycycline (VIBRA-TABS) 100 MG tablet; Take 1 tablet (100 mg total) by mouth 2 (two) times daily. for 10 days  Dispense: 20 tablet; Refill: 0  -     doxycycline (VIBRA-TABS) 100 MG tablet; Take 1 tablet (100 mg total) by mouth 2 (two) times daily. for 10 days  Dispense: 20 tablet; Refill: 0    There are no Patient Instructions on file for this visit.

## 2022-06-13 NOTE — TELEPHONE ENCOUNTER
----- Message from лЕена Pimentel sent at 7/7/2017  8:38 AM CDT -----  Contact: Patient  Patient needs to have another line filled out for his short term disability form . Please call patient at [phone] for him to explain.   Walk in

## 2022-08-08 ENCOUNTER — PATIENT MESSAGE (OUTPATIENT)
Dept: CARDIOLOGY | Facility: CLINIC | Age: 67
End: 2022-08-08
Payer: MEDICARE

## 2022-08-08 NOTE — PROGRESS NOTES
"Subjective:    Patient ID:  Arthur Moreno is a 67 y.o. male who presents for follow-up of Hypertension and Coronary Artery Disease      Problem List Items Addressed This Visit        Cardiac/Vascular    Carotid artery disease without cerebral infarction    Coronary artery disease involving native coronary artery of native heart without angina pectoris    Mixed hyperlipidemia    Primary hypertension - Primary          HPI    Patient was last seen on 12/29/2021 at which time he was evaluated with an echocardiogram and nuclear stress test.  Weight loss was discussed.  Echocardiogram showed preserved ejection fraction without acute abnormalities.  Nuclear stress test showed no evidence of ischemia.    On assessment today, the patient states that he feels OK.     No chest pain.  No shortness of breath.    Frequency of torsemide use - daily       Objective:     Vitals:    08/09/22 1306   BP: 118/69   BP Location: Left arm   Patient Position: Sitting   BP Method: Large (Automatic)   Pulse: 65   Weight: (!) 144.5 kg (318 lb 9 oz)   Height: 5' 11" (1.803 m)        Physical Exam  Constitutional:       Appearance: He is well-developed.   HENT:      Head: Normocephalic and atraumatic.   Neck:      Vascular: No JVD.   Cardiovascular:      Rate and Rhythm: Normal rate and regular rhythm.      Heart sounds: Normal heart sounds. No murmur heard.    No friction rub. No gallop.   Pulmonary:      Effort: Pulmonary effort is normal. No respiratory distress.      Breath sounds: Normal breath sounds. No wheezing or rales.   Abdominal:      General: Bowel sounds are normal.      Palpations: Abdomen is soft.      Tenderness: There is no abdominal tenderness. There is no guarding or rebound.   Musculoskeletal:      Cervical back: Normal range of motion and neck supple.   Skin:     General: Skin is warm and dry.   Neurological:      Mental Status: He is alert and oriented to person, place, and time.   Psychiatric:         Behavior: " Behavior normal.             Current Outpatient Medications on File Prior to Visit   Medication Sig    allopurinoL (ZYLOPRIM) 300 MG tablet Take 1 tablet (300 mg total) by mouth once daily.    ALPRAZolam (XANAX) 0.25 MG tablet Take 1 tablet (0.25 mg total) by mouth 3 (three) times daily as needed for Anxiety.    aspirin (ECOTRIN) 81 MG EC tablet Take 81 mg by mouth once daily. Patient held since 6/12/16 per md instructions    atenoloL (TENORMIN) 50 MG tablet Take 1 tablet (50 mg total) by mouth once daily.    atorvastatin (LIPITOR) 40 MG tablet Take 1 tablet (40 mg total) by mouth nightly.    b complex vitamins capsule Take 1 capsule by mouth once daily.    co-enzyme Q-10 50 mg capsule Take 50 mg by mouth once daily.    colchicine-probenecid 0.5-500 mg (CO-BENEMID) 500-0.5 mg Tab Take 1 tablet by mouth once daily.    DULoxetine (CYMBALTA) 30 MG capsule Take 2 capsules (60 mg total) by mouth once daily.    folic acid (FOLVITE) 1 MG tablet Take 1 mg by mouth once daily.    fosinopriL (MONOPRIL) 20 MG tablet TAKE 1 TABLET BY MOUTH ONCE DAILY    ketoconazole (NIZORAL) 2 % shampoo Wash beard with medicated shampoo at least 2x/week    Lactobacillus rhamnosus GG (CULTURELLE) 10 billion cell capsule Take 1 capsule by mouth once daily.    levothyroxine (SYNTHROID) 75 MCG tablet Take 1 tablet (75 mcg total) by mouth before breakfast.    multivitamin (THERAGRAN) per tablet Take 1 tablet by mouth once daily.    pantoprazole (PROTONIX) 40 MG tablet Take 1 tablet (40 mg total) by mouth once daily.    potassium chloride (KLOR-CON) 10 MEQ TbSR Take 2 tablets (20 mEq total) by mouth once daily.    torsemide (DEMADEX) 20 MG Tab TAKE ONE TABLET BY MOUTH EVERY MORNING.    ASHWAGANDHA ROOT EXTRACT ORAL Take 1 tablet by mouth once daily.    cephALEXin (KEFLEX) 500 MG capsule TAKE 1 CAPSULE BY MOUTH FOUR TIMES A DAY. (Patient not taking: Reported on 8/9/2022)     No current facility-administered medications on file  prior to visit.       Lipid Panel:   Lab Results   Component Value Date    CHOL 144 04/27/2022    HDL 58 04/27/2022    LDLCALC 70.0 04/27/2022    TRIG 80 04/27/2022    CHOLHDL 40.3 04/27/2022       The 10-year ASCVD risk score (Alysha CASTELLANOS Jr., et al., 2013) is: 12.3%    Values used to calculate the score:      Age: 67 years      Sex: Male      Is Non- : No      Diabetic: No      Tobacco smoker: No      Systolic Blood Pressure: 118 mmHg      Is BP treated: Yes      HDL Cholesterol: 58 mg/dL      Total Cholesterol: 144 mg/dL    All pertinent labs, imaging, and EKGs reviewed.  Patient's most recent EKG tracing was personally interpreted by this provider.    Assessment:       1. Primary hypertension    2. Mixed hyperlipidemia    3. Coronary artery disease involving native coronary artery of native heart without angina pectoris    4. Carotid artery disease without cerebral infarction         Plan:     Symptoms OK  BP/Pulse OK today  Most recent echocardiogram, nuclear stress test, and lipid panel reviewed personally     Continue aspirin 81 mg PO Daily  Continue atorvastatin 40 mg PO Daily  Continue atenolol 50 mg PO Daily  Continue Monopril 20 mg PO Daily  Continue torsemide 20 mg PO Daily with double dosing PRN volume   Going to cut down ETOH use    Continue other cardiac medications  Mediterranean Diet/Cardiovascular Exercise Program    Patient queried and all questions were answered.    F/u in 6 months to reassess      Signed:    Cameron Carlson MD  8/9/2022 9:41 AM

## 2022-08-09 ENCOUNTER — OFFICE VISIT (OUTPATIENT)
Dept: CARDIOLOGY | Facility: CLINIC | Age: 67
End: 2022-08-09
Payer: MEDICARE

## 2022-08-09 ENCOUNTER — LAB VISIT (OUTPATIENT)
Dept: LAB | Facility: HOSPITAL | Age: 67
End: 2022-08-09
Attending: INTERNAL MEDICINE
Payer: MEDICARE

## 2022-08-09 VITALS
SYSTOLIC BLOOD PRESSURE: 118 MMHG | WEIGHT: 315 LBS | BODY MASS INDEX: 44.1 KG/M2 | HEIGHT: 71 IN | HEART RATE: 65 BPM | DIASTOLIC BLOOD PRESSURE: 69 MMHG

## 2022-08-09 DIAGNOSIS — E78.2 MIXED HYPERLIPIDEMIA: ICD-10-CM

## 2022-08-09 DIAGNOSIS — I77.9 CAROTID ARTERY DISEASE WITHOUT CEREBRAL INFARCTION: ICD-10-CM

## 2022-08-09 DIAGNOSIS — I25.10 CORONARY ARTERY DISEASE INVOLVING NATIVE CORONARY ARTERY OF NATIVE HEART WITHOUT ANGINA PECTORIS: ICD-10-CM

## 2022-08-09 DIAGNOSIS — I10 PRIMARY HYPERTENSION: Primary | ICD-10-CM

## 2022-08-09 LAB
CHOLEST SERPL-MCNC: 152 MG/DL (ref 120–199)
CHOLEST/HDLC SERPL: 2.1 {RATIO} (ref 2–5)
HDLC SERPL-MCNC: 71 MG/DL (ref 40–75)
HDLC SERPL: 46.7 % (ref 20–50)
LDLC SERPL CALC-MCNC: 67 MG/DL (ref 63–159)
NONHDLC SERPL-MCNC: 81 MG/DL
TRIGL SERPL-MCNC: 70 MG/DL (ref 30–150)

## 2022-08-09 PROCEDURE — 3288F PR FALLS RISK ASSESSMENT DOCUMENTED: ICD-10-PCS | Mod: CPTII,S$GLB,, | Performed by: INTERNAL MEDICINE

## 2022-08-09 PROCEDURE — 3008F BODY MASS INDEX DOCD: CPT | Mod: CPTII,S$GLB,, | Performed by: INTERNAL MEDICINE

## 2022-08-09 PROCEDURE — 3078F DIAST BP <80 MM HG: CPT | Mod: CPTII,S$GLB,, | Performed by: INTERNAL MEDICINE

## 2022-08-09 PROCEDURE — 3044F HG A1C LEVEL LT 7.0%: CPT | Mod: CPTII,S$GLB,, | Performed by: INTERNAL MEDICINE

## 2022-08-09 PROCEDURE — 3074F PR MOST RECENT SYSTOLIC BLOOD PRESSURE < 130 MM HG: ICD-10-PCS | Mod: CPTII,S$GLB,, | Performed by: INTERNAL MEDICINE

## 2022-08-09 PROCEDURE — 1160F RVW MEDS BY RX/DR IN RCRD: CPT | Mod: CPTII,S$GLB,, | Performed by: INTERNAL MEDICINE

## 2022-08-09 PROCEDURE — 1126F PR PAIN SEVERITY QUANTIFIED, NO PAIN PRESENT: ICD-10-PCS | Mod: CPTII,S$GLB,, | Performed by: INTERNAL MEDICINE

## 2022-08-09 PROCEDURE — 80061 LIPID PANEL: CPT | Performed by: INTERNAL MEDICINE

## 2022-08-09 PROCEDURE — 3288F FALL RISK ASSESSMENT DOCD: CPT | Mod: CPTII,S$GLB,, | Performed by: INTERNAL MEDICINE

## 2022-08-09 PROCEDURE — 4010F PR ACE/ARB THEARPY RXD/TAKEN: ICD-10-PCS | Mod: CPTII,S$GLB,, | Performed by: INTERNAL MEDICINE

## 2022-08-09 PROCEDURE — 3078F PR MOST RECENT DIASTOLIC BLOOD PRESSURE < 80 MM HG: ICD-10-PCS | Mod: CPTII,S$GLB,, | Performed by: INTERNAL MEDICINE

## 2022-08-09 PROCEDURE — 3074F SYST BP LT 130 MM HG: CPT | Mod: CPTII,S$GLB,, | Performed by: INTERNAL MEDICINE

## 2022-08-09 PROCEDURE — 1159F MED LIST DOCD IN RCRD: CPT | Mod: CPTII,S$GLB,, | Performed by: INTERNAL MEDICINE

## 2022-08-09 PROCEDURE — 1101F PT FALLS ASSESS-DOCD LE1/YR: CPT | Mod: CPTII,S$GLB,, | Performed by: INTERNAL MEDICINE

## 2022-08-09 PROCEDURE — 99214 PR OFFICE/OUTPT VISIT, EST, LEVL IV, 30-39 MIN: ICD-10-PCS | Mod: S$GLB,,, | Performed by: INTERNAL MEDICINE

## 2022-08-09 PROCEDURE — 36415 COLL VENOUS BLD VENIPUNCTURE: CPT | Mod: PO | Performed by: INTERNAL MEDICINE

## 2022-08-09 PROCEDURE — 99999 PR PBB SHADOW E&M-EST. PATIENT-LVL III: ICD-10-PCS | Mod: PBBFAC,,, | Performed by: INTERNAL MEDICINE

## 2022-08-09 PROCEDURE — 1160F PR REVIEW ALL MEDS BY PRESCRIBER/CLIN PHARMACIST DOCUMENTED: ICD-10-PCS | Mod: CPTII,S$GLB,, | Performed by: INTERNAL MEDICINE

## 2022-08-09 PROCEDURE — 4010F ACE/ARB THERAPY RXD/TAKEN: CPT | Mod: CPTII,S$GLB,, | Performed by: INTERNAL MEDICINE

## 2022-08-09 PROCEDURE — 99999 PR PBB SHADOW E&M-EST. PATIENT-LVL III: CPT | Mod: PBBFAC,,, | Performed by: INTERNAL MEDICINE

## 2022-08-09 PROCEDURE — 1101F PR PT FALLS ASSESS DOC 0-1 FALLS W/OUT INJ PAST YR: ICD-10-PCS | Mod: CPTII,S$GLB,, | Performed by: INTERNAL MEDICINE

## 2022-08-09 PROCEDURE — 3008F PR BODY MASS INDEX (BMI) DOCUMENTED: ICD-10-PCS | Mod: CPTII,S$GLB,, | Performed by: INTERNAL MEDICINE

## 2022-08-09 PROCEDURE — 1159F PR MEDICATION LIST DOCUMENTED IN MEDICAL RECORD: ICD-10-PCS | Mod: CPTII,S$GLB,, | Performed by: INTERNAL MEDICINE

## 2022-08-09 PROCEDURE — 1126F AMNT PAIN NOTED NONE PRSNT: CPT | Mod: CPTII,S$GLB,, | Performed by: INTERNAL MEDICINE

## 2022-08-09 PROCEDURE — 99214 OFFICE O/P EST MOD 30 MIN: CPT | Mod: S$GLB,,, | Performed by: INTERNAL MEDICINE

## 2022-08-09 PROCEDURE — 3044F PR MOST RECENT HEMOGLOBIN A1C LEVEL <7.0%: ICD-10-PCS | Mod: CPTII,S$GLB,, | Performed by: INTERNAL MEDICINE

## 2022-10-02 PROBLEM — E66.813 CLASS 3 SEVERE OBESITY WITH BODY MASS INDEX (BMI) OF 40.0 TO 44.9 IN ADULT: Chronic | Status: ACTIVE | Noted: 2021-10-29

## 2022-10-02 PROBLEM — Z78.9 DECREASED ACTIVITIES OF DAILY LIVING (ADL): Chronic | Status: ACTIVE | Noted: 2019-05-26

## 2022-10-02 PROBLEM — Z79.01 LONG TERM (CURRENT) USE OF ANTICOAGULANTS: Chronic | Status: ACTIVE | Noted: 2017-07-26

## 2022-10-02 PROBLEM — K76.0 HEPATIC STEATOSIS: Chronic | Status: ACTIVE | Noted: 2021-02-25

## 2022-10-02 PROBLEM — F10.20 ETOH DEPENDENCE: Chronic | Status: ACTIVE | Noted: 2020-07-18

## 2022-10-02 PROBLEM — E29.1 HYPOGONADISM IN MALE: Chronic | Status: ACTIVE | Noted: 2017-05-29

## 2022-10-02 PROBLEM — M17.0 PRIMARY OSTEOARTHRITIS OF BOTH KNEES: Chronic | Status: ACTIVE | Noted: 2017-05-23

## 2022-10-02 PROBLEM — H90.3 BILATERAL SENSORINEURAL HEARING LOSS: Chronic | Status: ACTIVE | Noted: 2021-11-23

## 2022-10-02 PROBLEM — E66.01 CLASS 3 SEVERE OBESITY WITH BODY MASS INDEX (BMI) OF 40.0 TO 44.9 IN ADULT: Chronic | Status: ACTIVE | Noted: 2021-10-29

## 2022-10-02 PROBLEM — I89.0 LYMPHEDEMA OF BOTH LOWER EXTREMITIES: Chronic | Status: ACTIVE | Noted: 2021-08-04

## 2022-10-02 PROBLEM — D69.6 THROMBOCYTOPENIA: Chronic | Status: ACTIVE | Noted: 2021-06-25

## 2022-10-02 PROBLEM — M47.22 CERVICAL SPONDYLOSIS WITH RADICULOPATHY: Chronic | Status: ACTIVE | Noted: 2019-12-01

## 2022-10-02 PROBLEM — Z79.82 ASPIRIN LONG-TERM USE: Chronic | Status: ACTIVE | Noted: 2019-05-02

## 2022-10-03 PROBLEM — U07.1 COVID-19: Status: ACTIVE | Noted: 2022-10-03

## 2022-10-12 PROBLEM — K56.609 SBO (SMALL BOWEL OBSTRUCTION): Status: RESOLVED | Noted: 2021-10-28 | Resolved: 2022-10-12

## 2022-10-12 PROBLEM — E66.01 MORBIDLY OBESE: Status: ACTIVE | Noted: 2022-10-12

## 2022-10-12 PROBLEM — I10 ESSENTIAL HYPERTENSION: Status: ACTIVE | Noted: 2022-08-01

## 2022-10-26 PROBLEM — I10 ESSENTIAL HYPERTENSION: Chronic | Status: ACTIVE | Noted: 2022-08-01

## 2022-10-26 PROBLEM — Z98.1 HISTORY OF FUSION OF LUMBAR SPINE: Status: ACTIVE | Noted: 2022-10-26

## 2022-11-15 ENCOUNTER — OFFICE VISIT (OUTPATIENT)
Dept: OTOLARYNGOLOGY | Facility: CLINIC | Age: 67
End: 2022-11-15
Payer: MEDICARE

## 2022-11-15 VITALS — HEIGHT: 71 IN | WEIGHT: 315 LBS | TEMPERATURE: 99 F | BODY MASS INDEX: 44.1 KG/M2

## 2022-11-15 DIAGNOSIS — H90.3 BILATERAL HIGH FREQUENCY SENSORINEURAL HEARING LOSS: Primary | ICD-10-CM

## 2022-11-15 DIAGNOSIS — H61.23 BILATERAL IMPACTED CERUMEN: ICD-10-CM

## 2022-11-15 PROCEDURE — 3288F FALL RISK ASSESSMENT DOCD: CPT | Mod: CPTII,S$GLB,, | Performed by: NURSE PRACTITIONER

## 2022-11-15 PROCEDURE — 1159F PR MEDICATION LIST DOCUMENTED IN MEDICAL RECORD: ICD-10-PCS | Mod: CPTII,S$GLB,, | Performed by: NURSE PRACTITIONER

## 2022-11-15 PROCEDURE — 99999 PR PBB SHADOW E&M-EST. PATIENT-LVL III: ICD-10-PCS | Mod: PBBFAC,,, | Performed by: NURSE PRACTITIONER

## 2022-11-15 PROCEDURE — 69210 PR REMOVAL IMPACTED CERUMEN REQUIRING INSTRUMENTATION, UNILATERAL: ICD-10-PCS | Mod: S$GLB,,, | Performed by: NURSE PRACTITIONER

## 2022-11-15 PROCEDURE — 1126F PR PAIN SEVERITY QUANTIFIED, NO PAIN PRESENT: ICD-10-PCS | Mod: CPTII,S$GLB,, | Performed by: NURSE PRACTITIONER

## 2022-11-15 PROCEDURE — 3044F HG A1C LEVEL LT 7.0%: CPT | Mod: CPTII,S$GLB,, | Performed by: NURSE PRACTITIONER

## 2022-11-15 PROCEDURE — 1101F PT FALLS ASSESS-DOCD LE1/YR: CPT | Mod: CPTII,S$GLB,, | Performed by: NURSE PRACTITIONER

## 2022-11-15 PROCEDURE — 99213 PR OFFICE/OUTPT VISIT, EST, LEVL III, 20-29 MIN: ICD-10-PCS | Mod: 25,S$GLB,, | Performed by: NURSE PRACTITIONER

## 2022-11-15 PROCEDURE — 1101F PR PT FALLS ASSESS DOC 0-1 FALLS W/OUT INJ PAST YR: ICD-10-PCS | Mod: CPTII,S$GLB,, | Performed by: NURSE PRACTITIONER

## 2022-11-15 PROCEDURE — 4010F ACE/ARB THERAPY RXD/TAKEN: CPT | Mod: CPTII,S$GLB,, | Performed by: NURSE PRACTITIONER

## 2022-11-15 PROCEDURE — 1126F AMNT PAIN NOTED NONE PRSNT: CPT | Mod: CPTII,S$GLB,, | Performed by: NURSE PRACTITIONER

## 2022-11-15 PROCEDURE — 1159F MED LIST DOCD IN RCRD: CPT | Mod: CPTII,S$GLB,, | Performed by: NURSE PRACTITIONER

## 2022-11-15 PROCEDURE — 99213 OFFICE O/P EST LOW 20 MIN: CPT | Mod: 25,S$GLB,, | Performed by: NURSE PRACTITIONER

## 2022-11-15 PROCEDURE — 4010F PR ACE/ARB THEARPY RXD/TAKEN: ICD-10-PCS | Mod: CPTII,S$GLB,, | Performed by: NURSE PRACTITIONER

## 2022-11-15 PROCEDURE — 3008F BODY MASS INDEX DOCD: CPT | Mod: CPTII,S$GLB,, | Performed by: NURSE PRACTITIONER

## 2022-11-15 PROCEDURE — 3008F PR BODY MASS INDEX (BMI) DOCUMENTED: ICD-10-PCS | Mod: CPTII,S$GLB,, | Performed by: NURSE PRACTITIONER

## 2022-11-15 PROCEDURE — 99999 PR PBB SHADOW E&M-EST. PATIENT-LVL III: CPT | Mod: PBBFAC,,, | Performed by: NURSE PRACTITIONER

## 2022-11-15 PROCEDURE — 69210 REMOVE IMPACTED EAR WAX UNI: CPT | Mod: S$GLB,,, | Performed by: NURSE PRACTITIONER

## 2022-11-15 PROCEDURE — 3288F PR FALLS RISK ASSESSMENT DOCUMENTED: ICD-10-PCS | Mod: CPTII,S$GLB,, | Performed by: NURSE PRACTITIONER

## 2022-11-15 PROCEDURE — 3044F PR MOST RECENT HEMOGLOBIN A1C LEVEL <7.0%: ICD-10-PCS | Mod: CPTII,S$GLB,, | Performed by: NURSE PRACTITIONER

## 2022-11-15 NOTE — PROGRESS NOTES
Subjective:       Patient ID: Arthur Moreno is a 67 y.o. male.    Chief Complaint: ear check    HPI   Patient returns for ear recheck. Seen last year with reports of hearing loss since youth, [facility] because [facility]'t pass hearing test. Was told he had high-pitched hearing loss. 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 Denies tinnitus. (+)Family h/o HL. Trouble hearing if background noise is present.     Review of Systems   Constitutional: Negative.    HENT:  Positive for ear pain and hearing loss. Negative for tinnitus.    Eyes:  Positive for itching.   Respiratory: Negative.     Cardiovascular: Negative.    Gastrointestinal: Negative.    Endocrine: Negative.    Genitourinary: Negative.    Musculoskeletal:  Positive for back pain and neck pain.   Integumentary:  Negative.   Allergic/Immunologic: Negative.    Neurological: Negative.    Hematological: Negative.    Psychiatric/Behavioral:  The patient is nervous/anxious.        Objective:      Physical Exam  Vitals and nursing note reviewed.   Constitutional:       General: He is not in acute distress.     Appearance: He is well-developed. He is not ill-appearing or diaphoretic.   HENT:      Head: Normocephalic and atraumatic.      Right Ear: Hearing, tympanic membrane, ear canal and external ear normal. No middle ear effusion. Tympanic membrane is not erythematous.      Left Ear: Hearing, tympanic membrane, ear canal and external ear normal.  No middle ear effusion. Tympanic membrane is not erythematous.      Nose: Nose normal.   Eyes:      General: Lids are normal. No scleral icterus.        Right eye: No discharge.         Left eye: No discharge.   Neck:      Trachea: Trachea normal. No tracheal deviation.   Cardiovascular:      Rate and Rhythm: Normal rate.   Pulmonary:      Effort: Pulmonary effort is normal. No respiratory distress.      Breath sounds: No stridor. No wheezing.   Musculoskeletal:         General: Normal range of  motion.      Cervical back: Normal range of motion and neck supple.   Skin:     General: Skin is warm and dry.      Coloration: Skin is not pale.   Neurological:      Mental Status: He is alert and oriented to person, place, and time.      Coordination: Coordination normal.      Gait: Gait normal.   Psychiatric:         Speech: Speech normal.         Behavior: Behavior normal. Behavior is cooperative.         Thought Content: Thought content normal.         Judgment: Judgment normal.       SEPARATE PROCEDURE IN OFFICE:   Procedure: Removal of impacted cerumen, bilateral   Pre Procedure Diagnosis: Cerumen Impaction   Post Procedure Diagnosis: Cerumen Impaction   Verbal informed consent in regards to risk of trauma to ear canal, ear drum or hearing, discomfort during procedure and/or inability to remove cerumen impaction in one session or unforeseen events or complications.   No anesthesia.     Procedure in detail:   Ear canal visualized bilateral with appropriate size ear speculum utilizing Operating Head Binocular Otomicroscope   Utilizing the following:  Ring curet, delicate alligator forceps, and/or suction cannula was used. The impacted cerumen of the ear canals was removed atraumatically. The TM and EAC were then inspected and found to be clear of wax. See description of TMs/EACs in PE above.   Complications: No   Condition: Improved/Good     Assessment:       Problem List Items Addressed This Visit    None  Visit Diagnoses       Bilateral high frequency sensorineural hearing loss    -  Primary    Bilateral impacted cerumen                Plan:      Patient is due for annual audiogram on or after 11/24/2022. Scheduled today. We discussed MRI-IAC last year.     Patient encouraged to return to clinic if symptoms worsen/persist and as needed for further ENT symptoms or concerns.         Answers for HPI/ROS submitted by the patient on 11/22/2021  Sleep Apnea?: Yes  Muscle aches / pain?: Yes

## 2022-11-29 ENCOUNTER — CLINICAL SUPPORT (OUTPATIENT)
Dept: AUDIOLOGY | Facility: CLINIC | Age: 67
End: 2022-11-29
Payer: MEDICARE

## 2022-11-29 ENCOUNTER — TELEPHONE (OUTPATIENT)
Dept: OTOLARYNGOLOGY | Facility: CLINIC | Age: 67
End: 2022-11-29
Payer: MEDICARE

## 2022-11-29 DIAGNOSIS — F41.9 ANXIETY: Primary | ICD-10-CM

## 2022-11-29 DIAGNOSIS — H90.A22 SENSORINEURAL HEARING LOSS (SNHL) OF LEFT EAR WITH RESTRICTED HEARING OF RIGHT EAR: Primary | ICD-10-CM

## 2022-11-29 DIAGNOSIS — Z01.812 PRE-PROCEDURE LAB EXAM: ICD-10-CM

## 2022-11-29 DIAGNOSIS — H90.3 ASYMMETRICAL SENSORINEURAL HEARING LOSS: Primary | ICD-10-CM

## 2022-11-29 PROCEDURE — 92556 SPEECH AUDIOMETRY COMPLETE: CPT | Mod: S$GLB,,, | Performed by: AUDIOLOGIST-HEARING AID FITTER

## 2022-11-29 PROCEDURE — 92556 PR SPEECH AUDIOMETRY, COMPLETE: ICD-10-PCS | Mod: S$GLB,,, | Performed by: AUDIOLOGIST-HEARING AID FITTER

## 2022-11-29 PROCEDURE — 92552 PR PURE TONE AUDIOMETRY, AIR: ICD-10-PCS | Mod: S$GLB,,, | Performed by: AUDIOLOGIST-HEARING AID FITTER

## 2022-11-29 PROCEDURE — 92552 PURE TONE AUDIOMETRY AIR: CPT | Mod: S$GLB,,, | Performed by: AUDIOLOGIST-HEARING AID FITTER

## 2022-11-29 RX ORDER — DIAZEPAM 10 MG/1
10 TABLET ORAL ONCE
Qty: 1 TABLET | Refills: 0 | Status: ON HOLD | OUTPATIENT
Start: 2022-11-29 | End: 2023-02-19 | Stop reason: HOSPADM

## 2022-11-29 NOTE — TELEPHONE ENCOUNTER
----- Message from Alexia Mcdonough NP sent at 11/29/2022  4:23 PM CST -----  Dr. Christensen,  Would you please prescribe a valium for an MRI on this patient for me? He is a pretty large oleksandr so can probably handle a moderate dose. He is aware that he will need to have a  with him.   Thank you,  Alexia

## 2022-11-29 NOTE — Clinical Note
Hearing testing completed. Results reveal a normal through 2K Hz sloping to severe HF sensorineural hearing loss for the right ear and  normal through 750Hz sloping to profound sensorineural hearing loss for the left ear.  Significant changes were noted in the high freq range and with WR AS when compared to previous hearing testing from 11/23/21.

## 2022-11-29 NOTE — PROGRESS NOTES
Arthur Moreno was seen 11/29/2022 for an audiological evaluation. Pertinent complaints today include hearing loss. 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 He denies family Hx of HL and tinnitus. Pt is being followed medically for asymmetrical HL. Otoscopy revealed moderate cerumen in both ears. The tympanic membrane was visualized AU prior to proceeding with the hearing testing.     Results reveal a normal through 2K Hz sloping to severe HF sensorineural hearing loss for the right ear and  normal through 750Hz sloping to profound sensorineural hearing loss for the left ear.    Speech Reception Thresholds were  5 dBHL for the right ear and 30 dBHL for the left ear.    Word recognition scores were good for the right ear and fair for the left ear. Significant changes were noted in the high freq range and with WR AS when compared to previous hearing testing from 11/23/21.     Audiogram results were reviewed in detail with patient and all questions were answered. Results will be reviewed by the referring provider at the completion of this note. Recommend further medical eval for the significant changes AS in the high freq range and with WR, binaural amplification pending medical clearance, repeat hearing testing in one year due to loud noise exposure and bilateral hearing protection with either muffs or in-ear protection in loud noises.

## 2022-11-30 ENCOUNTER — LAB VISIT (OUTPATIENT)
Dept: LAB | Facility: HOSPITAL | Age: 67
End: 2022-11-30
Payer: MEDICARE

## 2022-11-30 DIAGNOSIS — Z01.812 PRE-PROCEDURE LAB EXAM: ICD-10-CM

## 2022-11-30 LAB
CREAT SERPL-MCNC: 1.4 MG/DL (ref 0.5–1.4)
EST. GFR  (NO RACE VARIABLE): 54.7 ML/MIN/1.73 M^2

## 2022-11-30 PROCEDURE — 36415 COLL VENOUS BLD VENIPUNCTURE: CPT | Mod: PO | Performed by: NURSE PRACTITIONER

## 2022-11-30 PROCEDURE — 82565 ASSAY OF CREATININE: CPT | Performed by: NURSE PRACTITIONER

## 2022-12-15 ENCOUNTER — HOSPITAL ENCOUNTER (OUTPATIENT)
Dept: RADIOLOGY | Facility: HOSPITAL | Age: 67
Discharge: HOME | End: 2022-12-15
Attending: NURSE PRACTITIONER
Payer: MEDICARE

## 2022-12-15 DIAGNOSIS — H90.A22 SENSORINEURAL HEARING LOSS (SNHL) OF LEFT EAR WITH RESTRICTED HEARING OF RIGHT EAR: ICD-10-CM

## 2022-12-15 PROCEDURE — 70553 MRI BRAIN STEM W/O & W/DYE: CPT | Mod: 26,,, | Performed by: RADIOLOGY

## 2022-12-15 PROCEDURE — 25500020 PHARM REV CODE 255: Mod: PO | Performed by: NURSE PRACTITIONER

## 2022-12-15 PROCEDURE — A9585 GADOBUTROL INJECTION: HCPCS | Mod: PO | Performed by: NURSE PRACTITIONER

## 2022-12-15 PROCEDURE — 70553 MRI BRAIN STEM W/O & W/DYE: CPT | Mod: TC,PO

## 2022-12-15 PROCEDURE — 70553 MRI IAC/TEMPORAL BONES W W/O CONTRAST: ICD-10-PCS | Mod: 26,,, | Performed by: RADIOLOGY

## 2022-12-15 RX ORDER — GADOBUTROL 604.72 MG/ML
10 INJECTION INTRAVENOUS
Status: COMPLETED | OUTPATIENT
Start: 2022-12-15 | End: 2022-12-15

## 2022-12-15 RX ADMIN — GADOBUTROL 10 ML: 604.72 INJECTION INTRAVENOUS at 03:12

## 2023-01-17 ENCOUNTER — TELEPHONE (OUTPATIENT)
Dept: CARDIOLOGY | Facility: CLINIC | Age: 68
End: 2023-01-17
Payer: MEDICARE

## 2023-01-19 ENCOUNTER — PATIENT MESSAGE (OUTPATIENT)
Dept: CARDIOLOGY | Facility: CLINIC | Age: 68
End: 2023-01-19
Payer: MEDICARE

## 2023-02-14 ENCOUNTER — OFFICE VISIT (OUTPATIENT)
Dept: CARDIOLOGY | Facility: CLINIC | Age: 68
End: 2023-02-14
Payer: MEDICARE

## 2023-02-14 VITALS
DIASTOLIC BLOOD PRESSURE: 79 MMHG | SYSTOLIC BLOOD PRESSURE: 156 MMHG | BODY MASS INDEX: 44.1 KG/M2 | HEIGHT: 71 IN | HEART RATE: 120 BPM | WEIGHT: 315 LBS

## 2023-02-14 DIAGNOSIS — E78.2 MIXED HYPERLIPIDEMIA: Chronic | ICD-10-CM

## 2023-02-14 DIAGNOSIS — E66.01 MORBIDLY OBESE: ICD-10-CM

## 2023-02-14 DIAGNOSIS — E03.9 ACQUIRED HYPOTHYROIDISM: Chronic | ICD-10-CM

## 2023-02-14 DIAGNOSIS — M25.562 ACUTE PAIN OF LEFT KNEE: Primary | ICD-10-CM

## 2023-02-14 DIAGNOSIS — I10 ESSENTIAL HYPERTENSION: Chronic | ICD-10-CM

## 2023-02-14 DIAGNOSIS — I25.10 CORONARY ARTERY DISEASE INVOLVING NATIVE CORONARY ARTERY OF NATIVE HEART WITHOUT ANGINA PECTORIS: Primary | Chronic | ICD-10-CM

## 2023-02-14 DIAGNOSIS — I77.9 CAROTID ARTERY DISEASE WITHOUT CEREBRAL INFARCTION: Chronic | ICD-10-CM

## 2023-02-14 DIAGNOSIS — R00.0 TACHYCARDIA: ICD-10-CM

## 2023-02-14 DIAGNOSIS — K76.0 HEPATIC STEATOSIS: Chronic | ICD-10-CM

## 2023-02-14 DIAGNOSIS — R73.03 PREDIABETES: Chronic | ICD-10-CM

## 2023-02-14 DIAGNOSIS — G47.33 OBSTRUCTIVE SLEEP APNEA SYNDROME: ICD-10-CM

## 2023-02-14 DIAGNOSIS — I87.2 VENOUS INSUFFICIENCY OF BOTH LOWER EXTREMITIES: Chronic | ICD-10-CM

## 2023-02-14 DIAGNOSIS — F10.20 UNCOMPLICATED ALCOHOL DEPENDENCE: Chronic | ICD-10-CM

## 2023-02-14 DIAGNOSIS — I89.0 LYMPHEDEMA OF BOTH LOWER EXTREMITIES: Chronic | ICD-10-CM

## 2023-02-14 PROCEDURE — 3288F PR FALLS RISK ASSESSMENT DOCUMENTED: ICD-10-PCS | Mod: CPTII,S$GLB,, | Performed by: PHYSICIAN ASSISTANT

## 2023-02-14 PROCEDURE — 4010F ACE/ARB THERAPY RXD/TAKEN: CPT | Mod: CPTII,S$GLB,, | Performed by: PHYSICIAN ASSISTANT

## 2023-02-14 PROCEDURE — 3044F HG A1C LEVEL LT 7.0%: CPT | Mod: CPTII,S$GLB,, | Performed by: PHYSICIAN ASSISTANT

## 2023-02-14 PROCEDURE — 99214 PR OFFICE/OUTPT VISIT, EST, LEVL IV, 30-39 MIN: ICD-10-PCS | Mod: S$GLB,,, | Performed by: PHYSICIAN ASSISTANT

## 2023-02-14 PROCEDURE — 3078F DIAST BP <80 MM HG: CPT | Mod: CPTII,S$GLB,, | Performed by: PHYSICIAN ASSISTANT

## 2023-02-14 PROCEDURE — 4010F PR ACE/ARB THEARPY RXD/TAKEN: ICD-10-PCS | Mod: CPTII,S$GLB,, | Performed by: PHYSICIAN ASSISTANT

## 2023-02-14 PROCEDURE — 1126F AMNT PAIN NOTED NONE PRSNT: CPT | Mod: CPTII,S$GLB,, | Performed by: PHYSICIAN ASSISTANT

## 2023-02-14 PROCEDURE — 3078F PR MOST RECENT DIASTOLIC BLOOD PRESSURE < 80 MM HG: ICD-10-PCS | Mod: CPTII,S$GLB,, | Performed by: PHYSICIAN ASSISTANT

## 2023-02-14 PROCEDURE — 99214 OFFICE O/P EST MOD 30 MIN: CPT | Mod: S$GLB,,, | Performed by: PHYSICIAN ASSISTANT

## 2023-02-14 PROCEDURE — 3288F FALL RISK ASSESSMENT DOCD: CPT | Mod: CPTII,S$GLB,, | Performed by: PHYSICIAN ASSISTANT

## 2023-02-14 PROCEDURE — 93010 ELECTROCARDIOGRAM REPORT: CPT | Mod: S$GLB,,, | Performed by: INTERNAL MEDICINE

## 2023-02-14 PROCEDURE — 1126F PR PAIN SEVERITY QUANTIFIED, NO PAIN PRESENT: ICD-10-PCS | Mod: CPTII,S$GLB,, | Performed by: PHYSICIAN ASSISTANT

## 2023-02-14 PROCEDURE — 3077F PR MOST RECENT SYSTOLIC BLOOD PRESSURE >= 140 MM HG: ICD-10-PCS | Mod: CPTII,S$GLB,, | Performed by: PHYSICIAN ASSISTANT

## 2023-02-14 PROCEDURE — 3044F PR MOST RECENT HEMOGLOBIN A1C LEVEL <7.0%: ICD-10-PCS | Mod: CPTII,S$GLB,, | Performed by: PHYSICIAN ASSISTANT

## 2023-02-14 PROCEDURE — 93005 ELECTROCARDIOGRAM TRACING: CPT | Mod: PO

## 2023-02-14 PROCEDURE — 3008F PR BODY MASS INDEX (BMI) DOCUMENTED: ICD-10-PCS | Mod: CPTII,S$GLB,, | Performed by: PHYSICIAN ASSISTANT

## 2023-02-14 PROCEDURE — 93010 EKG 12-LEAD: ICD-10-PCS | Mod: S$GLB,,, | Performed by: INTERNAL MEDICINE

## 2023-02-14 PROCEDURE — 1101F PT FALLS ASSESS-DOCD LE1/YR: CPT | Mod: CPTII,S$GLB,, | Performed by: PHYSICIAN ASSISTANT

## 2023-02-14 PROCEDURE — 1101F PR PT FALLS ASSESS DOC 0-1 FALLS W/OUT INJ PAST YR: ICD-10-PCS | Mod: CPTII,S$GLB,, | Performed by: PHYSICIAN ASSISTANT

## 2023-02-14 PROCEDURE — 99999 PR PBB SHADOW E&M-EST. PATIENT-LVL IV: ICD-10-PCS | Mod: PBBFAC,,, | Performed by: PHYSICIAN ASSISTANT

## 2023-02-14 PROCEDURE — 3008F BODY MASS INDEX DOCD: CPT | Mod: CPTII,S$GLB,, | Performed by: PHYSICIAN ASSISTANT

## 2023-02-14 PROCEDURE — 3077F SYST BP >= 140 MM HG: CPT | Mod: CPTII,S$GLB,, | Performed by: PHYSICIAN ASSISTANT

## 2023-02-14 PROCEDURE — 99999 PR PBB SHADOW E&M-EST. PATIENT-LVL IV: CPT | Mod: PBBFAC,,, | Performed by: PHYSICIAN ASSISTANT

## 2023-02-14 NOTE — PROGRESS NOTES
"Subjective:    Patient ID:  Arthur Moreno is a 67 y.o. male who presents for follow-up of CAD.      HPI  Mr. Moreno is a very pleasant gentleman who follows with Dr. Carlson. He presents today for routine follow up. He is without cardiovascular complaints. No CP, VALVERDE, or change in his exercise tolerance.     He c/o left knee pain and chronic LE edema from lymphedema.     Review of Systems   Constitutional: Positive for malaise/fatigue. Negative for weight gain and weight loss.   Eyes:  Negative for blurred vision, vision loss in left eye, vision loss in right eye and visual disturbance.   Cardiovascular:  Positive for dyspnea on exertion and leg swelling. Negative for claudication, near-syncope, orthopnea, palpitations, paroxysmal nocturnal dyspnea and syncope.   Respiratory:  Negative for hemoptysis, shortness of breath, sputum production and wheezing.    Endocrine: Negative for cold intolerance and heat intolerance.   Hematologic/Lymphatic: Negative for adenopathy. Does not bruise/bleed easily.   Musculoskeletal:  Negative for falls and muscle weakness.   Gastrointestinal:  Negative for constipation, diarrhea and melena.   Genitourinary:  Negative for bladder incontinence.   Neurological:  Negative for dizziness, focal weakness and light-headedness.   Psychiatric/Behavioral:  Negative for altered mental status.       Vitals:    02/14/23 1242   BP: (!) 156/79   BP Location: Left arm   Patient Position: Sitting   BP Method: Medium (Automatic)   Pulse: (!) 120   Weight: (!) 151.2 kg (333 lb 5.4 oz)   Height: 5' 11" (1.803 m)   Body mass index is 46.49 kg/m².    Objective:    Physical Exam  Constitutional:       Appearance: He is well-developed.   HENT:      Head: Normocephalic and atraumatic.   Eyes:      Extraocular Movements: Extraocular movements intact.      Pupils: Pupils are equal, round, and reactive to light.   Neck:      Thyroid: No thyromegaly.      Vascular: No JVD.      Trachea: No tracheal " deviation.   Cardiovascular:      Rate and Rhythm: Normal rate and regular rhythm.      Chest Wall: PMI is not displaced.      Pulses: Normal pulses and intact distal pulses.      Heart sounds: S1 normal and S2 normal. No murmur heard.    No friction rub. No gallop.   Pulmonary:      Effort: Pulmonary effort is normal. No respiratory distress.      Breath sounds: Normal breath sounds. No wheezing or rales.   Chest:      Chest wall: No tenderness.   Abdominal:      General: Bowel sounds are normal. There is no distension.      Palpations: Abdomen is soft. There is no mass.      Tenderness: There is no abdominal tenderness.   Musculoskeletal:         General: No tenderness. Normal range of motion.      Cervical back: Neck supple.   Skin:     General: Skin is warm and dry.      Findings: No rash.      Comments: Bilateral LE redness. R> L.    Neurological:      General: No focal deficit present.      Mental Status: He is alert and oriented to person, place, and time.   Psychiatric:         Mood and Affect: Mood normal.         Behavior: Behavior normal.         Assessment:       Problem List Items Addressed This Visit          Cardiology Problems    Carotid artery disease without cerebral infarction (Chronic)    Coronary artery disease involving native coronary artery of native heart without angina pectoris - Primary (Chronic)    Essential hypertension (Chronic)    Mixed hyperlipidemia (Chronic)    Venous insufficiency of both lower extremities (Chronic)       Other    Acquired hypothyroidism (Chronic)    BMI 45.0-49.9, adult (Chronic)    EtOH dependence (Chronic)    Hepatic steatosis (Chronic)    Lymphedema of both lower extremities (Chronic)    Prediabetes (Chronic)    Morbidly obese    Obstructive sleep apnea syndrome        Plan:       While attempting to obtain and EKG, patient became diaphoretic and had difficulty responding to questions. EKG showed ST without acute ischemic changes. Pulse ox - 91% -- O2 applied.  BP stable. He denies any CP or SOB, but does c/o some lightheadedness. Temp 102.6. EMS called and patient will be [facility] ED. Wife will follow.

## 2023-02-15 PROBLEM — L03.115 CELLULITIS OF RIGHT LOWER LEG: Status: ACTIVE | Noted: 2023-02-15

## 2023-02-15 PROBLEM — R65.20 SEVERE SEPSIS: Status: ACTIVE | Noted: 2023-02-15

## 2023-02-15 PROBLEM — F41.9 ANXIETY: Status: ACTIVE | Noted: 2023-02-15

## 2023-02-15 PROBLEM — A41.9 SEVERE SEPSIS: Status: ACTIVE | Noted: 2023-02-15

## 2023-02-15 PROBLEM — G92.9 ENCEPHALOPATHY, TOXIC: Status: ACTIVE | Noted: 2023-02-15

## 2023-02-15 PROBLEM — N17.9 AKI (ACUTE KIDNEY INJURY): Status: ACTIVE | Noted: 2023-02-15

## 2023-02-15 PROBLEM — R78.81 GRAM-POSITIVE COCCI BACTEREMIA: Status: ACTIVE | Noted: 2023-02-15

## 2023-02-15 PROBLEM — Z71.89 ACP (ADVANCE CARE PLANNING): Status: ACTIVE | Noted: 2023-02-15

## 2023-02-16 PROBLEM — I89.0 LYMPHEDEMA: Status: ACTIVE | Noted: 2023-02-16

## 2023-02-21 ENCOUNTER — PATIENT MESSAGE (OUTPATIENT)
Dept: INFECTIOUS DISEASES | Facility: CLINIC | Age: 68
End: 2023-02-21
Payer: MEDICARE

## 2023-03-01 NOTE — PROGRESS NOTES
[facility]  Infectious Disease        Patient ID: Arthur Moreno is a 67 y.o. male.    Chief Complaint: Follow-up and Leg Swelling      Arthur was seen today for follow-up and leg swelling.    Diagnoses and all orders for this visit:    Cellulitis of right lower leg    Gram-positive cocci bacteremia    Lymphedema         Greater than 30 minutes was spent on this encounter, which included: review of recent encounters, review and interpretation of labs/images, obtaining pertinent history, performing a physical examination, counseling and educating the patient/family/caregiver, ordering medications/tests, documenting in the electronic health record, and coordinating care with necessary providers.    Interval HPI:   2/15 - initial consult. Afebrile today. Patient states he feels good and has no complaints. Right lower leg noted to be erythematous and swollen with a healing lesion but he denies any pain. States this leg has off/on cellulitis due to lymphedema. Chart review says he endorsed a recent scratch to the leg which drained previously. Currently no drainage.   2/16 - afebrile with resolved leukocytosis, feeling mostly back to normal. LOYD improved. C/o increased LE swelling with the IVFs he is getting. Erythema to RLE improved, still without pain. CT neg for fluid collections. Blood cx with GBS.     3/2 - ID clinic f/u, here with his wife. Patient has completed 2 weeks of IV CTX, tolerated without issue. RLE cellulitis has resolved, now just with some dry skin. He has started going back to lymphedema clinic and they have him on a regimen for management.    Assessment and Plan:      # Strep Bacteremia  - 2/14 Blood cx: Group B Strep in 3/4 bottles  - 2/16 Blood cx: NGTD  - TTE: neg for vegetations  - most likely source is RLE cellulitis  - treated with Zosyn/Linezolid, then transitioned to IV CTX  - discharged home on Ceftriaxone 2g IV q24h for 2 weeks from date of neg culture (EOC  3/2/23)     # Right leg cellulitis  - MRSA screen: neg  - CT: diffuse soft tissue edema, no organized or drainable fluid collections. Small knee joint effusion  - clinically, knee does not appear involved     # Lymphedema  - risk for cellulitis     # Presence of right knee prosthesis     Recommendations:  - completes 2 weeks of IV CTX today for cellulitis/bacteremia due to GBS. Last dose is this evening, and plans to have infusion company pull his midline after  - ok to d/c abx  - hopefully with continued lymphedema management, he will not have recurrence of cellulitis  - However, we again reviewed that he is at risk for recurrence, and that he also risks PJI with ipsilateral prosthesis  - discussed the need for maintaining skin barrier health  - if he has another recurrence within the next 3-6 months, will consider initiating amoxicillin suppression  - f/u in ID clinic as needed. He may call us or his PCP for appt if he starts to notice cellulitis returning       Geetha Ramirez PA-C  Infectious Diseases  [facility]        HPI:      68yo male with a PMH most significant for HTN, venous insufficiency, arthritis s/p right TKA in 2017, CAD, and chronic lymphedema with recurrent cellulitis of the RLE admitted to [facility] for near syncope.      - 12/14 presented to scheduled cardiology appt where he started feeling lightheaded, diaphoretic, and febrile  - he denies any preceding symptoms in the days and weeks prior to this. Chronic back pain at baseline which is unchanged. Lymphedema of the right leg  - he was sent to the ED for his symptoms and found to be febrile to 102.7F and hypotensive with leukocytosis, elevated procal, and lactic acidosis  - blood cultures have turned positive for GPCs resembling Strep     Because of this, Infectious Diseases was consulted.    Past Medical History:   Diagnosis Date    Amaurosis fugax of right eye 4/2014    resolved    Anticoagulant long-term use     Arm  mass, left 6/20/2019    Bilateral chronic knee pain 1/10/2017    Bilateral venous insufficiency     legs    Calculus of gallbladder without mention of cholecystitis or obstruction     Cardiac arrhythmia     has implanted loop recorder, palpitations. Hx dizziness on bending over    Cellulitis and abscess of leg April 2014    treated at wound care center, resolving    Cervical spondylosis 1/17/2020    Coronary artery disease     denies chest pain, denies MI, no stents; sees Dr Rudy PARSONSID-19 10/3/22 10/3/2022    Effusion, left knee 5/23/2017    Estrogen excess 08/2017    Generalized osteoarthrosis, involving multiple sites     Hyperlipidemia     Hypertension     Hypertension 10/7/2015    Hypothyroidism     Knee pain     right far worse than left    Low back pain 8/10/2015    Lymphedema 08/01/2021    Malaise and fatigue 5/23/2017    Morbidly obese 10/12/2022    Obesity, unspecified     Occlusion and stenosis of carotid artery without mention of cerebral infarction     Primary osteoarthritis of right knee 08/2017    Sleep apnea with use of continuous positive airway pressure (CPAP)     Status post placement of implantable loop recorder     Thyroid disease     Transient arterial occlusion of retina     Transient visual loss     BOTH EYE  PER DR CAMPOVERDE  7/27/2015       Past Surgical History:   Procedure Laterality Date    BACK SURGERY N/A     decompression of L2 to L5     CARDIAC SURGERY  2013?    insertion loop recorder    CARDIAC SURGERY  2014    transesophageal echo; no clot seen, per patient    CARPAL TUNNEL RELEASE      bilateral    CHOLECYSTECTOMY      COLONOSCOPY  09/26/2016    Dr. Ryley ANDRES Right     HIATAL HERNIA REPAIR  2011    INJECTION OF ANESTHETIC AGENT AROUND MEDIAL BRANCH NERVES INNERVATING CERVICAL FACET JOINT Right 1/17/2020    Procedure: Block-nerve-medial branch-cervical C5, C6;  Surgeon: Kedar Amado MD;  Location: [facility] OR;  Service: Pain Management;  Laterality: Right;     INJECTION OF ANESTHETIC AGENT AROUND MEDIAL BRANCH NERVES INNERVATING LUMBAR FACET JOINT Bilateral 9/11/2020    Procedure: BLOCK, NERVE, FACET JOINT, LUMBAR, MEDIAL BRANCH L4 and L5;  Surgeon: Kedar Amado MD;  Location: [facility] OR;  Service: Pain Management;  Laterality: Bilateral;    injection SI joint  7/201    JOINT REPLACEMENT      right knee   august 2017    KNEE ARTHROSCOPY Bilateral     right x 2; left x 1    LAPAROSCOPIC GASTRIC BANDING      [facility]      LUMBAR EPIDURAL INJECTION      POLYPECTOMY      vocal cord    RADIOFREQUENCY ABLATION OF LUMBAR MEDIAL BRANCH NERVE AT SINGLE LEVEL Bilateral 9/23/2020    Procedure: Radiofrequency Ablation, Nerve, Spinal, Lumbar, Medial Branch L4 and L5;  Surgeon: Kedar Amado MD;  Location: [facility] OR;  Service: Pain Management;  Laterality: Bilateral;    RADIOFREQUENCY THERMAL COAGULATION OF MEDIAL BRANCH OF POSTERIOR RAMUS OF CERVICAL SPINAL NERVE Right 2/6/2020    Procedure: RADIOFREQUENCY THERMAL COAGULATION, NERVE, SPINAL, CERVICAL, POSTERIOR RAMUS, MEDIAL BRANCH C5, C6;  Surgeon: Kedar Amado MD;  Location: [facility] OR;  Service: Pain Management;  Laterality: Right;    RHIZOTOMY W/ RADIOFREQUENCY ABLATION  2016    lumbar    ROTATOR CUFF REPAIR      right    SURGICAL REMOVAL OF MASS OF UPPER EXTREMITY Left 6/20/2019    Procedure: EXCISION, MASS, UPPER EXTREMITY;  Surgeon: Max Mandel MD;  Location: [facility] OR;  Service: General;  Laterality: Left;    vocal cord surgery  1975       Family History   Problem Relation Age of Onset    Diabetes Mother     Heart disease Mother     Alzheimer's disease Mother     Diabetes Father     Heart disease Father     Arthritis Sister     Arthritis Brother     Arthritis Brother     Arthritis Brother        Social History     Socioeconomic History    Marital status:      Spouse name: Nuria    Number of children: 0   Tobacco Use    Smoking status: Never    Smokeless tobacco: Never   Substance and Sexual Activity    Alcohol  use: Yes     Comment: increased - 3 large drinks nightly    Drug use: Never    Sexual activity: Yes     Partners: Female     Birth control/protection: None     Social Determinants of Health     Financial Resource Strain: Low Risk     Difficulty of Paying Living Expenses: Not hard at all   Food Insecurity: No Food Insecurity    Worried About Running Out of Food in the Last Year: Never true    Ran Out of Food in the Last Year: Never true   Transportation Needs: No Transportation Needs    Lack of Transportation (Medical): No    Lack of Transportation (Non-Medical): No   Physical Activity: Sufficiently Active    Days of Exercise per Week: 3 days    Minutes of Exercise per Session: 60 min   Stress: No Stress Concern Present    Feeling of Stress : Only a little   Social Connections: Unknown    Frequency of Communication with Friends and Family: More than three times a week    Frequency of Social Gatherings with Friends and Family: Twice a week    Active Member of Clubs or Organizations: Yes    Attends Club or Organization Meetings: More than 4 times per year    Marital Status:    Housing Stability: Low Risk     Unable to Pay for Housing in the Last Year: No    Number of Places Lived in the Last Year: 1    Unstable Housing in the Last Year: No       Review of patient's allergies indicates:   Allergen Reactions    Lamisil [terbinafine] Rash     Emollient form only , also had Bactrim in it, not sure       Current Outpatient Medications   Medication Instructions    allopurinoL (ZYLOPRIM) 300 MG tablet TAKE 1 TABLET BY MOUTH ONCE DAILY    ALPRAZolam (XANAX) 0.25 MG tablet TAKE 1 TABLET (0.25 MG TOTAL) BY MOUTH 3 TIMES DAILY AS NEEDED FOR ANXIETY.    aspirin (ECOTRIN) 81 mg, Oral, Daily, Patient held since 6/12/16 per md instructions    atenoloL (TENORMIN) 50 mg, Oral, Daily    atorvastatin (LIPITOR) 40 mg, Oral, Nightly    b complex vitamins capsule 1 capsule, Oral, Daily    bisacodyL (DULCOLAX) 5 mg, Oral, Daily PRN     cefTRIAXone (ROCEPHIN) 2 g, Intravenous, Daily    co-enzyme Q-10 50 mg, Oral, Daily    colchicine-probenecid 0.5-500 mg (CO-BENEMID) 500-0.5 mg Tab 1 tablet, Oral, Daily    folic acid (FOLVITE) 1 mg, Oral, Daily    fosinopriL (MONOPRIL) 20 mg, Oral, Daily    heparin sod,porcine/0.9 % NaCl (HEPARIN FLUSH IV) 50 Units/mL, Intravenous, Daily    Lactobacillus rhamnosus GG (CULTURELLE) 10 billion cell capsule 1 capsule, Oral, Daily    levothyroxine (SYNTHROID) 75 mcg, Oral, Before breakfast    linaCLOtide (LINZESS) 145 mcg, Oral, Before breakfast    multivitamin (THERAGRAN) per tablet 1 tablet, Oral, Daily    OZEMPIC 1 mg, Subcutaneous, Every 7 days    pantoprazole (PROTONIX) 40 mg, Oral, Daily    potassium chloride (KLOR-CON) 10 MEQ TbSR 20 mEq, Oral, Daily    sodium chloride 0.9% (NORMAL SALINE FLUSH) injection 10 mLs, Intravenous, Daily, inject 10mL NS into midline before and after IV abx infusion and as needed to check line patency.    torsemide (DEMADEX) 20 mg, Oral, Every morning         Review of Systems   Constitutional:  Negative for activity change, appetite change, chills, fatigue and fever.   HENT:  Negative for congestion, mouth sores, sinus pain and sore throat.    Eyes:  Negative for photophobia and visual disturbance.   Respiratory:  Negative for cough, chest tightness, shortness of breath and wheezing.    Cardiovascular:  Positive for leg swelling. Negative for chest pain and palpitations.   Gastrointestinal:  Negative for abdominal pain, diarrhea, nausea and vomiting.   Genitourinary:  Negative for dysuria, flank pain, hematuria and urgency.   Musculoskeletal:  Negative for arthralgias, myalgias, neck pain and neck stiffness.   Skin:  Negative for color change and rash.   Allergic/Immunologic: Negative for immunocompromised state.   Neurological:  Negative for dizziness, seizures and headaches.   Psychiatric/Behavioral:  Negative for confusion.          Objective:     Vitals:    03/02/23 1115   BP:  116/67   Pulse: 85   Temp: 97 °F (36.1 °C)              Physical Exam  Vitals and nursing note reviewed.   Constitutional:       General: He is awake. He is not in acute distress.     Appearance: He is well-developed and well-groomed. He is obese. He is not diaphoretic.   HENT:      Head: Normocephalic and atraumatic.      Right Ear: External ear normal.      Left Ear: External ear normal.      Nose: Nose normal.   Eyes:      General: No scleral icterus.        Right eye: No discharge.         Left eye: No discharge.      Extraocular Movements: Extraocular movements intact.      Conjunctiva/sclera: Conjunctivae normal.      Pupils: Pupils are equal, round, and reactive to light.   Cardiovascular:      Rate and Rhythm: Normal rate and regular rhythm.   Pulmonary:      Effort: Pulmonary effort is normal. No accessory muscle usage or respiratory distress.      Breath sounds: Normal breath sounds.   Abdominal:      General: There is no distension.   Musculoskeletal:      Cervical back: Normal range of motion and neck supple.   Skin:     General: Skin is warm and dry.      Findings: No rash or wound.      Comments: BLEs without erythema or significant edema currently. RLE with some dry skin without wound   Neurological:      General: No focal deficit present.      Mental Status: He is alert and oriented to person, place, and time.   Psychiatric:         Mood and Affect: Mood normal.         Behavior: Behavior normal.         CrCl cannot be calculated (Patient's most recent lab result is older than the maximum 7 days allowed.).      Microbiology Results (last 7 days)       ** No results found for the last 168 hours. **              Significant Labs: All pertinent labs within the past 24 hours have been reviewed.     Significant Imaging: I have reviewed all relevant and available imaging results/findings within the past 24 hours.      Plan -- see top of note

## 2023-03-02 ENCOUNTER — OFFICE VISIT (OUTPATIENT)
Dept: INFECTIOUS DISEASES | Facility: CLINIC | Age: 68
End: 2023-03-02
Payer: MEDICARE

## 2023-03-02 ENCOUNTER — TELEPHONE (OUTPATIENT)
Dept: INFECTIOUS DISEASES | Facility: CLINIC | Age: 68
End: 2023-03-02

## 2023-03-02 VITALS
DIASTOLIC BLOOD PRESSURE: 67 MMHG | TEMPERATURE: 97 F | HEIGHT: 71 IN | SYSTOLIC BLOOD PRESSURE: 116 MMHG | HEART RATE: 85 BPM | WEIGHT: 315 LBS | BODY MASS INDEX: 44.1 KG/M2

## 2023-03-02 DIAGNOSIS — R78.81 GRAM-POSITIVE COCCI BACTEREMIA: ICD-10-CM

## 2023-03-02 DIAGNOSIS — I89.0 LYMPHEDEMA: ICD-10-CM

## 2023-03-02 DIAGNOSIS — L03.115 CELLULITIS OF RIGHT LOWER LEG: Primary | ICD-10-CM

## 2023-03-02 PROCEDURE — 4010F ACE/ARB THERAPY RXD/TAKEN: CPT | Mod: CPTII,S$GLB,, | Performed by: INTERNAL MEDICINE

## 2023-03-02 PROCEDURE — 1101F PT FALLS ASSESS-DOCD LE1/YR: CPT | Mod: CPTII,S$GLB,, | Performed by: INTERNAL MEDICINE

## 2023-03-02 PROCEDURE — 99999 PR PBB SHADOW E&M-EST. PATIENT-LVL IV: ICD-10-PCS | Mod: PBBFAC,,,

## 2023-03-02 PROCEDURE — 4010F PR ACE/ARB THEARPY RXD/TAKEN: ICD-10-PCS | Mod: CPTII,S$GLB,, | Performed by: INTERNAL MEDICINE

## 2023-03-02 PROCEDURE — 1159F PR MEDICATION LIST DOCUMENTED IN MEDICAL RECORD: ICD-10-PCS | Mod: CPTII,S$GLB,, | Performed by: INTERNAL MEDICINE

## 2023-03-02 PROCEDURE — 1101F PR PT FALLS ASSESS DOC 0-1 FALLS W/OUT INJ PAST YR: ICD-10-PCS | Mod: CPTII,S$GLB,, | Performed by: INTERNAL MEDICINE

## 2023-03-02 PROCEDURE — 3288F PR FALLS RISK ASSESSMENT DOCUMENTED: ICD-10-PCS | Mod: CPTII,S$GLB,, | Performed by: INTERNAL MEDICINE

## 2023-03-02 PROCEDURE — 99999 PR PBB SHADOW E&M-EST. PATIENT-LVL IV: CPT | Mod: PBBFAC,,,

## 2023-03-02 PROCEDURE — 99214 OFFICE O/P EST MOD 30 MIN: CPT | Mod: S$GLB,,, | Performed by: INTERNAL MEDICINE

## 2023-03-02 PROCEDURE — 3074F SYST BP LT 130 MM HG: CPT | Mod: CPTII,S$GLB,, | Performed by: INTERNAL MEDICINE

## 2023-03-02 PROCEDURE — 3044F HG A1C LEVEL LT 7.0%: CPT | Mod: CPTII,S$GLB,, | Performed by: INTERNAL MEDICINE

## 2023-03-02 PROCEDURE — 3288F FALL RISK ASSESSMENT DOCD: CPT | Mod: CPTII,S$GLB,, | Performed by: INTERNAL MEDICINE

## 2023-03-02 PROCEDURE — 1126F PR PAIN SEVERITY QUANTIFIED, NO PAIN PRESENT: ICD-10-PCS | Mod: CPTII,S$GLB,, | Performed by: INTERNAL MEDICINE

## 2023-03-02 PROCEDURE — 1126F AMNT PAIN NOTED NONE PRSNT: CPT | Mod: CPTII,S$GLB,, | Performed by: INTERNAL MEDICINE

## 2023-03-02 PROCEDURE — 3078F PR MOST RECENT DIASTOLIC BLOOD PRESSURE < 80 MM HG: ICD-10-PCS | Mod: CPTII,S$GLB,, | Performed by: INTERNAL MEDICINE

## 2023-03-02 PROCEDURE — 1111F PR DISCHARGE MEDS RECONCILED W/ CURRENT OUTPATIENT MED LIST: ICD-10-PCS | Mod: CPTII,S$GLB,, | Performed by: INTERNAL MEDICINE

## 2023-03-02 PROCEDURE — 3078F DIAST BP <80 MM HG: CPT | Mod: CPTII,S$GLB,, | Performed by: INTERNAL MEDICINE

## 2023-03-02 PROCEDURE — 3044F PR MOST RECENT HEMOGLOBIN A1C LEVEL <7.0%: ICD-10-PCS | Mod: CPTII,S$GLB,, | Performed by: INTERNAL MEDICINE

## 2023-03-02 PROCEDURE — 3008F BODY MASS INDEX DOCD: CPT | Mod: CPTII,S$GLB,, | Performed by: INTERNAL MEDICINE

## 2023-03-02 PROCEDURE — 3008F PR BODY MASS INDEX (BMI) DOCUMENTED: ICD-10-PCS | Mod: CPTII,S$GLB,, | Performed by: INTERNAL MEDICINE

## 2023-03-02 PROCEDURE — 3074F PR MOST RECENT SYSTOLIC BLOOD PRESSURE < 130 MM HG: ICD-10-PCS | Mod: CPTII,S$GLB,, | Performed by: INTERNAL MEDICINE

## 2023-03-02 PROCEDURE — 99214 PR OFFICE/OUTPT VISIT, EST, LEVL IV, 30-39 MIN: ICD-10-PCS | Mod: S$GLB,,, | Performed by: INTERNAL MEDICINE

## 2023-03-02 PROCEDURE — 1111F DSCHRG MED/CURRENT MED MERGE: CPT | Mod: CPTII,S$GLB,, | Performed by: INTERNAL MEDICINE

## 2023-03-02 PROCEDURE — 1159F MED LIST DOCD IN RCRD: CPT | Mod: CPTII,S$GLB,, | Performed by: INTERNAL MEDICINE

## 2023-03-02 NOTE — TELEPHONE ENCOUNTER
Called [facility] and gave verbal order to remove PICC line after last dose today per Geetha Ramirez, spoke to Phylicia.

## 2023-03-06 ENCOUNTER — OFFICE VISIT (OUTPATIENT)
Dept: ORTHOPEDICS | Facility: CLINIC | Age: 68
End: 2023-03-06
Payer: MEDICARE

## 2023-03-06 ENCOUNTER — HOSPITAL ENCOUNTER (OUTPATIENT)
Dept: RADIOLOGY | Facility: HOSPITAL | Age: 68
Discharge: HOME | End: 2023-03-06
Attending: ORTHOPAEDIC SURGERY
Payer: MEDICARE

## 2023-03-06 VITALS — BODY MASS INDEX: 44.1 KG/M2 | WEIGHT: 315 LBS | HEIGHT: 71 IN

## 2023-03-06 DIAGNOSIS — M17.12 OSTEOARTHRITIS OF LEFT KNEE, UNSPECIFIED OSTEOARTHRITIS TYPE: Primary | ICD-10-CM

## 2023-03-06 DIAGNOSIS — M25.562 ACUTE PAIN OF LEFT KNEE: ICD-10-CM

## 2023-03-06 DIAGNOSIS — E66.01 CLASS 3 SEVERE OBESITY DUE TO EXCESS CALORIES WITH SERIOUS COMORBIDITY AND BODY MASS INDEX (BMI) OF 40.0 TO 44.9 IN ADULT: Chronic | ICD-10-CM

## 2023-03-06 PROCEDURE — 1101F PR PT FALLS ASSESS DOC 0-1 FALLS W/OUT INJ PAST YR: ICD-10-PCS | Mod: CPTII,S$GLB,, | Performed by: ORTHOPAEDIC SURGERY

## 2023-03-06 PROCEDURE — 73562 XR KNEE ORTHO LEFT: ICD-10-PCS | Mod: 26,LT,, | Performed by: RADIOLOGY

## 2023-03-06 PROCEDURE — 3044F HG A1C LEVEL LT 7.0%: CPT | Mod: CPTII,S$GLB,, | Performed by: ORTHOPAEDIC SURGERY

## 2023-03-06 PROCEDURE — 3044F PR MOST RECENT HEMOGLOBIN A1C LEVEL <7.0%: ICD-10-PCS | Mod: CPTII,S$GLB,, | Performed by: ORTHOPAEDIC SURGERY

## 2023-03-06 PROCEDURE — 73562 X-RAY EXAM OF KNEE 3: CPT | Mod: 26,LT,, | Performed by: RADIOLOGY

## 2023-03-06 PROCEDURE — 1160F RVW MEDS BY RX/DR IN RCRD: CPT | Mod: CPTII,S$GLB,, | Performed by: ORTHOPAEDIC SURGERY

## 2023-03-06 PROCEDURE — 99214 OFFICE O/P EST MOD 30 MIN: CPT | Mod: 25,S$GLB,, | Performed by: ORTHOPAEDIC SURGERY

## 2023-03-06 PROCEDURE — 1159F MED LIST DOCD IN RCRD: CPT | Mod: CPTII,S$GLB,, | Performed by: ORTHOPAEDIC SURGERY

## 2023-03-06 PROCEDURE — 3288F PR FALLS RISK ASSESSMENT DOCUMENTED: ICD-10-PCS | Mod: CPTII,S$GLB,, | Performed by: ORTHOPAEDIC SURGERY

## 2023-03-06 PROCEDURE — 1111F PR DISCHARGE MEDS RECONCILED W/ CURRENT OUTPATIENT MED LIST: ICD-10-PCS | Mod: CPTII,S$GLB,, | Performed by: ORTHOPAEDIC SURGERY

## 2023-03-06 PROCEDURE — 1125F PR PAIN SEVERITY QUANTIFIED, PAIN PRESENT: ICD-10-PCS | Mod: CPTII,S$GLB,, | Performed by: ORTHOPAEDIC SURGERY

## 2023-03-06 PROCEDURE — 4010F ACE/ARB THERAPY RXD/TAKEN: CPT | Mod: CPTII,S$GLB,, | Performed by: ORTHOPAEDIC SURGERY

## 2023-03-06 PROCEDURE — 73560 X-RAY EXAM OF KNEE 1 OR 2: CPT | Mod: 26,RT,, | Performed by: RADIOLOGY

## 2023-03-06 PROCEDURE — 73560 XR KNEE ORTHO LEFT: ICD-10-PCS | Mod: 26,RT,, | Performed by: RADIOLOGY

## 2023-03-06 PROCEDURE — 3008F BODY MASS INDEX DOCD: CPT | Mod: CPTII,S$GLB,, | Performed by: ORTHOPAEDIC SURGERY

## 2023-03-06 PROCEDURE — 99999 PR PBB SHADOW E&M-EST. PATIENT-LVL III: ICD-10-PCS | Mod: PBBFAC,,, | Performed by: ORTHOPAEDIC SURGERY

## 2023-03-06 PROCEDURE — 1159F PR MEDICATION LIST DOCUMENTED IN MEDICAL RECORD: ICD-10-PCS | Mod: CPTII,S$GLB,, | Performed by: ORTHOPAEDIC SURGERY

## 2023-03-06 PROCEDURE — 1125F AMNT PAIN NOTED PAIN PRSNT: CPT | Mod: CPTII,S$GLB,, | Performed by: ORTHOPAEDIC SURGERY

## 2023-03-06 PROCEDURE — 3008F PR BODY MASS INDEX (BMI) DOCUMENTED: ICD-10-PCS | Mod: CPTII,S$GLB,, | Performed by: ORTHOPAEDIC SURGERY

## 2023-03-06 PROCEDURE — 4010F PR ACE/ARB THEARPY RXD/TAKEN: ICD-10-PCS | Mod: CPTII,S$GLB,, | Performed by: ORTHOPAEDIC SURGERY

## 2023-03-06 PROCEDURE — 1101F PT FALLS ASSESS-DOCD LE1/YR: CPT | Mod: CPTII,S$GLB,, | Performed by: ORTHOPAEDIC SURGERY

## 2023-03-06 PROCEDURE — 1111F DSCHRG MED/CURRENT MED MERGE: CPT | Mod: CPTII,S$GLB,, | Performed by: ORTHOPAEDIC SURGERY

## 2023-03-06 PROCEDURE — 99214 PR OFFICE/OUTPT VISIT, EST, LEVL IV, 30-39 MIN: ICD-10-PCS | Mod: 25,S$GLB,, | Performed by: ORTHOPAEDIC SURGERY

## 2023-03-06 PROCEDURE — 1160F PR REVIEW ALL MEDS BY PRESCRIBER/CLIN PHARMACIST DOCUMENTED: ICD-10-PCS | Mod: CPTII,S$GLB,, | Performed by: ORTHOPAEDIC SURGERY

## 2023-03-06 PROCEDURE — 73560 X-RAY EXAM OF KNEE 1 OR 2: CPT | Mod: 59,TC,PO,RT

## 2023-03-06 PROCEDURE — 99999 PR PBB SHADOW E&M-EST. PATIENT-LVL III: CPT | Mod: PBBFAC,,, | Performed by: ORTHOPAEDIC SURGERY

## 2023-03-06 PROCEDURE — 20610 LARGE JOINT ASPIRATION/INJECTION: L KNEE: ICD-10-PCS | Mod: LT,S$GLB,, | Performed by: ORTHOPAEDIC SURGERY

## 2023-03-06 PROCEDURE — 3288F FALL RISK ASSESSMENT DOCD: CPT | Mod: CPTII,S$GLB,, | Performed by: ORTHOPAEDIC SURGERY

## 2023-03-06 PROCEDURE — 20610 DRAIN/INJ JOINT/BURSA W/O US: CPT | Mod: LT,S$GLB,, | Performed by: ORTHOPAEDIC SURGERY

## 2023-03-06 RX ORDER — TRIAMCINOLONE ACETONIDE 40 MG/ML
40 INJECTION, SUSPENSION INTRA-ARTICULAR; INTRAMUSCULAR
Status: DISCONTINUED | OUTPATIENT
Start: 2023-03-06 | End: 2023-03-06 | Stop reason: HOSPADM

## 2023-03-06 RX ADMIN — TRIAMCINOLONE ACETONIDE 40 MG: 40 INJECTION, SUSPENSION INTRA-ARTICULAR; INTRAMUSCULAR at 11:03

## 2023-03-06 NOTE — PROCEDURES
Large Joint Aspiration/Injection: L knee    Date/Time: 3/6/2023 11:00 AM  Performed by: Elias Cerda MD  Authorized by: Elias Cerda MD     Consent Done?:  Yes (Verbal)  Timeout: prior to procedure the correct patient, procedure, and site was verified    Prep: patient was prepped and draped in usual sterile fashion      Details:  Needle Size:  21 G  Approach:  Anterolateral  Location:  Knee  Site:  L knee  Medications:  40 mg triamcinolone acetonide 40 mg/mL  Patient tolerance:  Patient tolerated the procedure well with no immediate complications

## 2023-03-06 NOTE — PROGRESS NOTES
59 years old left knee pain for about a month's time no trauma aching pain 3 on good days 6 on bad days walking standing makes it worse rest makes it better    Patient has a history of a right-sided knee replacement by myself 6 years ago with good relief     Exam shows tenderness the joint line no signs infection instability skin is intact compartments soft    X-rays show arthritic change     Assessment:  Left knee arthrosis    Plan: Kenalog injection left knee, continue with weight loss, follow-up as needed    Imaging studies ordered and reviewed by me    Further History  Aching pain  Worse with activity  Relieved with rest  No other associated symptoms  No other radiation    Further Exam  Alert and oriented  Pleasant  Contralateral limb has appropriate range of motion for age and condition  Contralateral limb has appropriate strength for age and condition  Contralateral limb has appropriate stability  for age and condition  No adenopathy  Pulses are appropriate for current condition  Skin is intact        Chief Complaint    Chief Complaint   Patient presents with    Left Knee - Pain       HPI  Arthur Moreno is a 67 y.o.  male who presents with       Past Medical History  Past Medical History:   Diagnosis Date    Amaurosis fugax of right eye 4/2014    resolved    Anticoagulant long-term use     Arm mass, left 6/20/2019    Bilateral chronic knee pain 1/10/2017    Bilateral venous insufficiency     legs    Calculus of gallbladder without mention of cholecystitis or obstruction     Cardiac arrhythmia     has implanted loop recorder, palpitations. Hx dizziness on bending over    Cellulitis and abscess of leg April 2014    treated at wound care center, resolving    Cervical spondylosis 1/17/2020    Coronary artery disease     denies chest pain, denies MI, no stents; sees Dr Rudy PARSONSID-19 10/3/22 10/3/2022    Effusion, left knee 5/23/2017    Estrogen excess 08/2017    Generalized osteoarthrosis, involving  multiple sites     Hyperlipidemia     Hypertension     Hypertension 10/7/2015    Hypothyroidism     Knee pain     right far worse than left    Low back pain 8/10/2015    Lymphedema 08/01/2021    Malaise and fatigue 5/23/2017    Morbidly obese 10/12/2022    Obesity, unspecified     Occlusion and stenosis of carotid artery without mention of cerebral infarction     Primary osteoarthritis of right knee 08/2017    Sleep apnea with use of continuous positive airway pressure (CPAP)     Status post placement of implantable loop recorder     Thyroid disease     Transient arterial occlusion of retina     Transient visual loss     BOTH EYE  PER DR CAMPOVERDE  7/27/2015       Past Surgical History  Past Surgical History:   Procedure Laterality Date    BACK SURGERY N/A     decompression of L2 to L5     CARDIAC SURGERY  2013?    insertion loop recorder    CARDIAC SURGERY  2014    transesophageal echo; no clot seen, per patient    CARPAL TUNNEL RELEASE      bilateral    CHOLECYSTECTOMY      COLONOSCOPY  09/26/2016    Dr. Ryley ANDRES Right     HIATAL HERNIA REPAIR  2011    INJECTION OF ANESTHETIC AGENT AROUND MEDIAL BRANCH NERVES INNERVATING CERVICAL FACET JOINT Right 1/17/2020    Procedure: Block-nerve-medial branch-cervical C5, C6;  Surgeon: Kedar Amado MD;  Location: [facility] OR;  Service: Pain Management;  Laterality: Right;    INJECTION OF ANESTHETIC AGENT AROUND MEDIAL BRANCH NERVES INNERVATING LUMBAR FACET JOINT Bilateral 9/11/2020    Procedure: BLOCK, NERVE, FACET JOINT, LUMBAR, MEDIAL BRANCH L4 and L5;  Surgeon: Kedar Amado MD;  Location: [facility] OR;  Service: Pain Management;  Laterality: Bilateral;    injection SI joint  7/201    JOINT REPLACEMENT      right knee   august 2017    KNEE ARTHROSCOPY Bilateral     right x 2; left x 1    LAPAROSCOPIC GASTRIC BANDING      LHC      LUMBAR EPIDURAL INJECTION      POLYPECTOMY      vocal cord    RADIOFREQUENCY ABLATION OF LUMBAR MEDIAL BRANCH NERVE AT SINGLE LEVEL  Bilateral 9/23/2020    Procedure: Radiofrequency Ablation, Nerve, Spinal, Lumbar, Medial Branch L4 and L5;  Surgeon: Kedar Amado MD;  Location: [facility] OR;  Service: Pain Management;  Laterality: Bilateral;    RADIOFREQUENCY THERMAL COAGULATION OF MEDIAL BRANCH OF POSTERIOR RAMUS OF CERVICAL SPINAL NERVE Right 2/6/2020    Procedure: RADIOFREQUENCY THERMAL COAGULATION, NERVE, SPINAL, CERVICAL, POSTERIOR RAMUS, MEDIAL BRANCH C5, C6;  Surgeon: Kedar Amado MD;  Location: [facility] OR;  Service: Pain Management;  Laterality: Right;    RHIZOTOMY W/ RADIOFREQUENCY ABLATION  2016    lumbar    ROTATOR CUFF REPAIR      right    SURGICAL REMOVAL OF MASS OF UPPER EXTREMITY Left 6/20/2019    Procedure: EXCISION, MASS, UPPER EXTREMITY;  Surgeon: Max Mandel MD;  Location: [facility] OR;  Service: General;  Laterality: Left;    vocal cord surgery  1975       Medications  Current Outpatient Medications   Medication Sig    allopurinoL (ZYLOPRIM) 300 MG tablet TAKE 1 TABLET BY MOUTH ONCE DAILY    ALPRAZolam (XANAX) 0.25 MG tablet TAKE 1 TABLET (0.25 MG TOTAL) BY MOUTH 3 TIMES DAILY AS NEEDED FOR ANXIETY.    aspirin (ECOTRIN) 81 MG EC tablet Take 81 mg by mouth once daily. Patient held since 6/12/16 per md instructions    atenoloL (TENORMIN) 50 MG tablet Take 1 tablet (50 mg total) by mouth once daily.    atorvastatin (LIPITOR) 40 MG tablet Take 1 tablet (40 mg total) by mouth nightly.    b complex vitamins capsule Take 1 capsule by mouth once daily.    co-enzyme Q-10 50 mg capsule Take 50 mg by mouth once daily.    colchicine-probenecid 0.5-500 mg (CO-BENEMID) 500-0.5 mg Tab Take 1 tablet by mouth once daily.    folic acid (FOLVITE) 1 MG tablet Take 1 mg by mouth once daily.    fosinopriL (MONOPRIL) 20 MG tablet Take 1 tablet (20 mg total) by mouth once daily.    heparin sod,porcine/0.9 % NaCl (HEPARIN FLUSH IV) Inject 50 Units/mL into the vein Daily. Indications: midline patency    Lactobacillus rhamnosus GG (CULTURELLE) 10  billion cell capsule Take 1 capsule by mouth once daily.    levothyroxine (SYNTHROID) 75 MCG tablet Take 1 tablet (75 mcg total) by mouth before breakfast.    linaCLOtide (LINZESS) 145 mcg Cap capsule Take 1 capsule (145 mcg total) by mouth before breakfast.    multivitamin (THERAGRAN) per tablet Take 1 tablet by mouth once daily.    pantoprazole (PROTONIX) 40 MG tablet Take 1 tablet (40 mg total) by mouth once daily.    potassium chloride (KLOR-CON) 10 MEQ TbSR Take 2 tablets (20 mEq total) by mouth once daily.    semaglutide (OZEMPIC) 1 mg/dose (4 mg/3 mL) Inject 1 mg into the skin every 7 days.    sodium chloride 0.9% (NORMAL SALINE FLUSH) injection Inject 10 mLs into the vein Daily. inject 10mL NS into midline before and after IV abx infusion and as needed to check line patency.  Indications: midline patency    torsemide (DEMADEX) 20 MG Tab Take 1 tablet (20 mg total) by mouth every morning.    bisacodyL (DULCOLAX) 5 mg EC tablet Take 1 tablet (5 mg total) by mouth daily as needed for Constipation. (Patient not taking: Reported on 3/6/2023)     No current facility-administered medications for this visit.       Allergies  Review of patient's allergies indicates:   Allergen Reactions    Lamisil [terbinafine] Rash     Emollient form only , also had Bactrim in it, not sure       Family History  Family History   Problem Relation Age of Onset    Diabetes Mother     Heart disease Mother     Alzheimer's disease Mother     Diabetes Father     Heart disease Father     Arthritis Sister     Arthritis Brother     Arthritis Brother     Arthritis Brother        Social History  Social History     Socioeconomic History    Marital status:      Spouse name: Nuria    Number of children: 0   Tobacco Use    Smoking status: Never    Smokeless tobacco: Never   Substance and Sexual Activity    Alcohol use: Yes     Comment: increased - 3 large drinks nightly    Drug use: Never    Sexual activity: Yes     Partners: Female     Birth  control/protection: None     Social Determinants of Health     Financial Resource Strain: Low Risk     Difficulty of Paying Living Expenses: Not hard at all   Food Insecurity: No Food Insecurity    Worried About Running Out of Food in the Last Year: Never true    Ran Out of Food in the Last Year: Never true   Transportation Needs: No Transportation Needs    Lack of Transportation (Medical): No    Lack of Transportation (Non-Medical): No   Physical Activity: Sufficiently Active    Days of Exercise per Week: 3 days    Minutes of Exercise per Session: 60 min   Stress: No Stress Concern Present    Feeling of Stress : Only a little   Social Connections: Unknown    Frequency of Communication with Friends and Family: More than three times a week    Frequency of Social Gatherings with Friends and Family: Twice a week    Active Member of Clubs or Organizations: Yes    Attends Club or Organization Meetings: More than 4 times per year    Marital Status:    Housing Stability: Low Risk     Unable to Pay for Housing in the Last Year: No    Number of Places Lived in the Last Year: 1    Unstable Housing in the Last Year: No               Review of Systems     Constitutional: Negative    HENT: Negative  Eyes: Negative  Respiratory: Negative  Cardiovascular: Negative  Musculoskeletal: HPI  Skin: Negative  Neurological: Negative  Hematological: Negative  Endocrine: Negative                 Physical Exam    There were no vitals filed for this visit.  Body mass index is 45.33 kg/m².  Physical Examination:     General appearance -  well appearing, and in no distress  Mental status - awake  Neck - supple  Chest -  symmetric air entry  Heart - normal rate   Abdomen - soft      Assessment     1. Acute pain of left knee    2. Osteoarthritis of left knee, unspecified osteoarthritis type          Plan

## 2023-05-22 PROBLEM — R65.20 SEVERE SEPSIS: Status: RESOLVED | Noted: 2023-02-15 | Resolved: 2023-05-22

## 2023-05-22 PROBLEM — A41.9 SEVERE SEPSIS: Status: RESOLVED | Noted: 2023-02-15 | Resolved: 2023-05-22

## 2023-05-22 PROBLEM — N17.9 AKI (ACUTE KIDNEY INJURY): Status: RESOLVED | Noted: 2023-02-15 | Resolved: 2023-05-22

## 2023-05-29 ENCOUNTER — OFFICE VISIT (OUTPATIENT)
Dept: PAIN MEDICINE | Facility: CLINIC | Age: 68
End: 2023-05-29
Payer: MEDICARE

## 2023-05-29 VITALS
HEIGHT: 71 IN | HEART RATE: 71 BPM | SYSTOLIC BLOOD PRESSURE: 137 MMHG | DIASTOLIC BLOOD PRESSURE: 62 MMHG | WEIGHT: 315 LBS | BODY MASS INDEX: 44.1 KG/M2

## 2023-05-29 DIAGNOSIS — M47.816 LUMBAR SPONDYLOSIS: ICD-10-CM

## 2023-05-29 DIAGNOSIS — M51.36 DDD (DEGENERATIVE DISC DISEASE), LUMBAR: Primary | ICD-10-CM

## 2023-05-29 PROCEDURE — 3044F PR MOST RECENT HEMOGLOBIN A1C LEVEL <7.0%: ICD-10-PCS | Mod: CPTII,S$GLB,, | Performed by: PHYSICIAN ASSISTANT

## 2023-05-29 PROCEDURE — 99213 PR OFFICE/OUTPT VISIT, EST, LEVL III, 20-29 MIN: ICD-10-PCS | Mod: S$GLB,,, | Performed by: PHYSICIAN ASSISTANT

## 2023-05-29 PROCEDURE — 3288F PR FALLS RISK ASSESSMENT DOCUMENTED: ICD-10-PCS | Mod: CPTII,S$GLB,, | Performed by: PHYSICIAN ASSISTANT

## 2023-05-29 PROCEDURE — 1101F PT FALLS ASSESS-DOCD LE1/YR: CPT | Mod: CPTII,S$GLB,, | Performed by: PHYSICIAN ASSISTANT

## 2023-05-29 PROCEDURE — 99999 PR PBB SHADOW E&M-EST. PATIENT-LVL IV: ICD-10-PCS | Mod: PBBFAC,,, | Performed by: PHYSICIAN ASSISTANT

## 2023-05-29 PROCEDURE — 3078F DIAST BP <80 MM HG: CPT | Mod: CPTII,S$GLB,, | Performed by: PHYSICIAN ASSISTANT

## 2023-05-29 PROCEDURE — 1125F AMNT PAIN NOTED PAIN PRSNT: CPT | Mod: CPTII,S$GLB,, | Performed by: PHYSICIAN ASSISTANT

## 2023-05-29 PROCEDURE — 4010F PR ACE/ARB THEARPY RXD/TAKEN: ICD-10-PCS | Mod: CPTII,S$GLB,, | Performed by: PHYSICIAN ASSISTANT

## 2023-05-29 PROCEDURE — 3008F BODY MASS INDEX DOCD: CPT | Mod: CPTII,S$GLB,, | Performed by: PHYSICIAN ASSISTANT

## 2023-05-29 PROCEDURE — 99213 OFFICE O/P EST LOW 20 MIN: CPT | Mod: S$GLB,,, | Performed by: PHYSICIAN ASSISTANT

## 2023-05-29 PROCEDURE — 3075F SYST BP GE 130 - 139MM HG: CPT | Mod: CPTII,S$GLB,, | Performed by: PHYSICIAN ASSISTANT

## 2023-05-29 PROCEDURE — 3078F PR MOST RECENT DIASTOLIC BLOOD PRESSURE < 80 MM HG: ICD-10-PCS | Mod: CPTII,S$GLB,, | Performed by: PHYSICIAN ASSISTANT

## 2023-05-29 PROCEDURE — 3288F FALL RISK ASSESSMENT DOCD: CPT | Mod: CPTII,S$GLB,, | Performed by: PHYSICIAN ASSISTANT

## 2023-05-29 PROCEDURE — 1101F PR PT FALLS ASSESS DOC 0-1 FALLS W/OUT INJ PAST YR: ICD-10-PCS | Mod: CPTII,S$GLB,, | Performed by: PHYSICIAN ASSISTANT

## 2023-05-29 PROCEDURE — 3008F PR BODY MASS INDEX (BMI) DOCUMENTED: ICD-10-PCS | Mod: CPTII,S$GLB,, | Performed by: PHYSICIAN ASSISTANT

## 2023-05-29 PROCEDURE — 99999 PR PBB SHADOW E&M-EST. PATIENT-LVL IV: CPT | Mod: PBBFAC,,, | Performed by: PHYSICIAN ASSISTANT

## 2023-05-29 PROCEDURE — 4010F ACE/ARB THERAPY RXD/TAKEN: CPT | Mod: CPTII,S$GLB,, | Performed by: PHYSICIAN ASSISTANT

## 2023-05-29 PROCEDURE — 3044F HG A1C LEVEL LT 7.0%: CPT | Mod: CPTII,S$GLB,, | Performed by: PHYSICIAN ASSISTANT

## 2023-05-29 PROCEDURE — 1125F PR PAIN SEVERITY QUANTIFIED, PAIN PRESENT: ICD-10-PCS | Mod: CPTII,S$GLB,, | Performed by: PHYSICIAN ASSISTANT

## 2023-05-29 PROCEDURE — 3075F PR MOST RECENT SYSTOLIC BLOOD PRESS GE 130-139MM HG: ICD-10-PCS | Mod: CPTII,S$GLB,, | Performed by: PHYSICIAN ASSISTANT

## 2023-05-29 RX ORDER — METHYLPREDNISOLONE 4 MG/1
TABLET ORAL
Qty: 21 EACH | Refills: 0 | Status: SHIPPED | OUTPATIENT
Start: 2023-05-29 | End: 2023-06-19

## 2023-05-29 RX ORDER — METHOCARBAMOL 750 MG/1
750 TABLET, FILM COATED ORAL 2 TIMES DAILY PRN
Qty: 60 TABLET | Refills: 0 | Status: ON HOLD | OUTPATIENT
Start: 2023-05-29 | End: 2023-07-11 | Stop reason: HOSPADM

## 2023-05-31 NOTE — PROGRESS NOTES
This note was completed with dictation software and grammatical errors may exist.    CC:Neck pain, back pain    HPI:  The patient is a 68-year-old man with a history of hypertension, CAD, loop recorder, venous insufficiency who presents in referral from Dr. Crawford for neck pain.  He returns in follow-up today with severe bilateral low back pain radiating to the buttocks.  He is going on an [employer] cruise in 10 days and is concerned about the amount of pain he is having.  His pain is worse with standing walking, improved with sitting and at night.  He denies weakness, numbness, bladder or bowel incontinence.    Previous history:  The patient is a 64-year-old man with a history of CAD on aspirin, obesity, lumbar stenosis status post surgery, gout who presents in referral from Dr. Crawford for right-sided neck pain. Patient denies any specific trauma, reports having no longstanding history of neck pain.  However about 18 months ago he began having discomfort in the right side of his neck.  It is worse with turning his head side to side especially with right lateral rotation.  It stays in the right neck and radiates up to the occiput but denies any radiation into the parietal region, no headaches with involved with this.  He denies any major radiation into the arms, no numbness or tingling, no weakness in his arms.  He denies any balance issues or dexterity issues.  He denies any major symptoms on the left side.  He describes the pain as aching and dull and sharp, worse with flexion and extension of the neck and lateral rotation, does get some relief with lying down.    Pain intervention history:  He has been doing physical therapy for his neck, has not had much benefit with this.  He does have a history of lumbar stenosis, had undergone epidural steroid injections and radiofrequency ablation but eventually the pain worsened and he underwent surgery by Dr. Johnson.  He is status post L2-L5 decompression in 2016. He is status  post right C5 and C6 medial branch block on 01/17/2020 with greater than 80% relief and then radiofrequency ablation procedure on 02/06/2020 with what he describes as 50% relief of his neck pain.   He is status post bilateral L4 and L5 medial branch radiofrequency ablation on 09/23/2020 with 0% relief.     Spine surgeries:    Antineuropathics:  NSAIDs:  Physical therapy:  Antidepressants:  Muscle relaxers:  Opioids:  Antiplatelets/Anticoagulants:    ROS:  He reports easy bruising and back pain.  Balance of review of systems is negative.    Past Medical History:   Diagnosis Date    Amaurosis fugax of right eye 4/2014    resolved    Anticoagulant long-term use     Arm mass, left 6/20/2019    Bilateral chronic knee pain 1/10/2017    Bilateral venous insufficiency     legs    Calculus of gallbladder without mention of cholecystitis or obstruction     Cardiac arrhythmia     has implanted loop recorder, palpitations. Hx dizziness on bending over    Cellulitis and abscess of leg April 2014    treated at wound care center, resolving    Cervical spondylosis 1/17/2020    Coronary artery disease     denies chest pain, denies MI, no stents; sees Dr Grant    COVID-19 10/3/22 10/3/2022    Effusion, left knee 5/23/2017    Estrogen excess 08/2017    Generalized osteoarthrosis, involving multiple sites     Hyperlipidemia     Hypertension     Hypertension 10/7/2015    Hypothyroidism     Knee pain     right far worse than left    Low back pain 8/10/2015    Lymphedema 08/01/2021    Malaise and fatigue 5/23/2017    Morbidly obese 10/12/2022    Obesity, unspecified     Occlusion and stenosis of carotid artery without mention of cerebral infarction     Primary osteoarthritis of right knee 08/2017    Sleep apnea with use of continuous positive airway pressure (CPAP)     Status post placement of implantable loop recorder     Thyroid disease     Transient arterial occlusion of retina     Transient visual loss     BOTH EYE  PER   NIRALI  7/27/2015       Past Surgical History:   Procedure Laterality Date    BACK SURGERY N/A     decompression of L2 to L5     CARDIAC SURGERY  2013?    insertion loop recorder    CARDIAC SURGERY  2014    transesophageal echo; no clot seen, per patient    CARPAL TUNNEL RELEASE      bilateral    CHOLECYSTECTOMY      COLONOSCOPY  09/26/2016    Dr. Ryley ANDRES Right     HIATAL HERNIA REPAIR  2011    INJECTION OF ANESTHETIC AGENT AROUND MEDIAL BRANCH NERVES INNERVATING CERVICAL FACET JOINT Right 1/17/2020    Procedure: Block-nerve-medial branch-cervical C5, C6;  Surgeon: Kedar Amado MD;  Location: [facility] OR;  Service: Pain Management;  Laterality: Right;    INJECTION OF ANESTHETIC AGENT AROUND MEDIAL BRANCH NERVES INNERVATING LUMBAR FACET JOINT Bilateral 9/11/2020    Procedure: BLOCK, NERVE, FACET JOINT, LUMBAR, MEDIAL BRANCH L4 and L5;  Surgeon: Kedar Amado MD;  Location: [facility] OR;  Service: Pain Management;  Laterality: Bilateral;    injection SI joint  7/201    JOINT REPLACEMENT      right knee   august 2017    KNEE ARTHROSCOPY Bilateral     right x 2; left x 1    LAPAROSCOPIC GASTRIC BANDING      C      LUMBAR EPIDURAL INJECTION      POLYPECTOMY      vocal cord    RADIOFREQUENCY ABLATION OF LUMBAR MEDIAL BRANCH NERVE AT SINGLE LEVEL Bilateral 9/23/2020    Procedure: Radiofrequency Ablation, Nerve, Spinal, Lumbar, Medial Branch L4 and L5;  Surgeon: Kedar Amado MD;  Location: [facility] OR;  Service: Pain Management;  Laterality: Bilateral;    RADIOFREQUENCY THERMAL COAGULATION OF MEDIAL BRANCH OF POSTERIOR RAMUS OF CERVICAL SPINAL NERVE Right 2/6/2020    Procedure: RADIOFREQUENCY THERMAL COAGULATION, NERVE, SPINAL, CERVICAL, POSTERIOR RAMUS, MEDIAL BRANCH C5, C6;  Surgeon: Kedar Amado MD;  Location: [facility] OR;  Service: Pain Management;  Laterality: Right;    RHIZOTOMY W/ RADIOFREQUENCY ABLATION  2016    lumbar    ROTATOR CUFF REPAIR      right    SURGICAL REMOVAL OF MASS OF UPPER  "EXTREMITY Left 6/20/2019    Procedure: EXCISION, MASS, UPPER EXTREMITY;  Surgeon: Max Mandel MD;  Location: [facility] OR;  Service: General;  Laterality: Left;    vocal cord surgery  1975       Social History     Socioeconomic History    Marital status:      Spouse name: Nuria    Number of children: 0   Tobacco Use    Smoking status: Never    Smokeless tobacco: Never   Substance and Sexual Activity    Alcohol use: Yes     Comment: increased - 3 large drinks nightly    Drug use: Never    Sexual activity: Yes     Partners: Female     Birth control/protection: None     Social Determinants of Health     Financial Resource Strain: Low Risk     Difficulty of Paying Living Expenses: Not hard at all   Food Insecurity: No Food Insecurity    Worried About Running Out of Food in the Last Year: Never true    Ran Out of Food in the Last Year: Never true   Transportation Needs: No Transportation Needs    Lack of Transportation (Medical): No    Lack of Transportation (Non-Medical): No   Physical Activity: Sufficiently Active    Days of Exercise per Week: 3 days    Minutes of Exercise per Session: 60 min   Stress: No Stress Concern Present    Feeling of Stress : Only a little   Social Connections: Unknown    Frequency of Communication with Friends and Family: More than three times a week    Frequency of Social Gatherings with Friends and Family: Twice a week    Active Member of Clubs or Organizations: Yes    Attends Club or Organization Meetings: More than 4 times per year    Marital Status:    Housing Stability: Low Risk     Unable to Pay for Housing in the Last Year: No    Number of Places Lived in the Last Year: 1    Unstable Housing in the Last Year: No         Medications/Allergies: See med card    Vitals:    05/29/23 1343   BP: 137/62   Pulse: 71   Weight: (!) 145.2 kg (320 lb 1.7 oz)   Height: 5' 11" (1.803 m)   PainSc:   5   PainLoc: Back         Physical exam:  Gen: A and O x3, pleasant, well-groomed  Skin: " No rashes or obvious lesions  HEENT: PERRLA, no obvious deformities on ears or in canals.Trachea midline.  CVS: Regular rate and rhythm, normal palpable pulses.  Resp: Clear to auscultation bilaterally, no wheezes or rales.  Abdomen: Soft, NT/ND.  Musculoskeletal: Able to heel walk, toe walk. No antalgic gait.     Neuro:  Upper extremities: 5/5 strength bilaterally   Lower extremities: 5/5 strength bilaterally  Reflexes: Brachioradialis 1+, Bicep 1+, Tricep 1+. Patellar 0+, Achilles 0+ bilaterally.  Sensory: Intact and symmetrical to light touch and pinprick in C2-T1 dermatomes bilaterally. Intact and symmetrical to light touch and pinprick in L2-S1 dermatomes bilaterally.    Lumbar spine:  Lumbar spine:  Range of motion is mildly reduced with flexion and severely reduced with extension with increased bilateral low back pain during each maneuver.  Oblique extension causes pain on the corresponding side.  Wilton's test causes no increased pain on either side.    Supine straight leg raise is negative bilaterally.    Internal and external rotation of the hip causes no increased pain on either side.  Myofascial exam: No tenderness to palpation across lumbar paraspinous muscles.      Imagin19 MRI C-spine:  C2-C3: Small central disc osteophyte complex.  Minimal left facet hypertrophy.  No significant spinal canal or foraminal stenosis.  C3-C4: Mild disc osteophyte complex with possible tiny central soft disc extrusion.  Moderate right and minimal left facet hypertrophy.  Slight ventral cord flattening with thin sliver preserved ventral and preserved dorsal CSF.  Moderate-severe right and mild-moderate left foraminal stenosis.  C4-C5: Mild disc osteophyte complex with prominent central component likely small central disc extrusion..  Mild-moderate bilateral facet hypertrophy.  Ligamentum flavum thickening.  Mild ventral cord flattening with thin sliver preserved ventral and preserved dorsal CSF.  Mild-moderate  bilateral foraminal stenosis.  C5-C6: Moderate disc osteophyte complex with likely small central disc extrusion.  Mild bilateral facet hypertrophy.  Ligamentum flavum thickening.  Moderate cord flattening with effacement of ventral and thin sliver preserved dorsal CSF.  Moderate bilateral foraminal stenosis.  C6-C7: Mild disc osteophyte complex with likely small central disc extrusion.  Preserved ventral and dorsal CSF.  Moderate bilateral foraminal stenosis.  C7-T1: Minimal disc osteophyte complex.  Mild left facet hypertrophy.  No significant spinal canal or foraminal stenosis.    4/15/19 EMG lower ext:   1. Subacute on chronic, moderate to severe, right lumbosacral polyradiculopathy most prominent at the L5 nerve root, with slight active denervation in the L4, L5, and S1 territories.  2. Subacute on chronic, moderate to severe, left lumbosacral polyradiculopathy most prominent of the L5-S1 level, with slight active denervation noted at the L4 level.  3. The symmetric absence of the sural sensory response can be seen in this age group.  The absence of the superficial peroneal sensory response is likely secondary to a more proximal dorsal root ganglion in the setting of L4/5 radiculopathy.There is no convincing evidence of a peripheral neuropathy, focal neuropathy or plexopathy on this study.  In addition, no myopathic changes were noted.    4/2/19 MRI L-spine:  T12-L1: Mild disc bulge.  Minimal bilateral facet hypertrophy.  Mild narrowing of the right greater than left lateral recess.  No significant overall spinal canal stenosis.  Mild right and minimal left foraminal stenosis.    L1-L2: Mild retrolisthesis.  Mild disc bulge and posterior osteophytic ridging.  Likely small superimposed central/right lateral recess protrusion.  Mild-moderate right facet hypertrophy.  Left facets obscured by artifact.  Mild-moderate narrowing of the bilateral lateral recesses.  Mild overall spinal canal stenosis.  Mild-moderate  right and mild left foraminal stenosis.   L2-L3: Left hemilaminectomy.  Mild retrolisthesis.  Spinal canal is patent.  Moderate left and mild-moderate right foraminal stenosis.   L3-L4: Fused.  Mild disc bulge.  Partially obscured bilateral facet hypertrophy, likely moderate on the right greater than left.  Minimal narrowing of bilateral lateral recesses.  No significant overall spinal canal stenosis.  Mild right and minimal left foraminal stenosis.   L4-L5: Fused.  Laminectomy.  Spinal canal is widely patent.  Mild-moderate bilateral foraminal stenosis.   BONES: L2-5 posterior fusion with bilateral pedicle screws and posterior rods with L4 laminectomy and crosslink device at the L4 level.  Left L2-3 hemilaminectomy.  Type 1 endplate changes at L4-5.  No aggressive bone marrow signal.   PARASPINAL AREA: STIR signal hyperintensity in the bilateral dorsal paraspinal muscles from the L3-L5 levels which may represent edema or denervation change.  Bilateral dorsal paraspinal muscular atrophy.  No large fluid collection.      Assessment:   The patient is a 68-year-old man with a history of hypertension, CAD, loop recorder, venous insufficiency who presents in referral from Dr. Crawford for neck pain.    1. DDD (degenerative disc disease), lumbar  MRI Lumbar Spine Without Contrast      2. Lumbar spondylosis            Plan:  1. It has been over 4 years since he had an MRI and due to his severe pain I am going to update this for further evaluation.  2. I provided a Medrol Dosepak and prescription for methocarbamol so he can hopefully get some relief prior to his cruise.    3. He will follow-up after his vacation to review images and discuss recommendations.

## 2023-06-01 ENCOUNTER — OFFICE VISIT (OUTPATIENT)
Dept: DERMATOLOGY | Facility: CLINIC | Age: 68
End: 2023-06-01
Payer: MEDICARE

## 2023-06-01 DIAGNOSIS — Z12.83 SKIN CANCER SCREENING: ICD-10-CM

## 2023-06-01 DIAGNOSIS — L82.1 SEBORRHEIC KERATOSES: Primary | ICD-10-CM

## 2023-06-01 PROCEDURE — 99213 OFFICE O/P EST LOW 20 MIN: CPT | Mod: S$GLB,,, | Performed by: DERMATOLOGY

## 2023-06-01 PROCEDURE — 3288F FALL RISK ASSESSMENT DOCD: CPT | Mod: CPTII,S$GLB,, | Performed by: DERMATOLOGY

## 2023-06-01 PROCEDURE — 99999 PR PBB SHADOW E&M-EST. PATIENT-LVL III: CPT | Mod: PBBFAC,,, | Performed by: DERMATOLOGY

## 2023-06-01 PROCEDURE — 4010F ACE/ARB THERAPY RXD/TAKEN: CPT | Mod: CPTII,S$GLB,, | Performed by: DERMATOLOGY

## 2023-06-01 PROCEDURE — 1101F PT FALLS ASSESS-DOCD LE1/YR: CPT | Mod: CPTII,S$GLB,, | Performed by: DERMATOLOGY

## 2023-06-01 PROCEDURE — 99999 PR PBB SHADOW E&M-EST. PATIENT-LVL III: ICD-10-PCS | Mod: PBBFAC,,, | Performed by: DERMATOLOGY

## 2023-06-01 PROCEDURE — 3044F HG A1C LEVEL LT 7.0%: CPT | Mod: CPTII,S$GLB,, | Performed by: DERMATOLOGY

## 2023-06-01 PROCEDURE — 3044F PR MOST RECENT HEMOGLOBIN A1C LEVEL <7.0%: ICD-10-PCS | Mod: CPTII,S$GLB,, | Performed by: DERMATOLOGY

## 2023-06-01 PROCEDURE — 1160F PR REVIEW ALL MEDS BY PRESCRIBER/CLIN PHARMACIST DOCUMENTED: ICD-10-PCS | Mod: CPTII,S$GLB,, | Performed by: DERMATOLOGY

## 2023-06-01 PROCEDURE — 1160F RVW MEDS BY RX/DR IN RCRD: CPT | Mod: CPTII,S$GLB,, | Performed by: DERMATOLOGY

## 2023-06-01 PROCEDURE — 1159F PR MEDICATION LIST DOCUMENTED IN MEDICAL RECORD: ICD-10-PCS | Mod: CPTII,S$GLB,, | Performed by: DERMATOLOGY

## 2023-06-01 PROCEDURE — 1101F PR PT FALLS ASSESS DOC 0-1 FALLS W/OUT INJ PAST YR: ICD-10-PCS | Mod: CPTII,S$GLB,, | Performed by: DERMATOLOGY

## 2023-06-01 PROCEDURE — 4010F PR ACE/ARB THEARPY RXD/TAKEN: ICD-10-PCS | Mod: CPTII,S$GLB,, | Performed by: DERMATOLOGY

## 2023-06-01 PROCEDURE — 3288F PR FALLS RISK ASSESSMENT DOCUMENTED: ICD-10-PCS | Mod: CPTII,S$GLB,, | Performed by: DERMATOLOGY

## 2023-06-01 PROCEDURE — 99213 PR OFFICE/OUTPT VISIT, EST, LEVL III, 20-29 MIN: ICD-10-PCS | Mod: S$GLB,,, | Performed by: DERMATOLOGY

## 2023-06-01 PROCEDURE — 1159F MED LIST DOCD IN RCRD: CPT | Mod: CPTII,S$GLB,, | Performed by: DERMATOLOGY

## 2023-06-01 NOTE — PROGRESS NOTES
Subjective:      Patient ID:  Arthur Moreno is a 68 y.o. male who presents for No chief complaint on file.    hospitals  Established patient  Here for [facility].   LOV 10/2021 with Dr. Gastelum  No chief complaint today.  No personal history of skin cancer  No family history of skin cancer.  Previously with itching on back, resolved.   No concerning lesions today       Review of Systems   Skin:  Negative for itching, rash, dry skin, dry lips and wears hat.     Objective:   Physical Exam   Constitutional: He appears well-developed and well-nourished. No distress.   Eyes: Lids are normal.  No conjunctival no injection.   Neurological: He is alert and oriented to person, place, and time. He is not disoriented.   Psychiatric: He has a normal mood and affect.   Skin:   Areas Examined (abnormalities noted in diagram):   Scalp / Hair Palpated and Inspected  Head / Face Inspection Performed  Neck Inspection Performed  Chest / Axilla Inspection Performed  Abdomen Inspection Performed  Genitals / Buttocks / Groin Inspection Performed  Back Inspection Performed  RUE Inspected  LUE Inspection Performed  Nails and Digits Inspection Performed               Diagram Legend     Erythematous scaling macule/papule c/w actinic keratosis       Vascular papule c/w angioma      Pigmented verrucoid papule/plaque c/w seborrheic keratosis      Yellow umbilicated papule c/w sebaceous hyperplasia      Irregularly shaped tan macule c/w lentigo     1-2 mm smooth white papules consistent with Milia      Movable subcutaneous cyst with punctum c/w epidermal inclusion cyst      Subcutaneous movable cyst c/w pilar cyst      Firm pink to brown papule c/w dermatofibroma      Pedunculated fleshy papule(s) c/w skin tag(s)      Evenly pigmented macule c/w junctional nevus     Mildly variegated pigmented, slightly irregular-bordered macule c/w mildly atypical nevus      Flesh colored to evenly pigmented papule c/w intradermal nevus       Pink pearly  papule/plaque c/w basal cell carcinoma      Erythematous hyperkeratotic cursted plaque c/w SCC      Surgical scar with no sign of skin cancer recurrence      Open and closed comedones      Inflammatory papules and pustules      Verrucoid papule consistent consistent with wart     Erythematous eczematous patches and plaques     Dystrophic onycholytic nail with subungual debris c/w onychomycosis     Umbilicated papule    Erythematous-base heme-crusted tan verrucoid plaque consistent with inflamed seborrheic keratosis     Erythematous Silvery Scaling Plaque c/w Psoriasis     See annotation      Assessment / Plan:        Seborrheic keratoses  Back  These are benign inherited growths without a malignant potential. Reassurance given to patient. No treatment is necessary.       Skin cancer screening  Upper body skin examination performed today including at least 6 points as noted in physical examination. No lesions suspicious for malignancy noted.               No follow-ups on file.

## 2023-06-28 ENCOUNTER — OFFICE VISIT (OUTPATIENT)
Dept: PAIN MEDICINE | Facility: CLINIC | Age: 68
End: 2023-06-28
Payer: MEDICARE

## 2023-06-28 VITALS
WEIGHT: 315 LBS | DIASTOLIC BLOOD PRESSURE: 62 MMHG | BODY MASS INDEX: 44.1 KG/M2 | HEART RATE: 77 BPM | HEIGHT: 71 IN | SYSTOLIC BLOOD PRESSURE: 135 MMHG

## 2023-06-28 DIAGNOSIS — M47.816 LUMBAR SPONDYLOSIS: ICD-10-CM

## 2023-06-28 DIAGNOSIS — M51.36 DDD (DEGENERATIVE DISC DISEASE), LUMBAR: ICD-10-CM

## 2023-06-28 DIAGNOSIS — M54.16 LUMBAR RADICULOPATHY: Primary | ICD-10-CM

## 2023-06-28 PROCEDURE — 3288F PR FALLS RISK ASSESSMENT DOCUMENTED: ICD-10-PCS | Mod: CPTII,S$GLB,, | Performed by: PHYSICIAN ASSISTANT

## 2023-06-28 PROCEDURE — 1101F PT FALLS ASSESS-DOCD LE1/YR: CPT | Mod: CPTII,S$GLB,, | Performed by: PHYSICIAN ASSISTANT

## 2023-06-28 PROCEDURE — 3008F BODY MASS INDEX DOCD: CPT | Mod: CPTII,S$GLB,, | Performed by: PHYSICIAN ASSISTANT

## 2023-06-28 PROCEDURE — 3008F PR BODY MASS INDEX (BMI) DOCUMENTED: ICD-10-PCS | Mod: CPTII,S$GLB,, | Performed by: PHYSICIAN ASSISTANT

## 2023-06-28 PROCEDURE — 3075F SYST BP GE 130 - 139MM HG: CPT | Mod: CPTII,S$GLB,, | Performed by: PHYSICIAN ASSISTANT

## 2023-06-28 PROCEDURE — 99999 PR PBB SHADOW E&M-EST. PATIENT-LVL V: ICD-10-PCS | Mod: PBBFAC,,, | Performed by: PHYSICIAN ASSISTANT

## 2023-06-28 PROCEDURE — 4010F ACE/ARB THERAPY RXD/TAKEN: CPT | Mod: CPTII,S$GLB,, | Performed by: PHYSICIAN ASSISTANT

## 2023-06-28 PROCEDURE — 1159F MED LIST DOCD IN RCRD: CPT | Mod: CPTII,S$GLB,, | Performed by: PHYSICIAN ASSISTANT

## 2023-06-28 PROCEDURE — 3044F PR MOST RECENT HEMOGLOBIN A1C LEVEL <7.0%: ICD-10-PCS | Mod: CPTII,S$GLB,, | Performed by: PHYSICIAN ASSISTANT

## 2023-06-28 PROCEDURE — 3078F PR MOST RECENT DIASTOLIC BLOOD PRESSURE < 80 MM HG: ICD-10-PCS | Mod: CPTII,S$GLB,, | Performed by: PHYSICIAN ASSISTANT

## 2023-06-28 PROCEDURE — 3044F HG A1C LEVEL LT 7.0%: CPT | Mod: CPTII,S$GLB,, | Performed by: PHYSICIAN ASSISTANT

## 2023-06-28 PROCEDURE — 4010F PR ACE/ARB THEARPY RXD/TAKEN: ICD-10-PCS | Mod: CPTII,S$GLB,, | Performed by: PHYSICIAN ASSISTANT

## 2023-06-28 PROCEDURE — 1159F PR MEDICATION LIST DOCUMENTED IN MEDICAL RECORD: ICD-10-PCS | Mod: CPTII,S$GLB,, | Performed by: PHYSICIAN ASSISTANT

## 2023-06-28 PROCEDURE — 99999 PR PBB SHADOW E&M-EST. PATIENT-LVL V: CPT | Mod: PBBFAC,,, | Performed by: PHYSICIAN ASSISTANT

## 2023-06-28 PROCEDURE — 3075F PR MOST RECENT SYSTOLIC BLOOD PRESS GE 130-139MM HG: ICD-10-PCS | Mod: CPTII,S$GLB,, | Performed by: PHYSICIAN ASSISTANT

## 2023-06-28 PROCEDURE — 99214 OFFICE O/P EST MOD 30 MIN: CPT | Mod: S$GLB,,, | Performed by: PHYSICIAN ASSISTANT

## 2023-06-28 PROCEDURE — 1125F AMNT PAIN NOTED PAIN PRSNT: CPT | Mod: CPTII,S$GLB,, | Performed by: PHYSICIAN ASSISTANT

## 2023-06-28 PROCEDURE — 1160F PR REVIEW ALL MEDS BY PRESCRIBER/CLIN PHARMACIST DOCUMENTED: ICD-10-PCS | Mod: CPTII,S$GLB,, | Performed by: PHYSICIAN ASSISTANT

## 2023-06-28 PROCEDURE — 1160F RVW MEDS BY RX/DR IN RCRD: CPT | Mod: CPTII,S$GLB,, | Performed by: PHYSICIAN ASSISTANT

## 2023-06-28 PROCEDURE — 3078F DIAST BP <80 MM HG: CPT | Mod: CPTII,S$GLB,, | Performed by: PHYSICIAN ASSISTANT

## 2023-06-28 PROCEDURE — 1125F PR PAIN SEVERITY QUANTIFIED, PAIN PRESENT: ICD-10-PCS | Mod: CPTII,S$GLB,, | Performed by: PHYSICIAN ASSISTANT

## 2023-06-28 PROCEDURE — 1101F PR PT FALLS ASSESS DOC 0-1 FALLS W/OUT INJ PAST YR: ICD-10-PCS | Mod: CPTII,S$GLB,, | Performed by: PHYSICIAN ASSISTANT

## 2023-06-28 PROCEDURE — 99214 PR OFFICE/OUTPT VISIT, EST, LEVL IV, 30-39 MIN: ICD-10-PCS | Mod: S$GLB,,, | Performed by: PHYSICIAN ASSISTANT

## 2023-06-28 PROCEDURE — 3288F FALL RISK ASSESSMENT DOCD: CPT | Mod: CPTII,S$GLB,, | Performed by: PHYSICIAN ASSISTANT

## 2023-06-28 RX ORDER — ALPRAZOLAM 0.5 MG/1
0.5 TABLET, ORALLY DISINTEGRATING ORAL ONCE AS NEEDED
Status: CANCELLED | OUTPATIENT
Start: 2023-06-28 | End: 2034-11-24

## 2023-06-28 NOTE — PROGRESS NOTES
This note was completed with dictation software and grammatical errors may exist.    CC:Neck pain, back pain    HPI:  The patient is a 68-year-old man with a history of hypertension, CAD, loop recorder, venous insufficiency who presents in referral from Dr. Crawford for neck pain.  He returns in follow-up today to review his lumbar spine MRI results.  He continues to have severe pain across the low back radiating to the bilateral buttocks and posterior thighs.  The pain is worse with sitting in a regular chair, also with prolonged sitting and then standing up.  He also has severe pain with walking.  He does have some relief with sitting in a recliner.  He denies having weakness or numbness.  He also reports having some left knee pain and needs a knee replacement but has been told that he needs to lose weight before he can have this done.  He has been going to the gym 3 times a week but is unable to do all of the exercises that he would like to because of his knee and back.    Previous history:  The patient is a 64-year-old man with a history of CAD on aspirin, obesity, lumbar stenosis status post surgery, gout who presents in referral from Dr. Crawford for right-sided neck pain. Patient denies any specific trauma, reports having no longstanding history of neck pain.  However about 18 months ago he began having discomfort in the right side of his neck.  It is worse with turning his head side to side especially with right lateral rotation.  It stays in the right neck and radiates up to the occiput but denies any radiation into the parietal region, no headaches with involved with this.  He denies any major radiation into the arms, no numbness or tingling, no weakness in his arms.  He denies any balance issues or dexterity issues.  He denies any major symptoms on the left side.  He describes the pain as aching and dull and sharp, worse with flexion and extension of the neck and lateral rotation, does get some relief with  lying down.    Pain intervention history:  He has been doing physical therapy for his neck, has not had much benefit with this.  He does have a history of lumbar stenosis, had undergone epidural steroid injections and radiofrequency ablation but eventually the pain worsened and he underwent surgery by Dr. Johnson.  He is status post L2-L5 decompression in 2016. He is status post right C5 and C6 medial branch block on 01/17/2020 with greater than 80% relief and then radiofrequency ablation procedure on 02/06/2020 with what he describes as 50% relief of his neck pain.   He is status post bilateral L4 and L5 medial branch radiofrequency ablation on 09/23/2020 with 0% relief.     Spine surgeries:    Antineuropathics:  NSAIDs:  Physical therapy:  Antidepressants:  Muscle relaxers:  Opioids:  Antiplatelets/Anticoagulants:    ROS:  He reports easy bruising and back pain.  Balance of review of systems is negative.    Past Medical History:   Diagnosis Date    Amaurosis fugax of right eye 4/2014    resolved    Anticoagulant long-term use     Arm mass, left 6/20/2019    Bilateral chronic knee pain 1/10/2017    Bilateral venous insufficiency     legs    Calculus of gallbladder without mention of cholecystitis or obstruction     Cardiac arrhythmia     has implanted loop recorder, palpitations. Hx dizziness on bending over    Cellulitis and abscess of leg April 2014    treated at wound care center, resolving    Cervical spondylosis 1/17/2020    Coronary artery disease     denies chest pain, denies MI, no stents; sees Dr Grant    COVID-19 10/3/22 10/3/2022    Effusion, left knee 5/23/2017    Estrogen excess 08/2017    Generalized osteoarthrosis, involving multiple sites     Hyperlipidemia     Hypertension     Hypertension 10/7/2015    Hypothyroidism     Knee pain     right far worse than left    Low back pain 8/10/2015    Lymphedema 08/01/2021    Malaise and fatigue 5/23/2017    Morbidly obese 10/12/2022    Obesity, unspecified      Occlusion and stenosis of carotid artery without mention of cerebral infarction     Primary osteoarthritis of right knee 08/2017    Sleep apnea with use of continuous positive airway pressure (CPAP)     Status post placement of implantable loop recorder     Thyroid disease     Transient arterial occlusion of retina     Transient visual loss     BOTH EYE  PER DR CAMPOVERDE  7/27/2015       Past Surgical History:   Procedure Laterality Date    BACK SURGERY N/A     decompression of L2 to L5     CARDIAC SURGERY  2013?    insertion loop recorder    CARDIAC SURGERY  2014    transesophageal echo; no clot seen, per patient    CARPAL TUNNEL RELEASE      bilateral    CHOLECYSTECTOMY      COLONOSCOPY  09/26/2016    Dr. Ryley ANDRES Right     HIATAL HERNIA REPAIR  2011    INJECTION OF ANESTHETIC AGENT AROUND MEDIAL BRANCH NERVES INNERVATING CERVICAL FACET JOINT Right 1/17/2020    Procedure: Block-nerve-medial branch-cervical C5, C6;  Surgeon: Kedar Amado MD;  Location: [facility] OR;  Service: Pain Management;  Laterality: Right;    INJECTION OF ANESTHETIC AGENT AROUND MEDIAL BRANCH NERVES INNERVATING LUMBAR FACET JOINT Bilateral 9/11/2020    Procedure: BLOCK, NERVE, FACET JOINT, LUMBAR, MEDIAL BRANCH L4 and L5;  Surgeon: Kedar Amado MD;  Location: [facility] OR;  Service: Pain Management;  Laterality: Bilateral;    injection SI joint  7/201    JOINT REPLACEMENT      right knee   august 2017    KNEE ARTHROSCOPY Bilateral     right x 2; left x 1    LAPAROSCOPIC GASTRIC BANDING      LHC      LUMBAR EPIDURAL INJECTION      POLYPECTOMY      vocal cord    RADIOFREQUENCY ABLATION OF LUMBAR MEDIAL BRANCH NERVE AT SINGLE LEVEL Bilateral 9/23/2020    Procedure: Radiofrequency Ablation, Nerve, Spinal, Lumbar, Medial Branch L4 and L5;  Surgeon: Kedar Amado MD;  Location: [facility] OR;  Service: Pain Management;  Laterality: Bilateral;    RADIOFREQUENCY THERMAL COAGULATION OF MEDIAL BRANCH OF POSTERIOR RAMUS OF CERVICAL SPINAL  NERVE Right 2/6/2020    Procedure: RADIOFREQUENCY THERMAL COAGULATION, NERVE, SPINAL, CERVICAL, POSTERIOR RAMUS, MEDIAL BRANCH C5, C6;  Surgeon: Kedar Amado MD;  Location: [facility] OR;  Service: Pain Management;  Laterality: Right;    RHIZOTOMY W/ RADIOFREQUENCY ABLATION  2016    lumbar    ROTATOR CUFF REPAIR      right    SURGICAL REMOVAL OF MASS OF UPPER EXTREMITY Left 6/20/2019    Procedure: EXCISION, MASS, UPPER EXTREMITY;  Surgeon: Max Mandel MD;  Location: [facility] OR;  Service: General;  Laterality: Left;    vocal cord surgery  1975       Social History     Socioeconomic History    Marital status:      Spouse name: Nuria    Number of children: 0   Tobacco Use    Smoking status: Never    Smokeless tobacco: Never   Substance and Sexual Activity    Alcohol use: Yes     Comment: increased - 3 large drinks nightly    Drug use: Never    Sexual activity: Yes     Partners: Female     Birth control/protection: None     Social Determinants of Health     Financial Resource Strain: Low Risk     Difficulty of Paying Living Expenses: Not hard at all   Food Insecurity: No Food Insecurity    Worried About Running Out of Food in the Last Year: Never true    Ran Out of Food in the Last Year: Never true   Transportation Needs: No Transportation Needs    Lack of Transportation (Medical): No    Lack of Transportation (Non-Medical): No   Physical Activity: Sufficiently Active    Days of Exercise per Week: 3 days    Minutes of Exercise per Session: 60 min   Stress: No Stress Concern Present    Feeling of Stress : Only a little   Social Connections: Unknown    Frequency of Communication with Friends and Family: More than three times a week    Frequency of Social Gatherings with Friends and Family: Twice a week    Active Member of Clubs or Organizations: Yes    Attends Club or Organization Meetings: More than 4 times per year    Marital Status:    Housing Stability: Low Risk     Unable to Pay for Housing in the  "Last Year: No    Number of Places Lived in the Last Year: 1    Unstable Housing in the Last Year: No         Medications/Allergies: See med card    Vitals:    23 0950   BP: 135/62   Pulse: 77   Weight: (!) 148 kg (326 lb 4.5 oz)   Height: 5' 11" (1.803 m)   PainSc:   6   PainLoc: Knee         Physical exam:  Gen: A and O x3, pleasant, well-groomed  Skin: No rashes or obvious lesions  HEENT: PERRLA, no obvious deformities on ears or in canals.Trachea midline.  CVS: Regular rate and rhythm, normal palpable pulses.  Resp: Clear to auscultation bilaterally, no wheezes or rales.  Abdomen: Soft, NT/ND.  Musculoskeletal: Able to heel walk, toe walk. No antalgic gait.     Neuro:  Upper extremities: 5/5 strength bilaterally   Lower extremities: 5/5 strength bilaterally  Reflexes: Brachioradialis 1+, Bicep 1+, Tricep 1+. Patellar 0+, Achilles 0+ bilaterally.  Sensory: Intact and symmetrical to light touch and pinprick in C2-T1 dermatomes bilaterally. Intact and symmetrical to light touch and pinprick in L2-S1 dermatomes bilaterally.    Lumbar spine:  Lumbar spine:  Range of motion is mildly reduced with flexion and severely reduced with extension with increased bilateral low back pain during each maneuver.  Oblique extension causes pain on the corresponding side.  Wilton's test causes no increased pain on either side.    Supine straight leg raise is negative bilaterally.    Internal and external rotation of the hip causes no increased pain on either side.  Myofascial exam: No tenderness to palpation across lumbar paraspinous muscles.      Imagin19 MRI C-spine:  C2-C3: Small central disc osteophyte complex.  Minimal left facet hypertrophy.  No significant spinal canal or foraminal stenosis.  C3-C4: Mild disc osteophyte complex with possible tiny central soft disc extrusion.  Moderate right and minimal left facet hypertrophy.  Slight ventral cord flattening with thin sliver preserved ventral and preserved " dorsal CSF.  Moderate-severe right and mild-moderate left foraminal stenosis.  C4-C5: Mild disc osteophyte complex with prominent central component likely small central disc extrusion..  Mild-moderate bilateral facet hypertrophy.  Ligamentum flavum thickening.  Mild ventral cord flattening with thin sliver preserved ventral and preserved dorsal CSF.  Mild-moderate bilateral foraminal stenosis.  C5-C6: Moderate disc osteophyte complex with likely small central disc extrusion.  Mild bilateral facet hypertrophy.  Ligamentum flavum thickening.  Moderate cord flattening with effacement of ventral and thin sliver preserved dorsal CSF.  Moderate bilateral foraminal stenosis.  C6-C7: Mild disc osteophyte complex with likely small central disc extrusion.  Preserved ventral and dorsal CSF.  Moderate bilateral foraminal stenosis.  C7-T1: Minimal disc osteophyte complex.  Mild left facet hypertrophy.  No significant spinal canal or foraminal stenosis.    4/15/19 EMG lower ext:   1. Subacute on chronic, moderate to severe, right lumbosacral polyradiculopathy most prominent at the L5 nerve root, with slight active denervation in the L4, L5, and S1 territories.  2. Subacute on chronic, moderate to severe, left lumbosacral polyradiculopathy most prominent of the L5-S1 level, with slight active denervation noted at the L4 level.  3. The symmetric absence of the sural sensory response can be seen in this age group.  The absence of the superficial peroneal sensory response is likely secondary to a more proximal dorsal root ganglion in the setting of L4/5 radiculopathy.There is no convincing evidence of a peripheral neuropathy, focal neuropathy or plexopathy on this study.  In addition, no myopathic changes were noted.    4/2/19 MRI L-spine:  T12-L1: Mild disc bulge.  Minimal bilateral facet hypertrophy.  Mild narrowing of the right greater than left lateral recess.  No significant overall spinal canal stenosis.  Mild right and minimal  left foraminal stenosis.    L1-L2: Mild retrolisthesis.  Mild disc bulge and posterior osteophytic ridging.  Likely small superimposed central/right lateral recess protrusion.  Mild-moderate right facet hypertrophy.  Left facets obscured by artifact.  Mild-moderate narrowing of the bilateral lateral recesses.  Mild overall spinal canal stenosis.  Mild-moderate right and mild left foraminal stenosis.   L2-L3: Left hemilaminectomy.  Mild retrolisthesis.  Spinal canal is patent.  Moderate left and mild-moderate right foraminal stenosis.   L3-L4: Fused.  Mild disc bulge.  Partially obscured bilateral facet hypertrophy, likely moderate on the right greater than left.  Minimal narrowing of bilateral lateral recesses.  No significant overall spinal canal stenosis.  Mild right and minimal left foraminal stenosis.   L4-L5: Fused.  Laminectomy.  Spinal canal is widely patent.  Mild-moderate bilateral foraminal stenosis.   BONES: L2-5 posterior fusion with bilateral pedicle screws and posterior rods with L4 laminectomy and crosslink device at the L4 level.  Left L2-3 hemilaminectomy.  Type 1 endplate changes at L4-5.  No aggressive bone marrow signal.   PARASPINAL AREA: STIR signal hyperintensity in the bilateral dorsal paraspinal muscles from the L3-L5 levels which may represent edema or denervation change.  Bilateral dorsal paraspinal muscular atrophy.  No large fluid collection.    06/19/2023 MRI lumbar spine   There is ferromagnetic artifact related to posterior spinal fusion hardware extending from L2-L5.  There is 5 mm retrolisthesis of L2 on L3 and 4 mm retrolisthesis of L1 on L2.  There is 2 mm retrolisthesis of T12 on L1.  Levoscoliotic curvature centered at L2 and appears similar compared to CT from 04/02/2019.     Paraspinal soft tissues appear normal.  Conus terminates at the L2 vertebral body.  Conus medullaris appears normal.  Normal appearance of the cauda equina.     T12-L1: 3 mm broad-based disc bulge with  mild hypertrophic facet changes and thickening of the ligamentum flavum.  Mild central canal narrowing.  Mild-to-moderate right and minimal left foraminal narrowing.     L1-L2: Grade 1 retrolisthesis of L1 on L2.  Small posterior disc osteophyte complex is present.  Hypertrophic facet changes are obscured secondary to ferromagnetic artifact.  There is mild central canal narrowing, worst at the right lateral recess.  Severe right foraminal narrowing.  Moderate left foraminal narrowing.     L2-L3: Grade 1 retrolisthesis of L2 on L3.  Tiny posterior disc osteophyte complex is present.  There is small endplate osteophytes.  Hypertrophic facet changes are obscured secondary to ferromagnetic artifact related to the spinal fusion hardware.  No central canal stenosis.  Moderate to severe bilateral neural foraminal narrowing is present.     L3-L4: No central canal or foraminal narrowing.     L4-L5: Asymmetric posterior disc osteophyte complex on the left is present.  Hypertrophic facet changes are obscured secondary to ferromagnetic artifact related to the posterior spinal fusion hardware.  Minimal residual disc bulge.  There is mild narrowing of the left lateral recess.  Moderate to severe neural foraminal narrowing, worse on the left.     L5-S1: Minimal disc bulge with severe hypertrophic facet changes and thickening of the ligamentum flavum.  Mild central canal narrowing.  Moderate to severe right and moderate left neural foraminal narrowing.    Assessment:   The patient is a 68-year-old man with a history of hypertension, CAD, loop recorder, venous insufficiency who presents in referral from Dr. Crawford for neck pain.    1. Lumbar radiculopathy  Ambulatory referral/consult to Physical/Occupational Therapy    Place in Outpatient    Case Request Operating Room: Injection,steroid,epidural,transforaminal approach L5/S1    Vital signs    Verify informed consent    Notify physician     Notify physician     Notify physician  (specify)    Diet NPO    alprazolam ODT dissolvable tablet 0.5 mg      2. Lumbar spondylosis  Ambulatory referral/consult to Physical/Occupational Therapy      3. DDD (degenerative disc disease), lumbar  Ambulatory referral/consult to Physical/Occupational Therapy          Plan:  1. I reviewed his lumbar spine MRI with him we discussed that he has adjacent segment disease above and below his fusion.  He has fairly significant foraminal stenosis at L5/S1 bilaterally so I will schedule him for bilateral L5/S1 transforaminal epidural steroid injections.  If he does not have relief with this we may consider bilateral L1/2 injections.  Lastly, we briefly discussed spinal cord stimulation as an option if he is failing conservative treatment.    2. I completed orders for physical therapy and aquatic therapy at [facility].    3. Follow-up in 4 weeks postprocedure or sooner as needed.

## 2023-07-05 NOTE — PROGRESS NOTES
"Subjective:    Patient ID:  Arthur Moreno is a 68 y.o. male who presents for follow-up of Coronary Artery Disease      Problem List Items Addressed This Visit          Cardiac/Vascular    Carotid artery disease without cerebral infarction - Primary (Chronic)    Coronary artery disease involving native coronary artery of native heart without angina pectoris (Chronic)    Essential hypertension (Chronic)    Mixed hyperlipidemia (Chronic)       HPI    Patient was last seen on 08/09/2022 at which time he was doing okay from a cardiac standpoint.    On assessment today, the patient states that he feels Ok in general.    No angina  Frequency of torsemide use - daily, not needing extra       Objective:     Vitals:    07/06/23 1416   BP: 98/61   BP Location: Left arm   Patient Position: Sitting   BP Method: Large (Automatic)   Pulse: 78   Weight: (!) 146.8 kg (323 lb 10.2 oz)   Height: 5' 11" (1.803 m)       BP Readings from Last 5 Encounters:   07/06/23 98/61   06/28/23 135/62   05/29/23 137/62   05/01/23 110/68   03/02/23 116/67        Physical Exam  Constitutional:       Appearance: He is well-developed.   HENT:      Head: Normocephalic and atraumatic.   Neck:      Vascular: No JVD.   Cardiovascular:      Rate and Rhythm: Normal rate and regular rhythm.      Heart sounds: Normal heart sounds. No murmur heard.    No friction rub. No gallop.   Pulmonary:      Effort: Pulmonary effort is normal. No respiratory distress.      Breath sounds: Normal breath sounds. No wheezing or rales.   Abdominal:      General: Bowel sounds are normal.      Palpations: Abdomen is soft.      Tenderness: There is no abdominal tenderness. There is no guarding or rebound.   Musculoskeletal:      Cervical back: Normal range of motion and neck supple.   Skin:     General: Skin is warm and dry.   Neurological:      Mental Status: He is alert and oriented to person, place, and time.   Psychiatric:         Behavior: Behavior normal.           Current " Outpatient Medications   Medication Instructions    allopurinoL (ZYLOPRIM) 300 MG tablet TAKE 1 TABLET BY MOUTH ONCE DAILY    ALPRAZolam (XANAX) 0.25 MG tablet TAKE 1 TABLET (0.25 MG TOTAL) BY MOUTH 3 TIMES DAILY AS NEEDED FOR ANXIETY.    aspirin (ECOTRIN) 81 mg, Oral, Daily, Patient held since 6/12/16 per md instructions    atenoloL (TENORMIN) 50 mg, Oral, Daily    atorvastatin (LIPITOR) 40 mg, Oral, Nightly    b complex vitamins capsule 1 capsule, Oral, Daily    co-enzyme Q-10 50 mg, Oral, Daily    colchicine-probenecid 0.5-500 mg (CO-BENEMID) 500-0.5 mg Tab 1 tablet, Oral, Daily    folic acid (FOLVITE) 1 mg, Oral, Daily    fosinopriL (MONOPRIL) 20 mg, Oral, Daily    Lactobacillus rhamnosus GG (CULTURELLE) 10 billion cell capsule 1 capsule, Oral, Daily    levothyroxine (SYNTHROID) 75 mcg, Oral, Before breakfast    linaCLOtide (LINZESS) 145 mcg, Oral, Before breakfast    methocarbamoL (ROBAXIN) 750 mg, Oral, 2 times daily PRN    multivitamin (THERAGRAN) per tablet 1 tablet, Oral, Daily    OZEMPIC 1 mg, Subcutaneous, Every 7 days    pantoprazole (PROTONIX) 40 mg, Oral, Daily    potassium chloride (KLOR-CON) 10 MEQ TbSR TAKE 2 TABLETS BY MOUTH ONCE DAILY    torsemide (DEMADEX) 20 mg, Oral, Every morning       Lipid Panel:   Lab Results   Component Value Date    CHOL 152 08/09/2022    HDL 71 08/09/2022    LDLCALC 67.0 08/09/2022    TRIG 70 08/09/2022    CHOLHDL 46.7 08/09/2022       The 10-year ASCVD risk score (Pastor ROBLES, et al., 2019) is: 9.2%    Values used to calculate the score:      Age: 68 years      Sex: Male      Is Non- : No      Diabetic: No      Tobacco smoker: No      Systolic Blood Pressure: 98 mmHg      Is BP treated: Yes      HDL Cholesterol: 71 mg/dL      Total Cholesterol: 152 mg/dL    All pertinent labs, imaging, and EKGs reviewed.  Patient's most recent EKG tracing was personally interpreted by this provider.    Most Recent EKG Results  Results for orders placed or performed  during the hospital encounter of 02/14/23   EKG 12-lead    Collection Time: 02/14/23  1:50 PM    Narrative    Test Reason : R00.0,    Vent. Rate : 117 BPM     Atrial Rate : 117 BPM     P-R Int : 148 ms          QRS Dur : 086 ms      QT Int : 326 ms       P-R-T Axes : 057 040 053 degrees     QTc Int : 454 ms    Sinus tachycardia  Nonspecific ST abnormality  Abnormal ECG  When compared with ECG of 10-HERNANDEZ-2022 14:32,  Previous ECG has undetermined rhythm, needs review  Confirmed by Kin Asencio MD ([id number]) on 2/15/2023 11:41:35 AM    Referred By: RUFINO   SELF           Confirmed By:Kin Asencio MD       Most Recent Echocardiogram Results  Results for orders placed during the hospital encounter of 02/14/23    Echo    Interpretation Summary  · Normal systolic function.  · The estimated ejection fraction is 60%.  · Normal left ventricular diastolic function.  · Normal right ventricular size with normal right ventricular systolic function.  · Mild tricuspid regurgitation.  · Normal central venous pressure (3 mmHg).  · The estimated PA systolic pressure is 19 mmHg.      Most Recent Nuclear Stress Test Results  Results for orders placed during the hospital encounter of 01/10/22    Nuclear Stress - Cardiology Interpreted    Interpretation Summary    Normal myocardial perfusion scan. There is no evidence of myocardial ischemia or infarction.    There is trivial to mild apical thinning which is a normal variant.    The gated perfusion images showed an ejection fraction of 58% at rest.    There is normal wall motion at rest.    The EKG portion of this study is negative for ischemia.    There are no prior studies for comparison.      Most Recent Cardiac PET Stress Test Results  No results found for this or any previous visit.      Most Recent Cardiovascular Angiogram results  No results found for this or any previous visit.      Other Most Recent Cardiology Results  Results for orders placed during the hospital encounter of  02/14/23    CARDIAC MONITORING STRIPS        Assessment:       1. Carotid artery disease without cerebral infarction    2. Coronary artery disease involving native coronary artery of native heart without angina pectoris    3. Essential hypertension    4. Mixed hyperlipidemia         Plan:     Symptoms OK today  BP/Pulse OK today  Most recent echocardiogram reviewed personally     Continue aspirin 81 mg PO Daily  Continue atenolol 50 mg PO Daily  Continue atorvastatin 40 mg PO Daily   Continue coenzyme Q10   Continue fosinopril 20 mg PO Daily  Continue torsemide 20 mg PO Daily   Going to cut down ETOH use    Continue other cardiac medications  Mediterranean Diet/Cardiovascular Exercise Program    Patient queried and all questions were answered.    F/u in 6-9 months to reassess      Signed:    Cameron Carlson MD  7/6/2023 8:05 AM

## 2023-07-06 ENCOUNTER — OFFICE VISIT (OUTPATIENT)
Dept: CARDIOLOGY | Facility: CLINIC | Age: 68
End: 2023-07-06
Payer: MEDICARE

## 2023-07-06 VITALS
HEART RATE: 78 BPM | WEIGHT: 315 LBS | SYSTOLIC BLOOD PRESSURE: 98 MMHG | HEIGHT: 71 IN | DIASTOLIC BLOOD PRESSURE: 61 MMHG | BODY MASS INDEX: 44.1 KG/M2

## 2023-07-06 DIAGNOSIS — E78.2 MIXED HYPERLIPIDEMIA: Chronic | ICD-10-CM

## 2023-07-06 DIAGNOSIS — I10 ESSENTIAL HYPERTENSION: Chronic | ICD-10-CM

## 2023-07-06 DIAGNOSIS — I77.9 CAROTID ARTERY DISEASE WITHOUT CEREBRAL INFARCTION: Primary | Chronic | ICD-10-CM

## 2023-07-06 DIAGNOSIS — I25.10 CORONARY ARTERY DISEASE INVOLVING NATIVE CORONARY ARTERY OF NATIVE HEART WITHOUT ANGINA PECTORIS: Chronic | ICD-10-CM

## 2023-07-06 PROCEDURE — 3074F SYST BP LT 130 MM HG: CPT | Mod: CPTII,S$GLB,, | Performed by: INTERNAL MEDICINE

## 2023-07-06 PROCEDURE — 99999 PR PBB SHADOW E&M-EST. PATIENT-LVL IV: CPT | Mod: PBBFAC,,, | Performed by: INTERNAL MEDICINE

## 2023-07-06 PROCEDURE — 1101F PT FALLS ASSESS-DOCD LE1/YR: CPT | Mod: CPTII,S$GLB,, | Performed by: INTERNAL MEDICINE

## 2023-07-06 PROCEDURE — 3044F HG A1C LEVEL LT 7.0%: CPT | Mod: CPTII,S$GLB,, | Performed by: INTERNAL MEDICINE

## 2023-07-06 PROCEDURE — 1160F RVW MEDS BY RX/DR IN RCRD: CPT | Mod: CPTII,S$GLB,, | Performed by: INTERNAL MEDICINE

## 2023-07-06 PROCEDURE — 3288F FALL RISK ASSESSMENT DOCD: CPT | Mod: CPTII,S$GLB,, | Performed by: INTERNAL MEDICINE

## 2023-07-06 PROCEDURE — 3078F PR MOST RECENT DIASTOLIC BLOOD PRESSURE < 80 MM HG: ICD-10-PCS | Mod: CPTII,S$GLB,, | Performed by: INTERNAL MEDICINE

## 2023-07-06 PROCEDURE — 3008F PR BODY MASS INDEX (BMI) DOCUMENTED: ICD-10-PCS | Mod: CPTII,S$GLB,, | Performed by: INTERNAL MEDICINE

## 2023-07-06 PROCEDURE — 3288F PR FALLS RISK ASSESSMENT DOCUMENTED: ICD-10-PCS | Mod: CPTII,S$GLB,, | Performed by: INTERNAL MEDICINE

## 2023-07-06 PROCEDURE — 3008F BODY MASS INDEX DOCD: CPT | Mod: CPTII,S$GLB,, | Performed by: INTERNAL MEDICINE

## 2023-07-06 PROCEDURE — 4010F PR ACE/ARB THEARPY RXD/TAKEN: ICD-10-PCS | Mod: CPTII,S$GLB,, | Performed by: INTERNAL MEDICINE

## 2023-07-06 PROCEDURE — 3074F PR MOST RECENT SYSTOLIC BLOOD PRESSURE < 130 MM HG: ICD-10-PCS | Mod: CPTII,S$GLB,, | Performed by: INTERNAL MEDICINE

## 2023-07-06 PROCEDURE — 1160F PR REVIEW ALL MEDS BY PRESCRIBER/CLIN PHARMACIST DOCUMENTED: ICD-10-PCS | Mod: CPTII,S$GLB,, | Performed by: INTERNAL MEDICINE

## 2023-07-06 PROCEDURE — 1159F PR MEDICATION LIST DOCUMENTED IN MEDICAL RECORD: ICD-10-PCS | Mod: CPTII,S$GLB,, | Performed by: INTERNAL MEDICINE

## 2023-07-06 PROCEDURE — 1101F PR PT FALLS ASSESS DOC 0-1 FALLS W/OUT INJ PAST YR: ICD-10-PCS | Mod: CPTII,S$GLB,, | Performed by: INTERNAL MEDICINE

## 2023-07-06 PROCEDURE — 99214 OFFICE O/P EST MOD 30 MIN: CPT | Mod: S$GLB,,, | Performed by: INTERNAL MEDICINE

## 2023-07-06 PROCEDURE — 4010F ACE/ARB THERAPY RXD/TAKEN: CPT | Mod: CPTII,S$GLB,, | Performed by: INTERNAL MEDICINE

## 2023-07-06 PROCEDURE — 99214 PR OFFICE/OUTPT VISIT, EST, LEVL IV, 30-39 MIN: ICD-10-PCS | Mod: S$GLB,,, | Performed by: INTERNAL MEDICINE

## 2023-07-06 PROCEDURE — 3044F PR MOST RECENT HEMOGLOBIN A1C LEVEL <7.0%: ICD-10-PCS | Mod: CPTII,S$GLB,, | Performed by: INTERNAL MEDICINE

## 2023-07-06 PROCEDURE — 3078F DIAST BP <80 MM HG: CPT | Mod: CPTII,S$GLB,, | Performed by: INTERNAL MEDICINE

## 2023-07-06 PROCEDURE — 1126F PR PAIN SEVERITY QUANTIFIED, NO PAIN PRESENT: ICD-10-PCS | Mod: CPTII,S$GLB,, | Performed by: INTERNAL MEDICINE

## 2023-07-06 PROCEDURE — 99999 PR PBB SHADOW E&M-EST. PATIENT-LVL IV: ICD-10-PCS | Mod: PBBFAC,,, | Performed by: INTERNAL MEDICINE

## 2023-07-06 PROCEDURE — 1159F MED LIST DOCD IN RCRD: CPT | Mod: CPTII,S$GLB,, | Performed by: INTERNAL MEDICINE

## 2023-07-06 PROCEDURE — 1126F AMNT PAIN NOTED NONE PRSNT: CPT | Mod: CPTII,S$GLB,, | Performed by: INTERNAL MEDICINE

## 2023-07-11 ENCOUNTER — PATIENT MESSAGE (OUTPATIENT)
Dept: INFECTIOUS DISEASES | Facility: CLINIC | Age: 68
End: 2023-07-11
Payer: MEDICARE

## 2023-07-11 PROBLEM — M54.16 LUMBAR RADICULOPATHY: Status: ACTIVE | Noted: 2023-07-11

## 2023-08-08 ENCOUNTER — OFFICE VISIT (OUTPATIENT)
Dept: PAIN MEDICINE | Facility: CLINIC | Age: 68
End: 2023-08-08
Payer: MEDICARE

## 2023-08-08 VITALS
HEART RATE: 76 BPM | HEIGHT: 71 IN | WEIGHT: 315 LBS | SYSTOLIC BLOOD PRESSURE: 124 MMHG | DIASTOLIC BLOOD PRESSURE: 58 MMHG | BODY MASS INDEX: 44.1 KG/M2

## 2023-08-08 DIAGNOSIS — M51.36 DDD (DEGENERATIVE DISC DISEASE), LUMBAR: ICD-10-CM

## 2023-08-08 DIAGNOSIS — M96.1 FAILED BACK SURGICAL SYNDROME: ICD-10-CM

## 2023-08-08 DIAGNOSIS — G89.4 CHRONIC PAIN DISORDER: ICD-10-CM

## 2023-08-08 DIAGNOSIS — M47.816 LUMBAR SPONDYLOSIS: ICD-10-CM

## 2023-08-08 DIAGNOSIS — M54.16 LUMBAR RADICULOPATHY: Primary | ICD-10-CM

## 2023-08-08 PROCEDURE — 3288F FALL RISK ASSESSMENT DOCD: CPT | Mod: CPTII,S$GLB,,

## 2023-08-08 PROCEDURE — 3078F DIAST BP <80 MM HG: CPT | Mod: CPTII,S$GLB,,

## 2023-08-08 PROCEDURE — 3044F PR MOST RECENT HEMOGLOBIN A1C LEVEL <7.0%: ICD-10-PCS | Mod: CPTII,S$GLB,,

## 2023-08-08 PROCEDURE — 3044F HG A1C LEVEL LT 7.0%: CPT | Mod: CPTII,S$GLB,,

## 2023-08-08 PROCEDURE — 1125F AMNT PAIN NOTED PAIN PRSNT: CPT | Mod: CPTII,S$GLB,,

## 2023-08-08 PROCEDURE — 3078F PR MOST RECENT DIASTOLIC BLOOD PRESSURE < 80 MM HG: ICD-10-PCS | Mod: CPTII,S$GLB,,

## 2023-08-08 PROCEDURE — 99214 OFFICE O/P EST MOD 30 MIN: CPT | Mod: S$GLB,,,

## 2023-08-08 PROCEDURE — 3288F PR FALLS RISK ASSESSMENT DOCUMENTED: ICD-10-PCS | Mod: CPTII,S$GLB,,

## 2023-08-08 PROCEDURE — 99999 PR PBB SHADOW E&M-EST. PATIENT-LVL V: CPT | Mod: PBBFAC,,,

## 2023-08-08 PROCEDURE — 3008F BODY MASS INDEX DOCD: CPT | Mod: CPTII,S$GLB,,

## 2023-08-08 PROCEDURE — 99214 PR OFFICE/OUTPT VISIT, EST, LEVL IV, 30-39 MIN: ICD-10-PCS | Mod: S$GLB,,,

## 2023-08-08 PROCEDURE — 3008F PR BODY MASS INDEX (BMI) DOCUMENTED: ICD-10-PCS | Mod: CPTII,S$GLB,,

## 2023-08-08 PROCEDURE — 1159F MED LIST DOCD IN RCRD: CPT | Mod: CPTII,S$GLB,,

## 2023-08-08 PROCEDURE — 4010F ACE/ARB THERAPY RXD/TAKEN: CPT | Mod: CPTII,S$GLB,,

## 2023-08-08 PROCEDURE — 1101F PT FALLS ASSESS-DOCD LE1/YR: CPT | Mod: CPTII,S$GLB,,

## 2023-08-08 PROCEDURE — 1159F PR MEDICATION LIST DOCUMENTED IN MEDICAL RECORD: ICD-10-PCS | Mod: CPTII,S$GLB,,

## 2023-08-08 PROCEDURE — 3074F PR MOST RECENT SYSTOLIC BLOOD PRESSURE < 130 MM HG: ICD-10-PCS | Mod: CPTII,S$GLB,,

## 2023-08-08 PROCEDURE — 1101F PR PT FALLS ASSESS DOC 0-1 FALLS W/OUT INJ PAST YR: ICD-10-PCS | Mod: CPTII,S$GLB,,

## 2023-08-08 PROCEDURE — 4010F PR ACE/ARB THEARPY RXD/TAKEN: ICD-10-PCS | Mod: CPTII,S$GLB,,

## 2023-08-08 PROCEDURE — 3074F SYST BP LT 130 MM HG: CPT | Mod: CPTII,S$GLB,,

## 2023-08-08 PROCEDURE — 1125F PR PAIN SEVERITY QUANTIFIED, PAIN PRESENT: ICD-10-PCS | Mod: CPTII,S$GLB,,

## 2023-08-08 PROCEDURE — 99999 PR PBB SHADOW E&M-EST. PATIENT-LVL V: ICD-10-PCS | Mod: PBBFAC,,,

## 2023-08-08 RX ORDER — ALPRAZOLAM 0.5 MG/1
1 TABLET, ORALLY DISINTEGRATING ORAL ONCE AS NEEDED
Status: CANCELLED | OUTPATIENT
Start: 2023-08-25 | End: 2035-01-20

## 2023-08-08 NOTE — PROGRESS NOTES
This note was completed with dictation software and grammatical errors may exist.    CC:Neck pain, back pain    HPI:  The patient is a 68-year-old man with a history of hypertension, CAD, loop recorder, venous insufficiency who presents in referral from Dr. Crawford for neck pain.  He is status post bilateral L5/S1 RIGO on 07/11/2023 with initially 70% relief lasting roughly 4 weeks and now 0% relief.  Today he is reporting continued lower back pain, 6/10, constant, with radiation into bilateral hips, bilateral buttocks and back of bilateral legs stopping at the knees.  Patient describes his pain is throbbing pain, worsened with physical activities.  He denies any new numbness, weakness or any new changes to his bowel or bladder function.  He has continued to attend formal physical therapy with out significant relief at this time.      Previous history:  The patient is a 64-year-old man with a history of CAD on aspirin, obesity, lumbar stenosis status post surgery, gout who presents in referral from Dr. Crawford for right-sided neck pain. Patient denies any specific trauma, reports having no longstanding history of neck pain.  However about 18 months ago he began having discomfort in the right side of his neck.  It is worse with turning his head side to side especially with right lateral rotation.  It stays in the right neck and radiates up to the occiput but denies any radiation into the parietal region, no headaches with involved with this.  He denies any major radiation into the arms, no numbness or tingling, no weakness in his arms.  He denies any balance issues or dexterity issues.  He denies any major symptoms on the left side.  He describes the pain as aching and dull and sharp, worse with flexion and extension of the neck and lateral rotation, does get some relief with lying down.    Pain intervention history:  He has been doing physical therapy for his neck, has not had much benefit with this.  He does have a  history of lumbar stenosis, had undergone epidural steroid injections and radiofrequency ablation but eventually the pain worsened and he underwent surgery by Dr. Johnson.  He is status post L2-L5 decompression in 2016. He is status post right C5 and C6 medial branch block on 01/17/2020 with greater than 80% relief and then radiofrequency ablation procedure on 02/06/2020 with what he describes as 50% relief of his neck pain.   He is status post bilateral L4 and L5 medial branch radiofrequency ablation on 09/23/2020 with 0% relief.  He is status post bilateral L5/S1 RIGO on 07/11/2023 with initially 70% relief lasting roughly 4 weeks and now 0% relief.    Spine surgeries:    Antineuropathics:  NSAIDs:  Physical therapy:  Antidepressants:  Muscle relaxers:  Opioids:  Antiplatelets/Anticoagulants:    ROS:  He reports easy bruising and back pain.  Balance of review of systems is negative.    Past Medical History:   Diagnosis Date    Amaurosis fugax of right eye 04/2014    resolved    Anticoagulant long-term use     Arm mass, left 06/20/2019    Bilateral chronic knee pain 01/10/2017    Bilateral venous insufficiency     legs    Calculus of gallbladder without mention of cholecystitis or obstruction     Cardiac arrhythmia     has implanted loop recorder, palpitations. Hx dizziness on bending over    Cellulitis and abscess of leg 04/2014    treated at wound care center, resolving    Cervical spondylosis 01/17/2020    Coronary artery disease     denies chest pain, denies MI, no stents; sees Dr Carlson    COVID-19 10/3/22 10/03/2022    Effusion, left knee 05/23/2017    Estrogen excess 08/2017    Generalized osteoarthrosis, involving multiple sites     Hyperlipidemia     Hypertension     Hypertension 10/07/2015    Hypothyroidism     Knee pain     right far worse than left    Low back pain 08/10/2015    Lymphedema 08/01/2021    Malaise and fatigue 05/23/2017    Morbidly obese 10/12/2022    Obesity, unspecified     Occlusion and  stenosis of carotid artery without mention of cerebral infarction     Primary osteoarthritis of right knee 08/2017    Sleep apnea with use of continuous positive airway pressure (CPAP)     Status post placement of implantable loop recorder     Thyroid disease     Transient arterial occlusion of retina     Transient visual loss     BOTH EYE  PER DR CAMPOVERDE  7/27/2015       Past Surgical History:   Procedure Laterality Date    BACK SURGERY N/A     decompression of L2 to L5     CARDIAC SURGERY  2013?    insertion loop recorder, and then removed    CARDIAC SURGERY  2014    transesophageal echo; no clot seen, per patient    CARPAL TUNNEL RELEASE      bilateral    CHOLECYSTECTOMY      COLONOSCOPY  09/26/2016    Dr. Ryley ANDRES Right     HIATAL HERNIA REPAIR  2011    INJECTION OF ANESTHETIC AGENT AROUND MEDIAL BRANCH NERVES INNERVATING CERVICAL FACET JOINT Right 01/17/2020    Procedure: Block-nerve-medial branch-cervical C5, C6;  Surgeon: Kedar Amado MD;  Location: [facility] OR;  Service: Pain Management;  Laterality: Right;    INJECTION OF ANESTHETIC AGENT AROUND MEDIAL BRANCH NERVES INNERVATING LUMBAR FACET JOINT Bilateral 09/11/2020    Procedure: BLOCK, NERVE, FACET JOINT, LUMBAR, MEDIAL BRANCH L4 and L5;  Surgeon: Kedar Amado MD;  Location: [facility] OR;  Service: Pain Management;  Laterality: Bilateral;    injection SI joint  7/201    JOINT REPLACEMENT      right knee   august 2017    KNEE ARTHROSCOPY Bilateral     right x 2; left x 1    LAPAROSCOPIC GASTRIC BANDING      LAPAROSCOPIC GASTRIC BANDING      LHC      LUMBAR EPIDURAL INJECTION      POLYPECTOMY      vocal cord    RADIOFREQUENCY ABLATION OF LUMBAR MEDIAL BRANCH NERVE AT SINGLE LEVEL Bilateral 09/23/2020    Procedure: Radiofrequency Ablation, Nerve, Spinal, Lumbar, Medial Branch L4 and L5;  Surgeon: Kedar Amado MD;  Location: [facility] OR;  Service: Pain Management;  Laterality: Bilateral;    RADIOFREQUENCY THERMAL COAGULATION OF MEDIAL BRANCH  OF POSTERIOR RAMUS OF CERVICAL SPINAL NERVE Right 02/06/2020    Procedure: RADIOFREQUENCY THERMAL COAGULATION, NERVE, SPINAL, CERVICAL, POSTERIOR RAMUS, MEDIAL BRANCH C5, C6;  Surgeon: Kedar Amado MD;  Location: [facility] OR;  Service: Pain Management;  Laterality: Right;    RHIZOTOMY W/ RADIOFREQUENCY ABLATION  2016    lumbar    ROTATOR CUFF REPAIR      right    SURGICAL REMOVAL OF MASS OF UPPER EXTREMITY Left 06/20/2019    Procedure: EXCISION, MASS, UPPER EXTREMITY;  Surgeon: Max Mandel MD;  Location: [facility] OR;  Service: General;  Laterality: Left;    TRANSFORAMINAL EPIDURAL INJECTION OF STEROID Bilateral 7/11/2023    Procedure: Injection,steroid,epidural,transforaminal approach L5/S1;  Surgeon: Kedar Amado MD;  Location: [facility];  Service: Pain Management;  Laterality: Bilateral;    vocal cord surgery  1975       Social History     Socioeconomic History    Marital status:      Spouse name: Nuria    Number of children: 0   Tobacco Use    Smoking status: Never     Passive exposure: Current (one day weekly)    Smokeless tobacco: Never   Substance and Sexual Activity    Alcohol use: Yes     Comment: increased - 3 large drinks nightly    Drug use: Never    Sexual activity: Yes     Partners: Female     Birth control/protection: None     Social Determinants of Health     Financial Resource Strain: Low Risk  (7/3/2023)    Overall Financial Resource Strain (CARDIA)     Difficulty of Paying Living Expenses: Not hard at all   Food Insecurity: No Food Insecurity (7/3/2023)    Hunger Vital Sign     Worried About Running Out of Food in the Last Year: Never true     Ran Out of Food in the Last Year: Never true   Transportation Needs: No Transportation Needs (7/3/2023)    PRAPARE - Transportation     Lack of Transportation (Medical): No     Lack of Transportation (Non-Medical): No   Physical Activity: Sufficiently Active (7/3/2023)    Exercise Vital Sign     Days of Exercise per Week: 3 days     Minutes  "of Exercise per Session: 60 min   Stress: No Stress Concern Present (7/3/2023)    [employer] - Occupational Stress Questionnaire     Feeling of Stress : Only a little   Social Connections: Unknown (7/3/2023)    Social Connection and Isolation Panel [NHANES]     Frequency of Communication with Friends and Family: Three times a week     Frequency of Social Gatherings with Friends and Family: Three times a week     Active Member of Clubs or Organizations: Yes     Attends Club or Organization Meetings: More than 4 times per year     Marital Status:    Housing Stability: Low Risk  (7/3/2023)    Housing Stability Vital Sign     Unable to Pay for Housing in the Last Year: No     Number of Places Lived in the Last Year: 1     Unstable Housing in the Last Year: No         Medications/Allergies: See med card    Vitals:    23 1508 23 1510   BP: (!) 160/70 (!) 124/58   Pulse: 77 76   Weight: (!) 146.6 kg (323 lb 3.1 oz)    Height: 5' 11" (1.803 m)    PainSc:   6    PainLoc: Back            Physical exam:  Gen: A and O x3, pleasant, well-groomed  Skin: No rashes or obvious lesions  HEENT: PERRLA, no obvious deformities on ears or in canals.Trachea midline.  CVS: Regular rate and rhythm, normal palpable pulses.  Resp: Clear to auscultation bilaterally, no wheezes or rales.  Abdomen: Soft, NT/ND.  Musculoskeletal:  No antalgic gait.     Neuro:  Lower extremities: 5/5 strength bilaterally  Reflexes:  Patellar 0+, Achilles 0+ bilaterally.  Sensory:  Intact and symmetrical to light touch and pinprick in L2-S1 dermatomes bilaterally.    Lumbar spine:  Lumbar spine:  Range of motion is mildly reduced with flexion and severely reduced with extension with increased bilateral low back pain during each maneuver.  Oblique extension causes pain on the corresponding side.  Myofascial exam: No tenderness to palpation across lumbar paraspinous muscles.      Imagin19 MRI C-spine:  C2-C3: " Small central disc osteophyte complex.  Minimal left facet hypertrophy.  No significant spinal canal or foraminal stenosis.  C3-C4: Mild disc osteophyte complex with possible tiny central soft disc extrusion.  Moderate right and minimal left facet hypertrophy.  Slight ventral cord flattening with thin sliver preserved ventral and preserved dorsal CSF.  Moderate-severe right and mild-moderate left foraminal stenosis.  C4-C5: Mild disc osteophyte complex with prominent central component likely small central disc extrusion..  Mild-moderate bilateral facet hypertrophy.  Ligamentum flavum thickening.  Mild ventral cord flattening with thin sliver preserved ventral and preserved dorsal CSF.  Mild-moderate bilateral foraminal stenosis.  C5-C6: Moderate disc osteophyte complex with likely small central disc extrusion.  Mild bilateral facet hypertrophy.  Ligamentum flavum thickening.  Moderate cord flattening with effacement of ventral and thin sliver preserved dorsal CSF.  Moderate bilateral foraminal stenosis.  C6-C7: Mild disc osteophyte complex with likely small central disc extrusion.  Preserved ventral and dorsal CSF.  Moderate bilateral foraminal stenosis.  C7-T1: Minimal disc osteophyte complex.  Mild left facet hypertrophy.  No significant spinal canal or foraminal stenosis.    4/15/19 EMG lower ext:   1. Subacute on chronic, moderate to severe, right lumbosacral polyradiculopathy most prominent at the L5 nerve root, with slight active denervation in the L4, L5, and S1 territories.  2. Subacute on chronic, moderate to severe, left lumbosacral polyradiculopathy most prominent of the L5-S1 level, with slight active denervation noted at the L4 level.  3. The symmetric absence of the sural sensory response can be seen in this age group.  The absence of the superficial peroneal sensory response is likely secondary to a more proximal dorsal root ganglion in the setting of L4/5 radiculopathy.There is no convincing evidence  of a peripheral neuropathy, focal neuropathy or plexopathy on this study.  In addition, no myopathic changes were noted.    4/2/19 MRI L-spine:  T12-L1: Mild disc bulge.  Minimal bilateral facet hypertrophy.  Mild narrowing of the right greater than left lateral recess.  No significant overall spinal canal stenosis.  Mild right and minimal left foraminal stenosis.    L1-L2: Mild retrolisthesis.  Mild disc bulge and posterior osteophytic ridging.  Likely small superimposed central/right lateral recess protrusion.  Mild-moderate right facet hypertrophy.  Left facets obscured by artifact.  Mild-moderate narrowing of the bilateral lateral recesses.  Mild overall spinal canal stenosis.  Mild-moderate right and mild left foraminal stenosis.   L2-L3: Left hemilaminectomy.  Mild retrolisthesis.  Spinal canal is patent.  Moderate left and mild-moderate right foraminal stenosis.   L3-L4: Fused.  Mild disc bulge.  Partially obscured bilateral facet hypertrophy, likely moderate on the right greater than left.  Minimal narrowing of bilateral lateral recesses.  No significant overall spinal canal stenosis.  Mild right and minimal left foraminal stenosis.   L4-L5: Fused.  Laminectomy.  Spinal canal is widely patent.  Mild-moderate bilateral foraminal stenosis.   BONES: L2-5 posterior fusion with bilateral pedicle screws and posterior rods with L4 laminectomy and crosslink device at the L4 level.  Left L2-3 hemilaminectomy.  Type 1 endplate changes at L4-5.  No aggressive bone marrow signal.   PARASPINAL AREA: STIR signal hyperintensity in the bilateral dorsal paraspinal muscles from the L3-L5 levels which may represent edema or denervation change.  Bilateral dorsal paraspinal muscular atrophy.  No large fluid collection.    06/19/2023 MRI lumbar spine   There is ferromagnetic artifact related to posterior spinal fusion hardware extending from L2-L5.  There is 5 mm retrolisthesis of L2 on L3 and 4 mm retrolisthesis of L1 on L2.   There is 2 mm retrolisthesis of T12 on L1.  Levoscoliotic curvature centered at L2 and appears similar compared to CT from 04/02/2019.     Paraspinal soft tissues appear normal.  Conus terminates at the L2 vertebral body.  Conus medullaris appears normal.  Normal appearance of the cauda equina.     T12-L1: 3 mm broad-based disc bulge with mild hypertrophic facet changes and thickening of the ligamentum flavum.  Mild central canal narrowing.  Mild-to-moderate right and minimal left foraminal narrowing.     L1-L2: Grade 1 retrolisthesis of L1 on L2.  Small posterior disc osteophyte complex is present.  Hypertrophic facet changes are obscured secondary to ferromagnetic artifact.  There is mild central canal narrowing, worst at the right lateral recess.  Severe right foraminal narrowing.  Moderate left foraminal narrowing.     L2-L3: Grade 1 retrolisthesis of L2 on L3.  Tiny posterior disc osteophyte complex is present.  There is small endplate osteophytes.  Hypertrophic facet changes are obscured secondary to ferromagnetic artifact related to the spinal fusion hardware.  No central canal stenosis.  Moderate to severe bilateral neural foraminal narrowing is present.     L3-L4: No central canal or foraminal narrowing.     L4-L5: Asymmetric posterior disc osteophyte complex on the left is present.  Hypertrophic facet changes are obscured secondary to ferromagnetic artifact related to the posterior spinal fusion hardware.  Minimal residual disc bulge.  There is mild narrowing of the left lateral recess.  Moderate to severe neural foraminal narrowing, worse on the left.     L5-S1: Minimal disc bulge with severe hypertrophic facet changes and thickening of the ligamentum flavum.  Mild central canal narrowing.  Moderate to severe right and moderate left neural foraminal narrowing.    Assessment:   The patient is a 68-year-old man with a history of hypertension, CAD, loop recorder, venous insufficiency who presents in referral  from Dr. Crawford for neck pain.    1. Lumbar radiculopathy  E-Consult to Cardiology    Vital signs    Verify informed consent    Notify physician     Notify physician     Notify physician (specify)    Diet NPO    Place sequential compression device    Case Request Operating Room: Injection-steroid-epidural-caudal    Place in Outpatient    alprazolam ODT dissolvable tablet 1 mg      2. Lumbar spondylosis        3. DDD (degenerative disc disease), lumbar        4. Chronic pain disorder        5. Failed back surgical syndrome              Plan:  1. Unfortunately he did not find significant relief with the bilateral L5/S1 TF RIGO.  The way he describes his pain I believe he is still having pain originating from the narrowing at this level.  2. For his continued pain I would like to schedule him for a caudal RIGO.  3. Follow-up 4 weeks post procedure or sooner if needed.  Discussed with him today if he fails to get relief with this our next step is likely a spinal cord stimulator.  We discussed stimulator trial.  Pamphlet for [facility] provided today.

## 2023-08-08 NOTE — H&P (VIEW-ONLY)
This note was completed with dictation software and grammatical errors may exist.    CC:Neck pain, back pain    HPI:  The patient is a 68-year-old man with a history of hypertension, CAD, loop recorder, venous insufficiency who presents in referral from Dr. Crawford for neck pain.  He is status post bilateral L5/S1 RIGO on 07/11/2023 with initially 70% relief lasting roughly 4 weeks and now 0% relief.  Today he is reporting continued lower back pain, 6/10, constant, with radiation into bilateral hips, bilateral buttocks and back of bilateral legs stopping at the knees.  Patient describes his pain is throbbing pain, worsened with physical activities.  He denies any new numbness, weakness or any new changes to his bowel or bladder function.  He has continued to attend formal physical therapy with out significant relief at this time.      Previous history:  The patient is a 64-year-old man with a history of CAD on aspirin, obesity, lumbar stenosis status post surgery, gout who presents in referral from Dr. Crawford for right-sided neck pain. Patient denies any specific trauma, reports having no longstanding history of neck pain.  However about 18 months ago he began having discomfort in the right side of his neck.  It is worse with turning his head side to side especially with right lateral rotation.  It stays in the right neck and radiates up to the occiput but denies any radiation into the parietal region, no headaches with involved with this.  He denies any major radiation into the arms, no numbness or tingling, no weakness in his arms.  He denies any balance issues or dexterity issues.  He denies any major symptoms on the left side.  He describes the pain as aching and dull and sharp, worse with flexion and extension of the neck and lateral rotation, does get some relief with lying down.    Pain intervention history:  He has been doing physical therapy for his neck, has not had much benefit with this.  He does have a  history of lumbar stenosis, had undergone epidural steroid injections and radiofrequency ablation but eventually the pain worsened and he underwent surgery by Dr. Johnson.  He is status post L2-L5 decompression in 2016. He is status post right C5 and C6 medial branch block on 01/17/2020 with greater than 80% relief and then radiofrequency ablation procedure on 02/06/2020 with what he describes as 50% relief of his neck pain.   He is status post bilateral L4 and L5 medial branch radiofrequency ablation on 09/23/2020 with 0% relief.  He is status post bilateral L5/S1 RIGO on 07/11/2023 with initially 70% relief lasting roughly 4 weeks and now 0% relief.    Spine surgeries:    Antineuropathics:  NSAIDs:  Physical therapy:  Antidepressants:  Muscle relaxers:  Opioids:  Antiplatelets/Anticoagulants:    ROS:  He reports easy bruising and back pain.  Balance of review of systems is negative.    Past Medical History:   Diagnosis Date    Amaurosis fugax of right eye 04/2014    resolved    Anticoagulant long-term use     Arm mass, left 06/20/2019    Bilateral chronic knee pain 01/10/2017    Bilateral venous insufficiency     legs    Calculus of gallbladder without mention of cholecystitis or obstruction     Cardiac arrhythmia     has implanted loop recorder, palpitations. Hx dizziness on bending over    Cellulitis and abscess of leg 04/2014    treated at wound care center, resolving    Cervical spondylosis 01/17/2020    Coronary artery disease     denies chest pain, denies MI, no stents; sees Dr Carlson    COVID-19 10/3/22 10/03/2022    Effusion, left knee 05/23/2017    Estrogen excess 08/2017    Generalized osteoarthrosis, involving multiple sites     Hyperlipidemia     Hypertension     Hypertension 10/07/2015    Hypothyroidism     Knee pain     right far worse than left    Low back pain 08/10/2015    Lymphedema 08/01/2021    Malaise and fatigue 05/23/2017    Morbidly obese 10/12/2022    Obesity, unspecified     Occlusion and  stenosis of carotid artery without mention of cerebral infarction     Primary osteoarthritis of right knee 08/2017    Sleep apnea with use of continuous positive airway pressure (CPAP)     Status post placement of implantable loop recorder     Thyroid disease     Transient arterial occlusion of retina     Transient visual loss     BOTH EYE  PER DR CAMPOVERDE  7/27/2015       Past Surgical History:   Procedure Laterality Date    BACK SURGERY N/A     decompression of L2 to L5     CARDIAC SURGERY  2013?    insertion loop recorder, and then removed    CARDIAC SURGERY  2014    transesophageal echo; no clot seen, per patient    CARPAL TUNNEL RELEASE      bilateral    CHOLECYSTECTOMY      COLONOSCOPY  09/26/2016    Dr. Ryley ANDRES Right     HIATAL HERNIA REPAIR  2011    INJECTION OF ANESTHETIC AGENT AROUND MEDIAL BRANCH NERVES INNERVATING CERVICAL FACET JOINT Right 01/17/2020    Procedure: Block-nerve-medial branch-cervical C5, C6;  Surgeon: Kedar Amado MD;  Location: [facility] OR;  Service: Pain Management;  Laterality: Right;    INJECTION OF ANESTHETIC AGENT AROUND MEDIAL BRANCH NERVES INNERVATING LUMBAR FACET JOINT Bilateral 09/11/2020    Procedure: BLOCK, NERVE, FACET JOINT, LUMBAR, MEDIAL BRANCH L4 and L5;  Surgeon: Kedar Amado MD;  Location: [facility] OR;  Service: Pain Management;  Laterality: Bilateral;    injection SI joint  7/201    JOINT REPLACEMENT      right knee   august 2017    KNEE ARTHROSCOPY Bilateral     right x 2; left x 1    LAPAROSCOPIC GASTRIC BANDING      LAPAROSCOPIC GASTRIC BANDING      LHC      LUMBAR EPIDURAL INJECTION      POLYPECTOMY      vocal cord    RADIOFREQUENCY ABLATION OF LUMBAR MEDIAL BRANCH NERVE AT SINGLE LEVEL Bilateral 09/23/2020    Procedure: Radiofrequency Ablation, Nerve, Spinal, Lumbar, Medial Branch L4 and L5;  Surgeon: Kedar Amado MD;  Location: [facility] OR;  Service: Pain Management;  Laterality: Bilateral;    RADIOFREQUENCY THERMAL COAGULATION OF MEDIAL BRANCH  OF POSTERIOR RAMUS OF CERVICAL SPINAL NERVE Right 02/06/2020    Procedure: RADIOFREQUENCY THERMAL COAGULATION, NERVE, SPINAL, CERVICAL, POSTERIOR RAMUS, MEDIAL BRANCH C5, C6;  Surgeon: Kedar Amado MD;  Location: [facility] OR;  Service: Pain Management;  Laterality: Right;    RHIZOTOMY W/ RADIOFREQUENCY ABLATION  2016    lumbar    ROTATOR CUFF REPAIR      right    SURGICAL REMOVAL OF MASS OF UPPER EXTREMITY Left 06/20/2019    Procedure: EXCISION, MASS, UPPER EXTREMITY;  Surgeon: Max Mandel MD;  Location: [facility] OR;  Service: General;  Laterality: Left;    TRANSFORAMINAL EPIDURAL INJECTION OF STEROID Bilateral 7/11/2023    Procedure: Injection,steroid,epidural,transforaminal approach L5/S1;  Surgeon: Kedar Amado MD;  Location: [facility];  Service: Pain Management;  Laterality: Bilateral;    vocal cord surgery  1975       Social History     Socioeconomic History    Marital status:      Spouse name: Nuria    Number of children: 0   Tobacco Use    Smoking status: Never     Passive exposure: Current (one day weekly)    Smokeless tobacco: Never   Substance and Sexual Activity    Alcohol use: Yes     Comment: increased - 3 large drinks nightly    Drug use: Never    Sexual activity: Yes     Partners: Female     Birth control/protection: None     Social Determinants of Health     Financial Resource Strain: Low Risk  (7/3/2023)    Overall Financial Resource Strain (CARDIA)     Difficulty of Paying Living Expenses: Not hard at all   Food Insecurity: No Food Insecurity (7/3/2023)    Hunger Vital Sign     Worried About Running Out of Food in the Last Year: Never true     Ran Out of Food in the Last Year: Never true   Transportation Needs: No Transportation Needs (7/3/2023)    PRAPARE - Transportation     Lack of Transportation (Medical): No     Lack of Transportation (Non-Medical): No   Physical Activity: Sufficiently Active (7/3/2023)    Exercise Vital Sign     Days of Exercise per Week: 3 days     Minutes  "of Exercise per Session: 60 min   Stress: No Stress Concern Present (7/3/2023)    [employer] - Occupational Stress Questionnaire     Feeling of Stress : Only a little   Social Connections: Unknown (7/3/2023)    Social Connection and Isolation Panel [NHANES]     Frequency of Communication with Friends and Family: Three times a week     Frequency of Social Gatherings with Friends and Family: Three times a week     Active Member of Clubs or Organizations: Yes     Attends Club or Organization Meetings: More than 4 times per year     Marital Status:    Housing Stability: Low Risk  (7/3/2023)    Housing Stability Vital Sign     Unable to Pay for Housing in the Last Year: No     Number of Places Lived in the Last Year: 1     Unstable Housing in the Last Year: No         Medications/Allergies: See med card    Vitals:    23 1508 23 1510   BP: (!) 160/70 (!) 124/58   Pulse: 77 76   Weight: (!) 146.6 kg (323 lb 3.1 oz)    Height: 5' 11" (1.803 m)    PainSc:   6    PainLoc: Back            Physical exam:  Gen: A and O x3, pleasant, well-groomed  Skin: No rashes or obvious lesions  HEENT: PERRLA, no obvious deformities on ears or in canals.Trachea midline.  CVS: Regular rate and rhythm, normal palpable pulses.  Resp: Clear to auscultation bilaterally, no wheezes or rales.  Abdomen: Soft, NT/ND.  Musculoskeletal:  No antalgic gait.     Neuro:  Lower extremities: 5/5 strength bilaterally  Reflexes:  Patellar 0+, Achilles 0+ bilaterally.  Sensory:  Intact and symmetrical to light touch and pinprick in L2-S1 dermatomes bilaterally.    Lumbar spine:  Lumbar spine:  Range of motion is mildly reduced with flexion and severely reduced with extension with increased bilateral low back pain during each maneuver.  Oblique extension causes pain on the corresponding side.  Myofascial exam: No tenderness to palpation across lumbar paraspinous muscles.      Imagin19 MRI C-spine:  C2-C3: " Small central disc osteophyte complex.  Minimal left facet hypertrophy.  No significant spinal canal or foraminal stenosis.  C3-C4: Mild disc osteophyte complex with possible tiny central soft disc extrusion.  Moderate right and minimal left facet hypertrophy.  Slight ventral cord flattening with thin sliver preserved ventral and preserved dorsal CSF.  Moderate-severe right and mild-moderate left foraminal stenosis.  C4-C5: Mild disc osteophyte complex with prominent central component likely small central disc extrusion..  Mild-moderate bilateral facet hypertrophy.  Ligamentum flavum thickening.  Mild ventral cord flattening with thin sliver preserved ventral and preserved dorsal CSF.  Mild-moderate bilateral foraminal stenosis.  C5-C6: Moderate disc osteophyte complex with likely small central disc extrusion.  Mild bilateral facet hypertrophy.  Ligamentum flavum thickening.  Moderate cord flattening with effacement of ventral and thin sliver preserved dorsal CSF.  Moderate bilateral foraminal stenosis.  C6-C7: Mild disc osteophyte complex with likely small central disc extrusion.  Preserved ventral and dorsal CSF.  Moderate bilateral foraminal stenosis.  C7-T1: Minimal disc osteophyte complex.  Mild left facet hypertrophy.  No significant spinal canal or foraminal stenosis.    4/15/19 EMG lower ext:   1. Subacute on chronic, moderate to severe, right lumbosacral polyradiculopathy most prominent at the L5 nerve root, with slight active denervation in the L4, L5, and S1 territories.  2. Subacute on chronic, moderate to severe, left lumbosacral polyradiculopathy most prominent of the L5-S1 level, with slight active denervation noted at the L4 level.  3. The symmetric absence of the sural sensory response can be seen in this age group.  The absence of the superficial peroneal sensory response is likely secondary to a more proximal dorsal root ganglion in the setting of L4/5 radiculopathy.There is no convincing evidence  of a peripheral neuropathy, focal neuropathy or plexopathy on this study.  In addition, no myopathic changes were noted.    4/2/19 MRI L-spine:  T12-L1: Mild disc bulge.  Minimal bilateral facet hypertrophy.  Mild narrowing of the right greater than left lateral recess.  No significant overall spinal canal stenosis.  Mild right and minimal left foraminal stenosis.    L1-L2: Mild retrolisthesis.  Mild disc bulge and posterior osteophytic ridging.  Likely small superimposed central/right lateral recess protrusion.  Mild-moderate right facet hypertrophy.  Left facets obscured by artifact.  Mild-moderate narrowing of the bilateral lateral recesses.  Mild overall spinal canal stenosis.  Mild-moderate right and mild left foraminal stenosis.   L2-L3: Left hemilaminectomy.  Mild retrolisthesis.  Spinal canal is patent.  Moderate left and mild-moderate right foraminal stenosis.   L3-L4: Fused.  Mild disc bulge.  Partially obscured bilateral facet hypertrophy, likely moderate on the right greater than left.  Minimal narrowing of bilateral lateral recesses.  No significant overall spinal canal stenosis.  Mild right and minimal left foraminal stenosis.   L4-L5: Fused.  Laminectomy.  Spinal canal is widely patent.  Mild-moderate bilateral foraminal stenosis.   BONES: L2-5 posterior fusion with bilateral pedicle screws and posterior rods with L4 laminectomy and crosslink device at the L4 level.  Left L2-3 hemilaminectomy.  Type 1 endplate changes at L4-5.  No aggressive bone marrow signal.   PARASPINAL AREA: STIR signal hyperintensity in the bilateral dorsal paraspinal muscles from the L3-L5 levels which may represent edema or denervation change.  Bilateral dorsal paraspinal muscular atrophy.  No large fluid collection.    06/19/2023 MRI lumbar spine   There is ferromagnetic artifact related to posterior spinal fusion hardware extending from L2-L5.  There is 5 mm retrolisthesis of L2 on L3 and 4 mm retrolisthesis of L1 on L2.   There is 2 mm retrolisthesis of T12 on L1.  Levoscoliotic curvature centered at L2 and appears similar compared to CT from 04/02/2019.     Paraspinal soft tissues appear normal.  Conus terminates at the L2 vertebral body.  Conus medullaris appears normal.  Normal appearance of the cauda equina.     T12-L1: 3 mm broad-based disc bulge with mild hypertrophic facet changes and thickening of the ligamentum flavum.  Mild central canal narrowing.  Mild-to-moderate right and minimal left foraminal narrowing.     L1-L2: Grade 1 retrolisthesis of L1 on L2.  Small posterior disc osteophyte complex is present.  Hypertrophic facet changes are obscured secondary to ferromagnetic artifact.  There is mild central canal narrowing, worst at the right lateral recess.  Severe right foraminal narrowing.  Moderate left foraminal narrowing.     L2-L3: Grade 1 retrolisthesis of L2 on L3.  Tiny posterior disc osteophyte complex is present.  There is small endplate osteophytes.  Hypertrophic facet changes are obscured secondary to ferromagnetic artifact related to the spinal fusion hardware.  No central canal stenosis.  Moderate to severe bilateral neural foraminal narrowing is present.     L3-L4: No central canal or foraminal narrowing.     L4-L5: Asymmetric posterior disc osteophyte complex on the left is present.  Hypertrophic facet changes are obscured secondary to ferromagnetic artifact related to the posterior spinal fusion hardware.  Minimal residual disc bulge.  There is mild narrowing of the left lateral recess.  Moderate to severe neural foraminal narrowing, worse on the left.     L5-S1: Minimal disc bulge with severe hypertrophic facet changes and thickening of the ligamentum flavum.  Mild central canal narrowing.  Moderate to severe right and moderate left neural foraminal narrowing.    Assessment:   The patient is a 68-year-old man with a history of hypertension, CAD, loop recorder, venous insufficiency who presents in referral  from Dr. Crawford for neck pain.    1. Lumbar radiculopathy  E-Consult to Cardiology    Vital signs    Verify informed consent    Notify physician     Notify physician     Notify physician (specify)    Diet NPO    Place sequential compression device    Case Request Operating Room: Injection-steroid-epidural-caudal    Place in Outpatient    alprazolam ODT dissolvable tablet 1 mg      2. Lumbar spondylosis        3. DDD (degenerative disc disease), lumbar        4. Chronic pain disorder        5. Failed back surgical syndrome              Plan:  1. Unfortunately he did not find significant relief with the bilateral L5/S1 TF RIGO.  The way he describes his pain I believe he is still having pain originating from the narrowing at this level.  2. For his continued pain I would like to schedule him for a caudal RIGO.  3. Follow-up 4 weeks post procedure or sooner if needed.  Discussed with him today if he fails to get relief with this our next step is likely a spinal cord stimulator.  We discussed stimulator trial.  Pamphlet for [employer] provided today.

## 2023-08-09 ENCOUNTER — E-CONSULT (OUTPATIENT)
Dept: CARDIOLOGY | Facility: CLINIC | Age: 68
End: 2023-08-09
Payer: MEDICARE

## 2023-08-09 DIAGNOSIS — I25.10 CORONARY ARTERY DISEASE INVOLVING NATIVE CORONARY ARTERY OF NATIVE HEART WITHOUT ANGINA PECTORIS: ICD-10-CM

## 2023-08-09 DIAGNOSIS — Z01.810 PRE-OPERATIVE CARDIOVASCULAR EXAMINATION: Primary | ICD-10-CM

## 2023-08-09 PROCEDURE — 99451 PR INTERPROF, PHONE/INTERNET/EHR, CONSULT, >= 5MINS: ICD-10-PCS | Mod: S$GLB,,, | Performed by: INTERNAL MEDICINE

## 2023-08-09 PROCEDURE — 99451 NTRPROF PH1/NTRNET/EHR 5/>: CPT | Mod: S$GLB,,, | Performed by: INTERNAL MEDICINE

## 2023-08-09 NOTE — CONSULTS
Page - Cardiology  Response for E-Consult     Patient Name: Arthur Moreno  MRN: [id number]  Primary Care Provider: NEVAEH Dealney Jr., MD   Requesting Provider: Kedar Amado MD  E-Consult to Cardiology  Consult performed by: Cameron Carlson MD  Consult ordered by: Kedar Amado MD          Chart reviewed personally.      As long as no significant chest pain or shortness of breath with exertion, patient is at acceptable risk to proceed from a Cardiology standpoint.    Okay to hold aspirin 5-7 days prior to procedure, restart as soon as safe postprocedure.      Total time of Consultation: 5 minute    I did not speak to the requesting provider verbally about this.     *This eConsult is based on the clinical data available to me and is furnished without benefit of a physical examination. The eConsult will need to be interpreted in light of any clinical issues or changes in patient status not available to me at the time of filing this eConsults. Significant changes in patient condition or level of acuity should result in immediate formal consultation and reevaluation. Please alert me if you have further questions.    Thank you for this eConsult referral.     Cameron Carlson MD  [facility] Cardiology

## 2023-08-10 ENCOUNTER — TELEPHONE (OUTPATIENT)
Dept: PAIN MEDICINE | Facility: CLINIC | Age: 68
End: 2023-08-10
Payer: MEDICARE

## 2023-08-10 NOTE — TELEPHONE ENCOUNTER
Called to inform patient on confirmation to hold aspirin for 5-7 days. Was unable to reach patient left an voicemail with information about the hold and if he had any question to give us an call.

## 2023-08-25 ENCOUNTER — HOSPITAL ENCOUNTER (OUTPATIENT)
Facility: HOSPITAL | Age: 68
Discharge: HOME | End: 2023-08-25
Attending: ANESTHESIOLOGY | Admitting: ANESTHESIOLOGY
Payer: MEDICARE

## 2023-08-25 ENCOUNTER — HOSPITAL ENCOUNTER (OUTPATIENT)
Dept: RADIOLOGY | Facility: HOSPITAL | Age: 68
Discharge: HOME | End: 2023-08-25
Attending: ANESTHESIOLOGY | Admitting: ANESTHESIOLOGY
Payer: MEDICARE

## 2023-08-25 VITALS
HEART RATE: 72 BPM | TEMPERATURE: 98 F | SYSTOLIC BLOOD PRESSURE: 134 MMHG | DIASTOLIC BLOOD PRESSURE: 60 MMHG | OXYGEN SATURATION: 95 % | RESPIRATION RATE: 16 BRPM

## 2023-08-25 DIAGNOSIS — M54.50 LOWER BACK PAIN: ICD-10-CM

## 2023-08-25 DIAGNOSIS — M54.16 LUMBAR RADICULOPATHY: ICD-10-CM

## 2023-08-25 PROCEDURE — 25500020 PHARM REV CODE 255: Mod: PO | Performed by: ANESTHESIOLOGY

## 2023-08-25 PROCEDURE — 62323 NJX INTERLAMINAR LMBR/SAC: CPT | Mod: ,,, | Performed by: ANESTHESIOLOGY

## 2023-08-25 PROCEDURE — 36000704 HC OR TIME LEV I 1ST 15 MIN: Mod: PO | Performed by: ANESTHESIOLOGY

## 2023-08-25 PROCEDURE — 62323 NJX INTERLAMINAR LMBR/SAC: CPT | Mod: PO | Performed by: ANESTHESIOLOGY

## 2023-08-25 PROCEDURE — 63600175 PHARM REV CODE 636 W HCPCS: Mod: PO | Performed by: ANESTHESIOLOGY

## 2023-08-25 PROCEDURE — 76000 FLUOROSCOPY <1 HR PHYS/QHP: CPT | Mod: TC,PO

## 2023-08-25 PROCEDURE — 25000003 PHARM REV CODE 250: Mod: PO | Performed by: ANESTHESIOLOGY

## 2023-08-25 PROCEDURE — A4216 STERILE WATER/SALINE, 10 ML: HCPCS | Mod: PO | Performed by: ANESTHESIOLOGY

## 2023-08-25 PROCEDURE — 62323 PR INJ LUMBAR/SACRAL, W/IMAGING GUIDANCE: ICD-10-PCS | Mod: ,,, | Performed by: ANESTHESIOLOGY

## 2023-08-25 RX ORDER — LIDOCAINE HYDROCHLORIDE 10 MG/ML
INJECTION, SOLUTION EPIDURAL; INFILTRATION; INTRACAUDAL; PERINEURAL
Status: DISCONTINUED | OUTPATIENT
Start: 2023-08-25 | End: 2023-08-25 | Stop reason: HOSPADM

## 2023-08-25 RX ORDER — ALPRAZOLAM 0.5 MG/1
1 TABLET, ORALLY DISINTEGRATING ORAL ONCE AS NEEDED
Status: COMPLETED | OUTPATIENT
Start: 2023-08-25 | End: 2023-08-25

## 2023-08-25 RX ORDER — METHYLPREDNISOLONE ACETATE 80 MG/ML
INJECTION, SUSPENSION INTRA-ARTICULAR; INTRALESIONAL; INTRAMUSCULAR; SOFT TISSUE
Status: DISCONTINUED | OUTPATIENT
Start: 2023-08-25 | End: 2023-08-25 | Stop reason: HOSPADM

## 2023-08-25 RX ORDER — SODIUM CHLORIDE 9 MG/ML
INJECTION, SOLUTION INTRAMUSCULAR; INTRAVENOUS; SUBCUTANEOUS
Status: DISCONTINUED | OUTPATIENT
Start: 2023-08-25 | End: 2023-08-25 | Stop reason: HOSPADM

## 2023-08-25 RX ADMIN — ALPRAZOLAM 1 MG: 0.5 TABLET, ORALLY DISINTEGRATING ORAL at 09:08

## 2023-08-25 NOTE — OP NOTE
PROCEDURE DATE: 8/25/2023    PROCEDURE:  Caudal epidural steroid injection under fluoroscopy.    Diagnosis: Lumbar radiculopathy    Post Op diagnosis: Same    PHYSICIAN: Kedar Amado M.D.    MEDICATIONS INJECTED:  80 mg of methylprednisone and 4 ml of sterile, preservative-free NaCl.    LOCAL ANESTHETIC GIVEN:  Lidocaine 1%, 4 ml total    SEDATION MEDICATIONS: none    ESTIMATED BLOOD LOSS:  none    COMPLICATIONS:  none    TECHNIQUE:   After the patient was placed in prone position, the patient was prepped and draped in the usual sterile fashion using ChloraPrep and sterile towels.  Appropriate anatomic landmarks were determined by identifying the sacral hiatus in the lateral fluoroscopic view.  Local anesthetic was given via a 25g 1.5 inch needle by raising a wheal and infiltrating down to the periosteum.  A 3.5 inch 22 gauge needle was introduced thru the sacral hiatus.  Omnipaque was injected to confirm placement in the appropriate area and that there was no vascular uptake.  The medication was then injected slowly.  The patient tolerated the procedure well.    The patient was monitored after the procedure.  Patient was given post procedure and discharge instructions to follow at home.  The patient was discharged in a stable condition

## 2023-08-25 NOTE — DISCHARGE SUMMARY
[facility] - Surgery  Discharge Note  Short Stay    Procedure(s) (LRB):  Injection-steroid-epidural-caudal (N/A)      OUTCOME: Patient tolerated treatment/procedure well without complication and is now ready for discharge.    DISPOSITION: Home or Self Care    FINAL DIAGNOSIS:  Lumbar radiculopathy    FOLLOWUP: In clinic    DISCHARGE INSTRUCTIONS:    Discharge Procedure Orders   Diet Adult Regular     No dressing needed     Notify your health care provider if you experience any of the following:  temperature >100.4     Activity as tolerated

## 2023-09-19 ENCOUNTER — OFFICE VISIT (OUTPATIENT)
Dept: PAIN MEDICINE | Facility: CLINIC | Age: 68
End: 2023-09-19
Payer: MEDICARE

## 2023-09-19 VITALS
DIASTOLIC BLOOD PRESSURE: 60 MMHG | BODY MASS INDEX: 44.1 KG/M2 | SYSTOLIC BLOOD PRESSURE: 131 MMHG | WEIGHT: 315 LBS | HEART RATE: 79 BPM | HEIGHT: 71 IN

## 2023-09-19 DIAGNOSIS — M54.16 LUMBAR RADICULITIS: ICD-10-CM

## 2023-09-19 DIAGNOSIS — G89.4 CHRONIC PAIN DISORDER: Primary | ICD-10-CM

## 2023-09-19 PROCEDURE — 3078F PR MOST RECENT DIASTOLIC BLOOD PRESSURE < 80 MM HG: ICD-10-PCS | Mod: CPTII,S$GLB,, | Performed by: PHYSICIAN ASSISTANT

## 2023-09-19 PROCEDURE — 99214 OFFICE O/P EST MOD 30 MIN: CPT | Mod: S$GLB,,, | Performed by: PHYSICIAN ASSISTANT

## 2023-09-19 PROCEDURE — 1159F PR MEDICATION LIST DOCUMENTED IN MEDICAL RECORD: ICD-10-PCS | Mod: CPTII,S$GLB,, | Performed by: PHYSICIAN ASSISTANT

## 2023-09-19 PROCEDURE — 1160F PR REVIEW ALL MEDS BY PRESCRIBER/CLIN PHARMACIST DOCUMENTED: ICD-10-PCS | Mod: CPTII,S$GLB,, | Performed by: PHYSICIAN ASSISTANT

## 2023-09-19 PROCEDURE — 1101F PR PT FALLS ASSESS DOC 0-1 FALLS W/OUT INJ PAST YR: ICD-10-PCS | Mod: CPTII,S$GLB,, | Performed by: PHYSICIAN ASSISTANT

## 2023-09-19 PROCEDURE — 1125F AMNT PAIN NOTED PAIN PRSNT: CPT | Mod: CPTII,S$GLB,, | Performed by: PHYSICIAN ASSISTANT

## 2023-09-19 PROCEDURE — 3075F PR MOST RECENT SYSTOLIC BLOOD PRESS GE 130-139MM HG: ICD-10-PCS | Mod: CPTII,S$GLB,, | Performed by: PHYSICIAN ASSISTANT

## 2023-09-19 PROCEDURE — 1159F MED LIST DOCD IN RCRD: CPT | Mod: CPTII,S$GLB,, | Performed by: PHYSICIAN ASSISTANT

## 2023-09-19 PROCEDURE — 4010F ACE/ARB THERAPY RXD/TAKEN: CPT | Mod: CPTII,S$GLB,, | Performed by: PHYSICIAN ASSISTANT

## 2023-09-19 PROCEDURE — 1101F PT FALLS ASSESS-DOCD LE1/YR: CPT | Mod: CPTII,S$GLB,, | Performed by: PHYSICIAN ASSISTANT

## 2023-09-19 PROCEDURE — 99999 PR PBB SHADOW E&M-EST. PATIENT-LVL IV: ICD-10-PCS | Mod: PBBFAC,,, | Performed by: PHYSICIAN ASSISTANT

## 2023-09-19 PROCEDURE — 4010F PR ACE/ARB THEARPY RXD/TAKEN: ICD-10-PCS | Mod: CPTII,S$GLB,, | Performed by: PHYSICIAN ASSISTANT

## 2023-09-19 PROCEDURE — 3288F FALL RISK ASSESSMENT DOCD: CPT | Mod: CPTII,S$GLB,, | Performed by: PHYSICIAN ASSISTANT

## 2023-09-19 PROCEDURE — 3044F PR MOST RECENT HEMOGLOBIN A1C LEVEL <7.0%: ICD-10-PCS | Mod: CPTII,S$GLB,, | Performed by: PHYSICIAN ASSISTANT

## 2023-09-19 PROCEDURE — 3008F BODY MASS INDEX DOCD: CPT | Mod: CPTII,S$GLB,, | Performed by: PHYSICIAN ASSISTANT

## 2023-09-19 PROCEDURE — 1125F PR PAIN SEVERITY QUANTIFIED, PAIN PRESENT: ICD-10-PCS | Mod: CPTII,S$GLB,, | Performed by: PHYSICIAN ASSISTANT

## 2023-09-19 PROCEDURE — 3008F PR BODY MASS INDEX (BMI) DOCUMENTED: ICD-10-PCS | Mod: CPTII,S$GLB,, | Performed by: PHYSICIAN ASSISTANT

## 2023-09-19 PROCEDURE — 1160F RVW MEDS BY RX/DR IN RCRD: CPT | Mod: CPTII,S$GLB,, | Performed by: PHYSICIAN ASSISTANT

## 2023-09-19 PROCEDURE — 3078F DIAST BP <80 MM HG: CPT | Mod: CPTII,S$GLB,, | Performed by: PHYSICIAN ASSISTANT

## 2023-09-19 PROCEDURE — 3075F SYST BP GE 130 - 139MM HG: CPT | Mod: CPTII,S$GLB,, | Performed by: PHYSICIAN ASSISTANT

## 2023-09-19 PROCEDURE — 3288F PR FALLS RISK ASSESSMENT DOCUMENTED: ICD-10-PCS | Mod: CPTII,S$GLB,, | Performed by: PHYSICIAN ASSISTANT

## 2023-09-19 PROCEDURE — 99214 PR OFFICE/OUTPT VISIT, EST, LEVL IV, 30-39 MIN: ICD-10-PCS | Mod: S$GLB,,, | Performed by: PHYSICIAN ASSISTANT

## 2023-09-19 PROCEDURE — 99999 PR PBB SHADOW E&M-EST. PATIENT-LVL IV: CPT | Mod: PBBFAC,,, | Performed by: PHYSICIAN ASSISTANT

## 2023-09-19 PROCEDURE — 3044F HG A1C LEVEL LT 7.0%: CPT | Mod: CPTII,S$GLB,, | Performed by: PHYSICIAN ASSISTANT

## 2023-09-24 NOTE — PROGRESS NOTES
This note was completed with dictation software and grammatical errors may exist.    CC:Neck pain, back pain    HPI:  The patient is a 68-year-old man with a history of hypertension, CAD, loop recorder, venous insufficiency who presents in referral from Dr. Crawford for neck pain.  He is status post caudal epidural steroid injection on 08/25/2023 with minimal relief.  Continues to have pain radiating across his low back radiating to the buttocks and posterior thighs to his knees.  The pain is severe with walking, improved with sitting.  He would like to proceed with a spinal cord stimulator trial since he has not had relief with conservative treatment.  He denies weakness or numbness.    Previous history:  The patient is a 64-year-old man with a history of CAD on aspirin, obesity, lumbar stenosis status post surgery, gout who presents in referral from Dr. Crawford for right-sided neck pain. Patient denies any specific trauma, reports having no longstanding history of neck pain.  However about 18 months ago he began having discomfort in the right side of his neck.  It is worse with turning his head side to side especially with right lateral rotation.  It stays in the right neck and radiates up to the occiput but denies any radiation into the parietal region, no headaches with involved with this.  He denies any major radiation into the arms, no numbness or tingling, no weakness in his arms.  He denies any balance issues or dexterity issues.  He denies any major symptoms on the left side.  He describes the pain as aching and dull and sharp, worse with flexion and extension of the neck and lateral rotation, does get some relief with lying down.    Pain intervention history:  He has been doing physical therapy for his neck, has not had much benefit with this.  He does have a history of lumbar stenosis, had undergone epidural steroid injections and radiofrequency ablation but eventually the pain worsened and he underwent  surgery by Dr. Johnson.  He is status post L2-L5 decompression in 2016. He is status post right C5 and C6 medial branch block on 01/17/2020 with greater than 80% relief and then radiofrequency ablation procedure on 02/06/2020 with what he describes as 50% relief of his neck pain.   He is status post bilateral L4 and L5 medial branch radiofrequency ablation on 09/23/2020 with 0% relief.  He is status post bilateral L5/S1 RIGO on 07/11/2023 with initially 70% relief lasting roughly 4 weeks and now 0% relief.    Spine surgeries:    Antineuropathics:  NSAIDs:  Physical therapy:  Antidepressants:  Muscle relaxers:  Opioids:  Antiplatelets/Anticoagulants:    ROS:  He reports easy bruising and back pain.  Balance of review of systems is negative.    Past Medical History:   Diagnosis Date    Amaurosis fugax of right eye 04/2014    resolved    Anticoagulant long-term use     Arm mass, left 06/20/2019    Bilateral chronic knee pain 01/10/2017    Bilateral venous insufficiency     legs    Calculus of gallbladder without mention of cholecystitis or obstruction     Cardiac arrhythmia     has implanted loop recorder, palpitations. Hx dizziness on bending over    Cellulitis and abscess of leg 04/2014    treated at wound care center, resolving    Cervical spondylosis 01/17/2020    Coronary artery disease     denies chest pain, denies MI, no stents; sees Dr Carlson    COVID-19 10/3/22 10/03/2022    Effusion, left knee 05/23/2017    Estrogen excess 08/2017    Generalized osteoarthrosis, involving multiple sites     Hyperlipidemia     Hypertension     Hypertension 10/07/2015    Hypothyroidism     Knee pain     right far worse than left    Low back pain 08/10/2015    Lymphedema 08/01/2021    Malaise and fatigue 05/23/2017    Morbidly obese 10/12/2022    Obesity, unspecified     Occlusion and stenosis of carotid artery without mention of cerebral infarction     Primary osteoarthritis of right knee 08/2017    Sleep apnea with use of  continuous positive airway pressure (CPAP)     Status post placement of implantable loop recorder     Thyroid disease     Transient arterial occlusion of retina     Transient visual loss     BOTH EYE  PER DR CAMPOVERDE  7/27/2015       Past Surgical History:   Procedure Laterality Date    BACK SURGERY N/A     decompression of L2 to L5     CARDIAC SURGERY  2013?    insertion loop recorder, and then removed    CARDIAC SURGERY  2014    transesophageal echo; no clot seen, per patient    CARPAL TUNNEL RELEASE      bilateral    CAUDAL EPIDURAL STEROID INJECTION N/A 8/25/2023    Procedure: Injection-steroid-epidural-caudal;  Surgeon: Kedar Amado MD;  Location: [facility] OR;  Service: Pain Management;  Laterality: N/A;    CHOLECYSTECTOMY      COLONOSCOPY  09/26/2016    Dr. Ryley ANDRES Right     HIATAL HERNIA REPAIR  2011    INJECTION OF ANESTHETIC AGENT AROUND MEDIAL BRANCH NERVES INNERVATING CERVICAL FACET JOINT Right 01/17/2020    Procedure: Block-nerve-medial branch-cervical C5, C6;  Surgeon: Kedar Amado MD;  Location: [facility] OR;  Service: Pain Management;  Laterality: Right;    INJECTION OF ANESTHETIC AGENT AROUND MEDIAL BRANCH NERVES INNERVATING LUMBAR FACET JOINT Bilateral 09/11/2020    Procedure: BLOCK, NERVE, FACET JOINT, LUMBAR, MEDIAL BRANCH L4 and L5;  Surgeon: Kedar Amado MD;  Location: [facility] OR;  Service: Pain Management;  Laterality: Bilateral;    injection SI joint  7/201    JOINT REPLACEMENT      right knee   august 2017    KNEE ARTHROSCOPY Bilateral     right x 2; left x 1    LAPAROSCOPIC GASTRIC BANDING      LAPAROSCOPIC GASTRIC BANDING      LHC      LUMBAR EPIDURAL INJECTION      POLYPECTOMY      vocal cord    RADIOFREQUENCY ABLATION OF LUMBAR MEDIAL BRANCH NERVE AT SINGLE LEVEL Bilateral 09/23/2020    Procedure: Radiofrequency Ablation, Nerve, Spinal, Lumbar, Medial Branch L4 and L5;  Surgeon: Kedar Amado MD;  Location: [facility] OR;  Service: Pain Management;  Laterality:  Bilateral;    RADIOFREQUENCY THERMAL COAGULATION OF MEDIAL BRANCH OF POSTERIOR RAMUS OF CERVICAL SPINAL NERVE Right 02/06/2020    Procedure: RADIOFREQUENCY THERMAL COAGULATION, NERVE, SPINAL, CERVICAL, POSTERIOR RAMUS, MEDIAL BRANCH C5, C6;  Surgeon: Kedar Amado MD;  Location: [facility] OR;  Service: Pain Management;  Laterality: Right;    RHIZOTOMY W/ RADIOFREQUENCY ABLATION  2016    lumbar    ROTATOR CUFF REPAIR      right    SURGICAL REMOVAL OF MASS OF UPPER EXTREMITY Left 06/20/2019    Procedure: EXCISION, MASS, UPPER EXTREMITY;  Surgeon: Max Mandel MD;  Location: [facility] OR;  Service: General;  Laterality: Left;    TRANSFORAMINAL EPIDURAL INJECTION OF STEROID Bilateral 7/11/2023    Procedure: Injection,steroid,epidural,transforaminal approach L5/S1;  Surgeon: Kedar Amado MD;  Location: [facility];  Service: Pain Management;  Laterality: Bilateral;    vocal cord surgery  1975       Social History     Socioeconomic History    Marital status:      Spouse name: Nuria    Number of children: 0   Tobacco Use    Smoking status: Never     Passive exposure: Current (one day weekly)    Smokeless tobacco: Never   Substance and Sexual Activity    Alcohol use: Yes     Comment: increased - 3 large drinks nightly    Drug use: Never    Sexual activity: Yes     Partners: Female     Birth control/protection: None     Social Determinants of Health     Financial Resource Strain: Low Risk  (7/3/2023)    Overall Financial Resource Strain (CARDIA)     Difficulty of Paying Living Expenses: Not hard at all   Food Insecurity: No Food Insecurity (7/3/2023)    Hunger Vital Sign     Worried About Running Out of Food in the Last Year: Never true     Ran Out of Food in the Last Year: Never true   Transportation Needs: No Transportation Needs (7/3/2023)    PRAPARE - Transportation     Lack of Transportation (Medical): No     Lack of Transportation (Non-Medical): No   Physical Activity: Sufficiently Active (7/3/2023)     "Exercise Vital Sign     Days of Exercise per Week: 3 days     Minutes of Exercise per Session: 60 min   Stress: No Stress Concern Present (7/3/2023)    [employer] [place] [employer] - Occupational Stress Questionnaire     Feeling of Stress : Only a little   Social Connections: Unknown (7/3/2023)    Social Connection and Isolation Panel [NHANES]     Frequency of Communication with Friends and Family: Three times a week     Frequency of Social Gatherings with Friends and Family: Three times a week     Active Member of Clubs or Organizations: Yes     Attends Club or Organization Meetings: More than 4 times per year     Marital Status:    Housing Stability: Low Risk  (7/3/2023)    Housing Stability Vital Sign     Unable to Pay for Housing in the Last Year: No     Number of Places Lived in the Last Year: 1     Unstable Housing in the Last Year: No         Medications/Allergies: See med card    Vitals:    09/19/23 1002   BP: 131/60   Pulse: 79   Weight: (!) 143.1 kg (315 lb 7.7 oz)   Height: 5' 11" (1.803 m)   PainSc:   6   PainLoc: Back           Physical exam:  Gen: A and O x3, pleasant, well-groomed  Skin: No rashes or obvious lesions  HEENT: PERRLA, no obvious deformities on ears or in canals.Trachea midline.  CVS: Regular rate and rhythm, normal palpable pulses.  Resp: Clear to auscultation bilaterally, no wheezes or rales.  Abdomen: Soft, NT/ND.  Musculoskeletal:  No antalgic gait.     Neuro:  Lower extremities: 5/5 strength bilaterally  Reflexes:  Patellar 0+, Achilles 0+ bilaterally.  Sensory:  Intact and symmetrical to light touch and pinprick in L2-S1 dermatomes bilaterally.    Lumbar spine:  Lumbar spine:  Range of motion is mildly reduced with flexion and severely reduced with extension with increased bilateral low back pain during each maneuver.  Oblique extension causes pain on the corresponding side.  Myofascial exam: No tenderness to palpation across lumbar paraspinous " muscles.      Imagin19 MRI C-spine:  C2-C3: Small central disc osteophyte complex.  Minimal left facet hypertrophy.  No significant spinal canal or foraminal stenosis.  C3-C4: Mild disc osteophyte complex with possible tiny central soft disc extrusion.  Moderate right and minimal left facet hypertrophy.  Slight ventral cord flattening with thin sliver preserved ventral and preserved dorsal CSF.  Moderate-severe right and mild-moderate left foraminal stenosis.  C4-C5: Mild disc osteophyte complex with prominent central component likely small central disc extrusion..  Mild-moderate bilateral facet hypertrophy.  Ligamentum flavum thickening.  Mild ventral cord flattening with thin sliver preserved ventral and preserved dorsal CSF.  Mild-moderate bilateral foraminal stenosis.  C5-C6: Moderate disc osteophyte complex with likely small central disc extrusion.  Mild bilateral facet hypertrophy.  Ligamentum flavum thickening.  Moderate cord flattening with effacement of ventral and thin sliver preserved dorsal CSF.  Moderate bilateral foraminal stenosis.  C6-C7: Mild disc osteophyte complex with likely small central disc extrusion.  Preserved ventral and dorsal CSF.  Moderate bilateral foraminal stenosis.  C7-T1: Minimal disc osteophyte complex.  Mild left facet hypertrophy.  No significant spinal canal or foraminal stenosis.    4/15/19 EMG lower ext:   1. Subacute on chronic, moderate to severe, right lumbosacral polyradiculopathy most prominent at the L5 nerve root, with slight active denervation in the L4, L5, and S1 territories.  2. Subacute on chronic, moderate to severe, left lumbosacral polyradiculopathy most prominent of the L5-S1 level, with slight active denervation noted at the L4 level.  3. The symmetric absence of the sural sensory response can be seen in this age group.  The absence of the superficial peroneal sensory response is likely secondary to a more proximal dorsal root ganglion in the setting  of L4/5 radiculopathy.There is no convincing evidence of a peripheral neuropathy, focal neuropathy or plexopathy on this study.  In addition, no myopathic changes were noted.    4/2/19 MRI L-spine:  T12-L1: Mild disc bulge.  Minimal bilateral facet hypertrophy.  Mild narrowing of the right greater than left lateral recess.  No significant overall spinal canal stenosis.  Mild right and minimal left foraminal stenosis.    L1-L2: Mild retrolisthesis.  Mild disc bulge and posterior osteophytic ridging.  Likely small superimposed central/right lateral recess protrusion.  Mild-moderate right facet hypertrophy.  Left facets obscured by artifact.  Mild-moderate narrowing of the bilateral lateral recesses.  Mild overall spinal canal stenosis.  Mild-moderate right and mild left foraminal stenosis.   L2-L3: Left hemilaminectomy.  Mild retrolisthesis.  Spinal canal is patent.  Moderate left and mild-moderate right foraminal stenosis.   L3-L4: Fused.  Mild disc bulge.  Partially obscured bilateral facet hypertrophy, likely moderate on the right greater than left.  Minimal narrowing of bilateral lateral recesses.  No significant overall spinal canal stenosis.  Mild right and minimal left foraminal stenosis.   L4-L5: Fused.  Laminectomy.  Spinal canal is widely patent.  Mild-moderate bilateral foraminal stenosis.   BONES: L2-5 posterior fusion with bilateral pedicle screws and posterior rods with L4 laminectomy and crosslink device at the L4 level.  Left L2-3 hemilaminectomy.  Type 1 endplate changes at L4-5.  No aggressive bone marrow signal.   PARASPINAL AREA: STIR signal hyperintensity in the bilateral dorsal paraspinal muscles from the L3-L5 levels which may represent edema or denervation change.  Bilateral dorsal paraspinal muscular atrophy.  No large fluid collection.    06/19/2023 MRI lumbar spine   There is ferromagnetic artifact related to posterior spinal fusion hardware extending from L2-L5.  There is 5 mm  retrolisthesis of L2 on L3 and 4 mm retrolisthesis of L1 on L2.  There is 2 mm retrolisthesis of T12 on L1.  Levoscoliotic curvature centered at L2 and appears similar compared to CT from 04/02/2019.     Paraspinal soft tissues appear normal.  Conus terminates at the L2 vertebral body.  Conus medullaris appears normal.  Normal appearance of the cauda equina.     T12-L1: 3 mm broad-based disc bulge with mild hypertrophic facet changes and thickening of the ligamentum flavum.  Mild central canal narrowing.  Mild-to-moderate right and minimal left foraminal narrowing.     L1-L2: Grade 1 retrolisthesis of L1 on L2.  Small posterior disc osteophyte complex is present.  Hypertrophic facet changes are obscured secondary to ferromagnetic artifact.  There is mild central canal narrowing, worst at the right lateral recess.  Severe right foraminal narrowing.  Moderate left foraminal narrowing.     L2-L3: Grade 1 retrolisthesis of L2 on L3.  Tiny posterior disc osteophyte complex is present.  There is small endplate osteophytes.  Hypertrophic facet changes are obscured secondary to ferromagnetic artifact related to the spinal fusion hardware.  No central canal stenosis.  Moderate to severe bilateral neural foraminal narrowing is present.     L3-L4: No central canal or foraminal narrowing.     L4-L5: Asymmetric posterior disc osteophyte complex on the left is present.  Hypertrophic facet changes are obscured secondary to ferromagnetic artifact related to the posterior spinal fusion hardware.  Minimal residual disc bulge.  There is mild narrowing of the left lateral recess.  Moderate to severe neural foraminal narrowing, worse on the left.     L5-S1: Minimal disc bulge with severe hypertrophic facet changes and thickening of the ligamentum flavum.  Mild central canal narrowing.  Moderate to severe right and moderate left neural foraminal narrowing.    Assessment:   The patient is a 68-year-old man with a history of hypertension,  CAD, loop recorder, venous insufficiency who presents in referral from Dr. Crawford for neck pain.    1. Chronic pain disorder  Ambulatory referral/consult to Psychology      2. Lumbar radiculitis              Plan:  1. Since he has failed conservative treatment and he would like to avoid any further surgery, we will proceed with a psychological evaluation and a spinal cord stimulator trial with [employer].  If successful we will proceed with an implant.  We discussed the risks involved and I have ordered a back brace with lateral support.  2. Follow-up 5 days postop or sooner as needed.

## 2023-09-26 ENCOUNTER — PATIENT MESSAGE (OUTPATIENT)
Dept: PAIN MEDICINE | Facility: CLINIC | Age: 68
End: 2023-09-26
Payer: MEDICARE

## 2023-10-05 ENCOUNTER — TELEPHONE (OUTPATIENT)
Dept: PSYCHIATRY | Facility: CLINIC | Age: 68
End: 2023-10-05
Payer: MEDICARE

## 2023-10-18 ENCOUNTER — OFFICE VISIT (OUTPATIENT)
Dept: PSYCHIATRY | Facility: CLINIC | Age: 68
End: 2023-10-18
Payer: MEDICARE

## 2023-10-18 DIAGNOSIS — M25.551 PAIN OF BOTH HIP JOINTS: ICD-10-CM

## 2023-10-18 DIAGNOSIS — M47.816 LUMBAR SPONDYLOSIS: ICD-10-CM

## 2023-10-18 DIAGNOSIS — G89.4 CHRONIC PAIN DISORDER: ICD-10-CM

## 2023-10-18 DIAGNOSIS — M25.552 PAIN OF BOTH HIP JOINTS: ICD-10-CM

## 2023-10-18 DIAGNOSIS — Z01.818 PREOPERATIVE EVALUATION TO RULE OUT SURGICAL CONTRAINDICATION: Primary | ICD-10-CM

## 2023-10-18 PROCEDURE — 90791 PSYCH DIAGNOSTIC EVALUATION: CPT | Mod: 95,,, | Performed by: PSYCHOLOGIST

## 2023-10-18 PROCEDURE — 4010F ACE/ARB THERAPY RXD/TAKEN: CPT | Mod: CPTII,95,, | Performed by: PSYCHOLOGIST

## 2023-10-18 PROCEDURE — 4010F PR ACE/ARB THEARPY RXD/TAKEN: ICD-10-PCS | Mod: CPTII,95,, | Performed by: PSYCHOLOGIST

## 2023-10-18 PROCEDURE — 96131 PSYCL TST EVAL PHYS/QHP EA: CPT | Mod: 95,,, | Performed by: PSYCHOLOGIST

## 2023-10-18 PROCEDURE — 96131 PR PSYCHOLOGIC TEST EVAL SVCS, EA ADDTL HR: ICD-10-PCS | Mod: 95,,, | Performed by: PSYCHOLOGIST

## 2023-10-18 PROCEDURE — 96130 PR PSYCHOLOGIC TEST EVAL SVCS, 1ST HR: ICD-10-PCS | Mod: 95,,, | Performed by: PSYCHOLOGIST

## 2023-10-18 PROCEDURE — 3044F HG A1C LEVEL LT 7.0%: CPT | Mod: CPTII,95,, | Performed by: PSYCHOLOGIST

## 2023-10-18 PROCEDURE — 96139 PR PSYCH/NEUROPSYCH TEST ADMIN/SCORING, BY TECH, 2+ TESTS, EA ADDTL 30 MIN: ICD-10-PCS | Mod: 95,,, | Performed by: PSYCHOLOGIST

## 2023-10-18 PROCEDURE — 90791 PR PSYCHIATRIC DIAGNOSTIC EVALUATION: ICD-10-PCS | Mod: 95,,, | Performed by: PSYCHOLOGIST

## 2023-10-18 PROCEDURE — 96138 PR PSYCH/NEUROPSYCH TEST ADMIN/SCORING, BY TECH, 2+ TESTS, 1ST 30 MIN: ICD-10-PCS | Mod: 95,,, | Performed by: PSYCHOLOGIST

## 2023-10-18 PROCEDURE — 96139 PSYCL/NRPSYC TST TECH EA: CPT | Mod: 95,,, | Performed by: PSYCHOLOGIST

## 2023-10-18 PROCEDURE — 96130 PSYCL TST EVAL PHYS/QHP 1ST: CPT | Mod: 95,,, | Performed by: PSYCHOLOGIST

## 2023-10-18 PROCEDURE — 3044F PR MOST RECENT HEMOGLOBIN A1C LEVEL <7.0%: ICD-10-PCS | Mod: CPTII,95,, | Performed by: PSYCHOLOGIST

## 2023-10-18 PROCEDURE — 1159F PR MEDICATION LIST DOCUMENTED IN MEDICAL RECORD: ICD-10-PCS | Mod: CPTII,95,, | Performed by: PSYCHOLOGIST

## 2023-10-18 PROCEDURE — 96138 PSYCL/NRPSYC TECH 1ST: CPT | Mod: 95,,, | Performed by: PSYCHOLOGIST

## 2023-10-18 PROCEDURE — 1159F MED LIST DOCD IN RCRD: CPT | Mod: CPTII,95,, | Performed by: PSYCHOLOGIST

## 2023-10-25 NOTE — PSYCH TESTING
[facility] [address] [place] [address]  [phone]    REPORT OF PSYCHOLOGICAL TESTING    NAME: Arthur Moreno  MRN: [id number]   : 1955    REFERRED BY: SHANICE Giraldo    REASON FOR REFERRAL:  Psychological Evaluation prior to surgical implantation of a spinal cord stimulator (SCS) to address chronic pain    EVALUATED BY:  Saurav Girard, Ph.D., Clinical Psychologist  AUREA Hough, Psychometrician    DATES OF EVALUATION: 10/10/2023 and 10/18/2023    EVALUATION PROCEDURES AND TIMES:  Conducted by Psychologist:  Integration of patient data, interpretation of standardized test results and clinical data, clinical decision-making, treatment planning and report, and interactive feedback to the patient  CPT Codes:  47470 - 1 hour; 42547 - 1 hour  Conducted by Technician:  Psychological test administration and scoring by technician, two or more tests, any method:  [place] Multiphasic Personality Inventory - 3 (MMPI-3); Pain and Impairment Relationship Scale (PAIRS); [place] Pain Catastrophizing Scale (PCS)  CPT Codes:  49939 - 30 minutes; 03355 - 30 minutes, 53116 - 30 minutes, 36876 - 30 minutes, 69953 - 30 minutes    EVALUATION FINDINGS:  The diagnostic interview revealed that Mr. Moreno is a 68 year-old cisgender male referred for psychological evaluation prior to surgical implantation of a spinal cord stimulator (SCS) to address chronic pain in his knee up to his neck. Mr. Moreno reported that his pain has been present for about three years and progressively worsened over time. His activities are greatly limited, and he has tried numerous pain treatments without success. Mr. Moreno is now interested in a trial of SCS. He understood risks of surgery and indicated no significant concerns. He has reasonable expectations for SCS and will consider other treatment options in the future if SCS is ineffective. Mr. Moreno does not meet criteria for any DSM-5 psychiatric  diagnoses at this time, and he denied history of mental health disorder.      He denied problems with substance abuse, suicidal or homicidal ideation, psychotic symptoms, and cognitive functioning.    The medical record also revealed the following diagnoses relevant to this evaluation:    Past Medical History:   Diagnosis Date    Amaurosis fugax of right eye 04/2014    resolved    Anticoagulant long-term use     Arm mass, left 06/20/2019    Bilateral chronic knee pain 01/10/2017    Bilateral venous insufficiency     legs    Calculus of gallbladder without mention of cholecystitis or obstruction     Cardiac arrhythmia     has implanted loop recorder, palpitations. Hx dizziness on bending over    Cellulitis and abscess of leg 04/2014    treated at wound care center, resolving    Cervical spondylosis 01/17/2020    Coronary artery disease     denies chest pain, denies MI, no stents; sees Dr Aldo PARSONSID-19 10/3/22 10/03/2022    Effusion, left knee 05/23/2017    Estrogen excess 08/2017    Generalized osteoarthrosis, involving multiple sites     Hyperlipidemia     Hypertension     Hypertension 10/07/2015    Hypothyroidism     Knee pain     right far worse than left    Low back pain 08/10/2015    Lymphedema 08/01/2021    Malaise and fatigue 05/23/2017    Morbidly obese 10/12/2022    Obesity, unspecified     Occlusion and stenosis of carotid artery without mention of cerebral infarction     Primary osteoarthritis of right knee 08/2017    Sleep apnea with use of continuous positive airway pressure (CPAP)     Status post placement of implantable loop recorder     Thyroid disease     Transient arterial occlusion of retina     Transient visual loss     BOTH EYE  PER DR CAMPOVERDE  7/27/2015       TEST DATA:  All tests were administered according to standardized procedures and were selected based on the reason for referral.  Effort on all tests was satisfactory to produce valid results.    MMPI-3.  The MMPI-3 provides an  assessment of personality and psychopathology with specific evaluation of psychosocial risk factors associated with outcomes of spinal cord stimulation.  Mr. Moreno produced a valid and interpretable MMPI-3 profile, answering items relevantly based on content. Therefore, his responses should be considered a relatively accurate reflection of his current psychological functioning.    Scores on all subscales are within normal range. He shows no evidence of emotional, somatic, cognitive, behavioral, thought, or interpersonal function.    PAIRS and PCS.  The PAIRS and PCS reveal beliefs and attitudes related to pain that may impact outcomes of spinal cord stimulation.  Elevated scores indicate the need to query the ability to cope with the pain experience, high levels of emotional distress when pain occurs, and a negative mental set and persistent attention brought to bear during the experience of pain.    The PAIRS indicated clinically significant complications related to perceptions of impairment with a total score of 76 (a score over 75 is clinically significant).  These results suggest a tendency toward negative beliefs and attitudes about the ability to function and enjoy life despite discomfort from pain, which is more likely to contribute to greater functional impairments.     The PCS indicated minimal catastrophizing of pain with a total score of 13, which is in the 34th percentile (a score over 30 is in the 75th percentile and is clinically significant).  His subscale scores indicated minimal Rumination (31st percentile), minimal Magnification (50th percentile), and minimal Helplessness (36th percentile). These results suggest a tendency toward reasonable pain perception, reasonable perception of the seriousness of pain, and adaptive thinking about one's ability to cope with the pain experience.    FEEDBACK.  Mr. Moreno was provided with test results, and offered the opportunity to respond to feedback and  clarify results if needed.    DIAGNOSTIC IMPRESSIONS:  Mr. Moreno does not meet criteria for any DSM-5 psychiatric disorder.    SUMMARY AND RECOMMENDATIONS:  Mr. Moreno has a long history of severe pain and is pursuing the spinal cord stimulator (SCS) in an effort to improve pain and quality of life.  Test results revealed no psychosocial or mental health dysfunction.  Based on research and recommendations regarding presurgical psychological screening for pain control procedures, his test results and reports are within the expected range to predict adequate outcomes and patient satisfaction.      Mr. Moreno's testing profile was largely consistent with his reports in the clinical interview.  In the clinical interview, Mr. Moreno denied past psychiatric history and treatment.  Currently he reports no psychiatric problems, major psychosocial stressors, or other problems that would contraindicate surgery or impact his ability to engage in treatment.  He has adequate and appropriate knowledge and expectations regarding surgery, and is motivated and willing to engage in behaviors to achieve successful outcomes with SCS.  He has multiple protective factors that should help him during surgery and recovery.  There are no recommendations for psychological intervention at this time.  This evaluation revealed NO contraindications to SCS from a psychological perspective.          Report and interpretation and coding were completed on 10/25/2023.
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
## 2023-10-27 ENCOUNTER — TELEPHONE (OUTPATIENT)
Dept: PAIN MEDICINE | Facility: CLINIC | Age: 68
End: 2023-10-27
Payer: MEDICARE

## 2023-10-27 NOTE — TELEPHONE ENCOUNTER
----- Message from Saurav Girard, PhD sent at 10/25/2023  3:17 PM CDT -----  Psych eval completed. Cleared for SCS.

## 2023-10-27 NOTE — TELEPHONE ENCOUNTER
Physician - Dr Amado    Type of Procedure/Injection - Spinal Cord Stimulator Trial     [employer]      Laterality - NA      Anxiolysis- MAC      Need to hold medication - Yes      Aspirin for 7 days +5    Semaglutide (Ozempic) 7 days      Clearance needed - Yes      Follow up - 5 day post op

## 2023-10-30 DIAGNOSIS — G89.4 CHRONIC PAIN SYNDROME: ICD-10-CM

## 2023-10-30 DIAGNOSIS — G89.4 CHRONIC PAIN DISORDER: Primary | ICD-10-CM

## 2023-10-30 DIAGNOSIS — M54.16 LUMBAR RADICULOPATHY: ICD-10-CM

## 2023-10-30 RX ORDER — SODIUM CHLORIDE, SODIUM LACTATE, POTASSIUM CHLORIDE, CALCIUM CHLORIDE 600; 310; 30; 20 MG/100ML; MG/100ML; MG/100ML; MG/100ML
INJECTION, SOLUTION INTRAVENOUS CONTINUOUS
Status: CANCELLED | OUTPATIENT
Start: 2023-10-30

## 2023-10-30 NOTE — TELEPHONE ENCOUNTER
Attempted to reach patient to schedule. No answer. Left voicemail to return call to office.    *Patient's case request has been placed.*

## 2023-10-31 ENCOUNTER — TELEPHONE (OUTPATIENT)
Dept: PAIN MEDICINE | Facility: CLINIC | Age: 68
End: 2023-10-31

## 2023-10-31 DIAGNOSIS — Z01.818 PRE-OPERATIVE CLEARANCE: Primary | ICD-10-CM

## 2023-10-31 NOTE — TELEPHONE ENCOUNTER
----- Message from Valarie Rodriguez sent at 10/31/2023 11:01 AM CDT -----  Contact: Patient  Type:  Patient Returning Call    Who Called:  Patient  Who Left Message for Patient:  Carla  Does the patient know what this is regarding?:  Appointment for stimulator    Would the patient rather a call back or a response via [facility]?   Call back  Best Call Back Number:  [phone]    Additional Information:  States he is returning a missed call - please call to advise - thank you

## 2023-10-31 NOTE — TELEPHONE ENCOUNTER
Spoke with patient, scheduled procedure and follow up appointment. Reviewed pre-op instructions. Patient verbalized understanding.    Patient to hold ASA and ozempic for 7 days prior to procedure.

## 2023-11-01 PROBLEM — Z98.84 HISTORY OF LAPAROSCOPIC ADJUSTABLE GASTRIC BANDING: Status: ACTIVE | Noted: 2023-11-01

## 2023-11-01 PROBLEM — Z96.651 HISTORY OF RIGHT KNEE JOINT REPLACEMENT: Status: ACTIVE | Noted: 2023-11-01

## 2023-11-12 ENCOUNTER — E-CONSULT (OUTPATIENT)
Dept: CARDIOLOGY | Facility: CLINIC | Age: 68
End: 2023-11-12
Payer: MEDICARE

## 2023-11-12 DIAGNOSIS — Z01.810 PRE-OPERATIVE CARDIOVASCULAR EXAMINATION: Primary | ICD-10-CM

## 2023-11-12 PROCEDURE — 99451 PR INTERPROF, PHONE/INTERNET/EHR, CONSULT, >= 5MINS: ICD-10-PCS | Mod: S$GLB,,, | Performed by: INTERNAL MEDICINE

## 2023-11-12 PROCEDURE — 99451 NTRPROF PH1/NTRNET/EHR 5/>: CPT | Mod: S$GLB,,, | Performed by: INTERNAL MEDICINE

## 2023-11-12 NOTE — CONSULTS
[facility] - Cardiology  Response for E-Consult     Patient Name: Arthur Moreno  MRN: [id number]  Primary Care Provider: NEVAEH Delaney Jr., MD   Requesting Provider: Kedar Amado MD  E-Consult to Cardiology  Consult performed by: Cameron Carlson MD  Consult ordered by: Kedar Amado MD          Chart reviewed personally.    As long as no significant chest pain or shortness of breath with exertion, patient is at acceptable risk to proceed from a Cardiology standpoint.    Okay to hold aspirin 5-7 days prior to procedure, restart as soon as safe postprocedure.      Total time of Consultation: 10 minute    I did not speak to the requesting provider verbally about this.     *This eConsult is based on the clinical data available to me and is furnished without benefit of a physical examination. The eConsult will need to be interpreted in light of any clinical issues or changes in patient status not available to me at the time of filing this eConsults. Significant changes in patient condition or level of acuity should result in immediate formal consultation and reevaluation. Please alert me if you have further questions.    Thank you for this eConsult referral.     Cameron Carlson MD  [facility]

## 2023-11-15 ENCOUNTER — TELEPHONE (OUTPATIENT)
Dept: PAIN MEDICINE | Facility: CLINIC | Age: 68
End: 2023-11-15
Payer: MEDICARE

## 2023-12-01 ENCOUNTER — ANESTHESIA EVENT (OUTPATIENT)
Dept: SURGERY | Facility: HOSPITAL | Age: 68
End: 2023-12-01
Payer: MEDICARE

## 2023-12-04 ENCOUNTER — HOSPITAL ENCOUNTER (OUTPATIENT)
Facility: HOSPITAL | Age: 68
Discharge: HOME | End: 2023-12-04
Attending: ANESTHESIOLOGY | Admitting: ANESTHESIOLOGY
Payer: MEDICARE

## 2023-12-04 ENCOUNTER — HOSPITAL ENCOUNTER (OUTPATIENT)
Dept: RADIOLOGY | Facility: HOSPITAL | Age: 68
Discharge: HOME | End: 2023-12-04
Attending: ANESTHESIOLOGY | Admitting: ANESTHESIOLOGY
Payer: MEDICARE

## 2023-12-04 ENCOUNTER — ANESTHESIA (OUTPATIENT)
Dept: SURGERY | Facility: HOSPITAL | Age: 68
End: 2023-12-04
Payer: MEDICARE

## 2023-12-04 VITALS
DIASTOLIC BLOOD PRESSURE: 66 MMHG | HEIGHT: 71 IN | BODY MASS INDEX: 44.1 KG/M2 | WEIGHT: 315 LBS | SYSTOLIC BLOOD PRESSURE: 142 MMHG | HEART RATE: 75 BPM | RESPIRATION RATE: 18 BRPM | OXYGEN SATURATION: 98 % | TEMPERATURE: 98 F

## 2023-12-04 DIAGNOSIS — G89.4 CHRONIC PAIN SYNDROME: ICD-10-CM

## 2023-12-04 DIAGNOSIS — G89.4 CHRONIC PAIN DISORDER: ICD-10-CM

## 2023-12-04 DIAGNOSIS — M54.50 LOWER BACK PAIN: ICD-10-CM

## 2023-12-04 PROCEDURE — 25000003 PHARM REV CODE 250: Mod: PO | Performed by: ANESTHESIOLOGY

## 2023-12-04 PROCEDURE — 63600175 PHARM REV CODE 636 W HCPCS: Mod: PO | Performed by: NURSE ANESTHETIST, CERTIFIED REGISTERED

## 2023-12-04 PROCEDURE — 25000003 PHARM REV CODE 250: Mod: PO | Performed by: NURSE ANESTHETIST, CERTIFIED REGISTERED

## 2023-12-04 PROCEDURE — 71000033 HC RECOVERY, INTIAL HOUR: Mod: PO | Performed by: ANESTHESIOLOGY

## 2023-12-04 PROCEDURE — D9220A PRA ANESTHESIA: ICD-10-PCS | Mod: ANES,,, | Performed by: ANESTHESIOLOGY

## 2023-12-04 PROCEDURE — D9220A PRA ANESTHESIA: ICD-10-PCS | Mod: CRNA,,, | Performed by: NURSE ANESTHETIST, CERTIFIED REGISTERED

## 2023-12-04 PROCEDURE — 71000015 HC POSTOP RECOV 1ST HR: Mod: PO | Performed by: ANESTHESIOLOGY

## 2023-12-04 PROCEDURE — 37000008 HC ANESTHESIA 1ST 15 MINUTES: Mod: PO | Performed by: ANESTHESIOLOGY

## 2023-12-04 PROCEDURE — 63650 PR PERCUT IMPLNT NEUROELECT,EPIDURAL: ICD-10-PCS | Mod: ICN,,, | Performed by: ANESTHESIOLOGY

## 2023-12-04 PROCEDURE — D9220A PRA ANESTHESIA: Mod: CRNA,,, | Performed by: NURSE ANESTHETIST, CERTIFIED REGISTERED

## 2023-12-04 PROCEDURE — 63600175 PHARM REV CODE 636 W HCPCS: Mod: PO | Performed by: ANESTHESIOLOGY

## 2023-12-04 PROCEDURE — 37000009 HC ANESTHESIA EA ADD 15 MINS: Mod: PO | Performed by: ANESTHESIOLOGY

## 2023-12-04 PROCEDURE — 76000 FLUOROSCOPY <1 HR PHYS/QHP: CPT | Mod: 59,TC,PO

## 2023-12-04 PROCEDURE — C1778 LEAD, NEUROSTIMULATOR: HCPCS | Mod: PO | Performed by: ANESTHESIOLOGY

## 2023-12-04 PROCEDURE — 36000707: Mod: PO | Performed by: ANESTHESIOLOGY

## 2023-12-04 PROCEDURE — D9220A PRA ANESTHESIA: Mod: ANES,,, | Performed by: ANESTHESIOLOGY

## 2023-12-04 PROCEDURE — 63650 IMPLANT NEUROELECTRODES: CPT | Mod: ICN,,, | Performed by: ANESTHESIOLOGY

## 2023-12-04 PROCEDURE — 36000706: Mod: PO | Performed by: ANESTHESIOLOGY

## 2023-12-04 DEVICE — LEAD NEROSTIMLTR INFINION 50CM: Type: IMPLANTABLE DEVICE | Site: BACK | Status: FUNCTIONAL

## 2023-12-04 RX ORDER — PROPOFOL 10 MG/ML
VIAL (ML) INTRAVENOUS CONTINUOUS PRN
Status: DISCONTINUED | OUTPATIENT
Start: 2023-12-04 | End: 2023-12-04

## 2023-12-04 RX ORDER — LIDOCAINE HYDROCHLORIDE 10 MG/ML
INJECTION, SOLUTION EPIDURAL; INFILTRATION; INTRACAUDAL; PERINEURAL
Status: DISCONTINUED | OUTPATIENT
Start: 2023-12-04 | End: 2023-12-04 | Stop reason: HOSPADM

## 2023-12-04 RX ORDER — FENTANYL CITRATE 50 UG/ML
INJECTION, SOLUTION INTRAMUSCULAR; INTRAVENOUS
Status: DISCONTINUED | OUTPATIENT
Start: 2023-12-04 | End: 2023-12-04

## 2023-12-04 RX ORDER — OXYCODONE HYDROCHLORIDE 5 MG/1
5 TABLET ORAL
Status: DISCONTINUED | OUTPATIENT
Start: 2023-12-04 | End: 2023-12-04 | Stop reason: HOSPADM

## 2023-12-04 RX ORDER — SODIUM CHLORIDE, SODIUM LACTATE, POTASSIUM CHLORIDE, CALCIUM CHLORIDE 600; 310; 30; 20 MG/100ML; MG/100ML; MG/100ML; MG/100ML
INJECTION, SOLUTION INTRAVENOUS CONTINUOUS
Status: DISCONTINUED | OUTPATIENT
Start: 2023-12-04 | End: 2023-12-04 | Stop reason: HOSPADM

## 2023-12-04 RX ORDER — MEPERIDINE HYDROCHLORIDE 50 MG/ML
12.5 INJECTION INTRAMUSCULAR; INTRAVENOUS; SUBCUTANEOUS ONCE AS NEEDED
Status: DISCONTINUED | OUTPATIENT
Start: 2023-12-04 | End: 2023-12-04 | Stop reason: HOSPADM

## 2023-12-04 RX ORDER — LIDOCAINE HYDROCHLORIDE 10 MG/ML
1 INJECTION, SOLUTION EPIDURAL; INFILTRATION; INTRACAUDAL; PERINEURAL ONCE
Status: DISCONTINUED | OUTPATIENT
Start: 2023-12-04 | End: 2023-12-04 | Stop reason: HOSPADM

## 2023-12-04 RX ORDER — DIPHENHYDRAMINE HYDROCHLORIDE 50 MG/ML
12.5 INJECTION INTRAMUSCULAR; INTRAVENOUS EVERY 6 HOURS PRN
Status: DISCONTINUED | OUTPATIENT
Start: 2023-12-04 | End: 2023-12-04 | Stop reason: HOSPADM

## 2023-12-04 RX ORDER — PROPOFOL 10 MG/ML
VIAL (ML) INTRAVENOUS
Status: DISCONTINUED | OUTPATIENT
Start: 2023-12-04 | End: 2023-12-04

## 2023-12-04 RX ORDER — KETAMINE HCL IN 0.9 % NACL 50 MG/5 ML
SYRINGE (ML) INTRAVENOUS
Status: DISCONTINUED | OUTPATIENT
Start: 2023-12-04 | End: 2023-12-04

## 2023-12-04 RX ORDER — LIDOCAINE HYDROCHLORIDE 20 MG/ML
INJECTION INTRAVENOUS
Status: DISCONTINUED | OUTPATIENT
Start: 2023-12-04 | End: 2023-12-04

## 2023-12-04 RX ORDER — HYDROMORPHONE HYDROCHLORIDE 2 MG/ML
0.2 INJECTION, SOLUTION INTRAMUSCULAR; INTRAVENOUS; SUBCUTANEOUS EVERY 5 MIN PRN
Status: DISCONTINUED | OUTPATIENT
Start: 2023-12-04 | End: 2023-12-04 | Stop reason: HOSPADM

## 2023-12-04 RX ORDER — ONDANSETRON 2 MG/ML
INJECTION INTRAMUSCULAR; INTRAVENOUS
Status: DISCONTINUED | OUTPATIENT
Start: 2023-12-04 | End: 2023-12-04

## 2023-12-04 RX ORDER — FENTANYL CITRATE 50 UG/ML
25 INJECTION, SOLUTION INTRAMUSCULAR; INTRAVENOUS EVERY 5 MIN PRN
Status: DISCONTINUED | OUTPATIENT
Start: 2023-12-04 | End: 2023-12-04 | Stop reason: HOSPADM

## 2023-12-04 RX ORDER — ONDANSETRON 2 MG/ML
4 INJECTION INTRAMUSCULAR; INTRAVENOUS DAILY PRN
Status: DISCONTINUED | OUTPATIENT
Start: 2023-12-04 | End: 2023-12-04 | Stop reason: HOSPADM

## 2023-12-04 RX ADMIN — CEFAZOLIN SODIUM 3 ML: 2 SOLUTION INTRAVENOUS at 01:12

## 2023-12-04 RX ADMIN — PROPOFOL 50 MG: 10 INJECTION, EMULSION INTRAVENOUS at 01:12

## 2023-12-04 RX ADMIN — GLYCOPYRROLATE 0.2 MG: 0.2 INJECTION, SOLUTION INTRAMUSCULAR; INTRAVENOUS at 01:12

## 2023-12-04 RX ADMIN — FENTANYL CITRATE 50 MCG: 50 INJECTION, SOLUTION INTRAMUSCULAR; INTRAVENOUS at 01:12

## 2023-12-04 RX ADMIN — Medication 25 MG: at 01:12

## 2023-12-04 RX ADMIN — OXYCODONE HYDROCHLORIDE 5 MG: 5 TABLET ORAL at 01:12

## 2023-12-04 RX ADMIN — ONDANSETRON 4 MG: 2 INJECTION, SOLUTION INTRAMUSCULAR; INTRAVENOUS at 01:12

## 2023-12-04 RX ADMIN — LIDOCAINE HYDROCHLORIDE 50 MG: 20 INJECTION INTRAVENOUS at 01:12

## 2023-12-04 RX ADMIN — PROPOFOL 150 MCG/KG/MIN: 10 INJECTION, EMULSION INTRAVENOUS at 01:12

## 2023-12-04 RX ADMIN — SODIUM CHLORIDE, POTASSIUM CHLORIDE, SODIUM LACTATE AND CALCIUM CHLORIDE: 600; 310; 30; 20 INJECTION, SOLUTION INTRAVENOUS at 12:12

## 2023-12-04 NOTE — ANESTHESIA PREPROCEDURE EVALUATION
12/04/2023  Arthur Moreno is a 68 y.o., male.      Pre-op Assessment    I have reviewed the Patient Summary Reports.     I have reviewed the Nursing Notes. I have reviewed the NPO Status.   I have reviewed the Medications.     Review of Systems  Anesthesia Hx:  No problems with previous Anesthesia                Social:  Non-Smoker       Cardiovascular:     Hypertension, well controlled   CAD    Dysrhythmias       hyperlipidemia    Venous insufficiency BLE     Coronary Artery Disease:                            Hypertension         Pulmonary:        Sleep Apnea     Obstructive Sleep Apnea (LIBERTAD).           Renal/:  Renal/ Normal                 Hepatic/GI:      Liver Disease,         Liver Disease        Musculoskeletal:  Arthritis        Arthritis     Spine Disorders: cervical and lumbar            Neurological:  TIA,  Neuromuscular Disease,           Arthritis               TIA - Transient Ischemic Attack             Neuromuscular Disease   Endocrine:   Hypothyroidism       Hypothyroidism        Obesity / BMI > 30, Morbid Obesity / BMI > 40      Physical Exam  General: Well nourished, Cooperative, Alert and Oriented    Airway:  Mallampati: II   Mouth Opening: Normal  TM Distance: Normal  Neck ROM: Normal ROM    Anesthesia Plan  Type of Anesthesia, risks & benefits discussed:    Anesthesia Type: Gen ETT, Gen Supraglottic Airway, Gen Natural Airway, MAC  Intra-op Monitoring Plan: Standard ASA Monitors  Post Op Pain Control Plan: multimodal analgesia  Induction:  IV  Airway Plan: Direct, Video and Fiberoptic, Post-Induction  Informed Consent: Informed consent signed with the Patient and all parties understand the risks and agree with anesthesia plan.  All questions answered.   ASA Score: 3    Ready For Surgery From Anesthesia Perspective.   .

## 2023-12-04 NOTE — DISCHARGE INSTRUCTIONS
SPINAL CORD STIMULATOR    Sponge bath only until follow up with the doctor.  No bending, lifting or twisting.   Do not make any movements that cause bandages to pull.  Do not lift your elbows higher than your shoulders.  Do not remove dressings.  No strenuous activities.  Follow up with your physician in one week.  Call your doctor for excessive bleeding or swelling.  Rep will call you tomorrow. Phone number is on the box.    Call [phone] for doctor.

## 2023-12-04 NOTE — TRANSFER OF CARE
"Anesthesia Transfer of Care Note    Patient: Arthur Moreno    Procedure(s) Performed: Procedure(s) (LRB):  INSERTION, NEUROSTIMULATOR, SPINAL CORD, DORSAL COLUMN, FOR TRIAL (N/A)    Patient location: PACU    Anesthesia Type: general    Transport from OR: Transported from OR on room air with adequate spontaneous ventilation    Post pain: adequate analgesia    Post assessment: no apparent anesthetic complications and tolerated procedure well    Post vital signs: stable    Level of consciousness: awake, alert and oriented    Nausea/Vomiting: no nausea/vomiting    Complications: none    Transfer of care protocol was followed      Last vitals: Visit Vitals  BP (!) 143/64 (BP Location: Right arm, Patient Position: Lying)   Pulse 73   Temp 36.4 °C (97.6 °F)   Resp 17   Ht 5' 11" (1.803 m)   Wt (!) 142.9 kg (315 lb)   SpO2 97%   BMI 43.93 kg/m²     "

## 2023-12-04 NOTE — DISCHARGE SUMMARY
[facility] - Surgery  Discharge Note  Short Stay    Procedure(s) (LRB):  INSERTION, NEUROSTIMULATOR, SPINAL CORD, DORSAL COLUMN, FOR TRIAL (N/A)      OUTCOME: Patient tolerated treatment/procedure well without complication and is now ready for discharge.    DISPOSITION: Home or Self Care    FINAL DIAGNOSIS:  Chronic pain syndrome    FOLLOWUP: In clinic    DISCHARGE INSTRUCTIONS:    Discharge Procedure Orders   Diet Adult Regular     No dressing needed     Notify your health care provider if you experience any of the following:  temperature >100.4     Activity as tolerated

## 2023-12-04 NOTE — H&P
CC: Back pain    HPI: The patient is a 67yo man with a history of chronic pain syndrome, lumbar radiculitis here for [employer]. There are no major changes in history and physical from 9/19/23.    Past Medical History:   Diagnosis Date    Amaurosis fugax of right eye 04/2014    resolved    Anticoagulant long-term use     Arm mass, left 06/20/2019    Bilateral chronic knee pain 01/10/2017    Bilateral venous insufficiency     legs    Calculus of gallbladder without mention of cholecystitis or obstruction     Cardiac arrhythmia     has implanted loop recorder, palpitations. Hx dizziness on bending over    Cellulitis and abscess of leg 04/2014    treated at wound care center, resolving    Cervical spondylosis 01/17/2020    Coronary artery disease     denies chest pain, denies MI, no stents; sees Dr Aldo PARSONSID-19 10/3/22 10/03/2022    Effusion, left knee 05/23/2017    Estrogen excess 08/2017    Generalized osteoarthrosis, involving multiple sites     Hyperlipidemia     Hypertension     Hypertension 10/07/2015    Hypothyroidism     Knee pain     right far worse than left    Low back pain 08/10/2015    Lymphedema 08/01/2021    Malaise and fatigue 05/23/2017    Morbidly obese 10/12/2022    Obesity, unspecified     Occlusion and stenosis of carotid artery without mention of cerebral infarction     Primary osteoarthritis of right knee 08/2017    Sleep apnea with use of continuous positive airway pressure (CPAP)     Status post placement of implantable loop recorder     Thyroid disease     Transient arterial occlusion of retina     Transient visual loss     BOTH EYE  PER DR CAMPOVERDE  7/27/2015       Past Surgical History:   Procedure Laterality Date    BACK SURGERY N/A     decompression of L2 to L5     CARDIAC SURGERY  2013?    insertion loop recorder, and then removed    CARDIAC SURGERY  2014    transesophageal echo; no clot seen, per patient    CARPAL TUNNEL RELEASE      bilateral    CAUDAL  EPIDURAL STEROID INJECTION N/A 8/25/2023    Procedure: Injection-steroid-epidural-caudal;  Surgeon: Kedar Amado MD;  Location: [facility] OR;  Service: Pain Management;  Laterality: N/A;    CHOLECYSTECTOMY      COLONOSCOPY  09/26/2016    Dr. Ryley ANDRES Right     HIATAL HERNIA REPAIR  2011    INJECTION OF ANESTHETIC AGENT AROUND MEDIAL BRANCH NERVES INNERVATING CERVICAL FACET JOINT Right 01/17/2020    Procedure: Block-nerve-medial branch-cervical C5, C6;  Surgeon: Kedar Amado MD;  Location: [facility] OR;  Service: Pain Management;  Laterality: Right;    INJECTION OF ANESTHETIC AGENT AROUND MEDIAL BRANCH NERVES INNERVATING LUMBAR FACET JOINT Bilateral 09/11/2020    Procedure: BLOCK, NERVE, FACET JOINT, LUMBAR, MEDIAL BRANCH L4 and L5;  Surgeon: Kedar Amado MD;  Location: [facility] OR;  Service: Pain Management;  Laterality: Bilateral;    injection SI joint  7/201    JOINT REPLACEMENT      right knee   august 2017    KNEE ARTHROSCOPY Bilateral     right x 2; left x 1    LAPAROSCOPIC GASTRIC BANDING      LAPAROSCOPIC GASTRIC BANDING      LHC      LUMBAR EPIDURAL INJECTION      POLYPECTOMY      vocal cord    RADIOFREQUENCY ABLATION OF LUMBAR MEDIAL BRANCH NERVE AT SINGLE LEVEL Bilateral 09/23/2020    Procedure: Radiofrequency Ablation, Nerve, Spinal, Lumbar, Medial Branch L4 and L5;  Surgeon: Kedar Amado MD;  Location: [facility] OR;  Service: Pain Management;  Laterality: Bilateral;    RADIOFREQUENCY THERMAL COAGULATION OF MEDIAL BRANCH OF POSTERIOR RAMUS OF CERVICAL SPINAL NERVE Right 02/06/2020    Procedure: RADIOFREQUENCY THERMAL COAGULATION, NERVE, SPINAL, CERVICAL, POSTERIOR RAMUS, MEDIAL BRANCH C5, C6;  Surgeon: Kedar Amado MD;  Location: [facility] OR;  Service: Pain Management;  Laterality: Right;    RHIZOTOMY W/ RADIOFREQUENCY ABLATION  2016    lumbar    ROTATOR CUFF REPAIR      right    SURGICAL REMOVAL OF MASS OF UPPER EXTREMITY Left 06/20/2019    Procedure: EXCISION, MASS, UPPER EXTREMITY;   Surgeon: Max Mandel MD;  Location: [facility] OR;  Service: General;  Laterality: Left;    TRANSFORAMINAL EPIDURAL INJECTION OF STEROID Bilateral 7/11/2023    Procedure: Injection,steroid,epidural,transforaminal approach L5/S1;  Surgeon: Kedar Amado MD;  Location: [facility];  Service: Pain Management;  Laterality: Bilateral;    vocal cord surgery  1975       Family History   Problem Relation Age of Onset    Diabetes Mother     Heart disease Mother     Alzheimer's disease Mother     Diabetes Father     Heart disease Father     Arthritis Sister     Arthritis Brother     Arthritis Brother     Arthritis Brother        Social History     Socioeconomic History    Marital status:      Spouse name: Nuria    Number of children: 0   Tobacco Use    Smoking status: Never     Passive exposure: Current (one day weekly)    Smokeless tobacco: Never   Substance and Sexual Activity    Alcohol use: Yes     Comment: increased - 3 large drinks nightly    Drug use: Never    Sexual activity: Yes     Partners: Female     Birth control/protection: None     Social Determinants of Health     Financial Resource Strain: Low Risk  (10/25/2023)    Overall Financial Resource Strain (CARDIA)     Difficulty of Paying Living Expenses: Not hard at all   Food Insecurity: No Food Insecurity (10/25/2023)    Hunger Vital Sign     Worried About Running Out of Food in the Last Year: Never true     Ran Out of Food in the Last Year: Never true   Transportation Needs: No Transportation Needs (10/25/2023)    PRAPARE - Transportation     Lack of Transportation (Medical): No     Lack of Transportation (Non-Medical): No   Physical Activity: Sufficiently Active (10/25/2023)    Exercise Vital Sign     Days of Exercise per Week: 3 days     Minutes of Exercise per Session: 50 min   Stress: No Stress Concern Present (10/25/2023)    [employer] - Occupational Stress Questionnaire     Feeling of Stress : Only a little   Social  "Connections: Unknown (10/25/2023)    Social Connection and Isolation Panel [NHANES]     Frequency of Communication with Friends and Family: Three times a week     Frequency of Social Gatherings with Friends and Family: Twice a week     Active Member of Clubs or Organizations: Yes     Attends Club or Organization Meetings: More than 4 times per year     Marital Status:    Housing Stability: Low Risk  (10/25/2023)    Housing Stability Vital Sign     Unable to Pay for Housing in the Last Year: No     Number of Places Lived in the Last Year: 1     Unstable Housing in the Last Year: No       Current Facility-Administered Medications   Medication Dose Route Frequency Provider Last Rate Last Admin    ceFAZolin (ANCEF) 3 gram in dextrose 5% IVPB  3 g Intravenous On Call Procedure Kedar Amado MD        electrolyte-S (ISOLYTE)   Intravenous Continuous Eladio Rodriguez MD        lactated ringers infusion   Intravenous Continuous Eladio Rodriguez MD        lactated ringers infusion   Intravenous Continuous Kedar Amado MD 25 mL/hr at 12/04/23 1215 New Bag at 12/04/23 1215    LIDOcaine (PF) 10 mg/ml (1%) injection 10 mg  1 mL Intradermal Once Eladio Rodriguez MD         Facility-Administered Medications Ordered in Other Encounters   Medication Dose Route Frequency Provider Last Rate Last Admin    lactated ringers infusion   Intravenous Continuous Kedar Amado MD   Stopped at 07/11/23 1500    LIDOcaine (PF) 10 mg/ml (1%) injection 10 mg  1 mL Intradermal Once Kedar Amado MD           Review of patient's allergies indicates:   Allergen Reactions    Lamisil [terbinafine] Rash     Emollient form only , also had Bactrim in it, not sure       Vitals:    11/29/23 0954 12/04/23 1214   BP:  (!) 143/64   Pulse:  73   Resp:  17   Temp:  97.6 °F (36.4 °C)   SpO2:  97%   Weight: (!) 142.9 kg (315 lb)    Height: 5' 11" (1.803 m)        ASA 2, Mallampati 2    REVIEW OF SYSTEMS:     GENERAL: No weight " loss, malaise or fevers.  HEENT:  No recent changes in vision or hearing  NECK: Negative for lumps, no difficulty with swallowing.  RESPIRATORY: Negative for cough, wheezing or shortness of breath, patient denies any recent URI.  CARDIOVASCULAR: Negative for chest pain, leg swelling or palpitations.  GI: Negative for abdominal discomfort, blood in stools or black stools or change in bowel habits.  MUSCULOSKELETAL: See HPI.  SKIN: Negative for lesions, rash, and itching.  PSYCH: No suicidal or homicidal ideations, no current mood disturbances.  HEMATOLOGY/LYMPHOLOGY: Negative for prolonged bleeding, bruising easily or swollen nodes. Patient is not currently taking any anti-coagulants  ENDO: No history of diabetes or thyroid dysfunction  NEURO: No history of syncope, paralysis, seizures or tremors.All other reviewed and negative other than HPI.    Physical exam:  Gen: A and O x3, pleasant, well-groomed  Skin: No rashes or obvious lesions  HEENT: PERRLA, no obvious deformities on ears or in canals. No thyroid masses, trachea midline, no palpable lymph nodes in neck, axilla.  CVS: Regular rate and rhythm, normal S1 and S2, no murmurs.  Resp: Clear to auscultation bilaterally.  Abdomen: Soft, NT/ND, normal bowel sounds present.  Musculoskeletal/Neuro: Moving all extremities    Assessment:  Chronic pain disorder  -     Case Request Operating Room: INSERTION, NEUROSTIMULATOR, SPINAL CORD, DORSAL COLUMN, FOR TRIAL  -     Place in Outpatient; Standing  -     Vital signs; Standing  -     Place 18-22 gauage peripheral IV ; Standing  -     Verify informed consent; Standing  -     Notify physician ; Standing  -     Notify physician ; Standing  -     Notify physician (specify); Standing  -     Diet NPO; Standing  -     lactated ringers infusion  -     ceFAZolin (ANCEF) 3 gram in dextrose 5% IVPB    Chronic pain syndrome    Other orders  -     Vital signs; Standing  -     Insert peripheral IV; Standing  -     Use 1% lidocaine at  IV site; Standing  -     Notify Physician - Potential Need of Opioid Reversal; Standing  -     electrolyte-S (ISOLYTE)  -     lactated ringers infusion  -     Notify Anesthesiologist if Patient on Home Insulin Pump; Standing  -     LIDOcaine (PF) 10 mg/ml (1%) injection 10 mg  -     Pulse Oximetry Q4H; Standing  -     Admit to Phase I recovery, transfer to phase II level of care when Moni score is 9 out of 10; Standing  -     Vital signs; Standing  -     fentaNYL 50 mcg/mL injection 25 mcg  -     oxyCODONE immediate release tablet 5 mg  -     HYDROmorphone (PF) injection 0.2 mg  -     meperidine injection 12.5 mg  -     ondansetron injection 4 mg  -     diphenhydrAMINE injection 12.5 mg  -     Intake and output Per protocol; Standing  -     Apply warming blanket; Standing  -     Discharge home from Phase II when PADSS scoring system score is 9 to 10 on PADSS Scoring system met; Standing  -     POCT glucose; Standing  -     Notify Anesthesiologist; Standing  -     Notify anesthesiologist after 2 hours if Phase II level of care criteria not met; Standing  -     Oxygen Continuous; Standing  -     Pulse Oximetry Continuous; Standing  -     IP VTE LOW RISK PATIENT; Standing

## 2023-12-04 NOTE — OP NOTE
PROCEDURE DATE: 12/4/2023    Procedure  1. Percutaneous insertion of spinal cord stimulator leads x 2. Total of 32 contacts.   2. Fluoroscopic needle guidance.   3. Intraoperative complex programming of Spinal Cord Stimulator, >30mins.     Pre-op Diagnosis: : Chronic pain syndrome, lumbar radiculitis    Post-op Diagnosis: Same    Surgeon: Kedar Amado M.D.    Assistant: none    Anesthesia: MAC per Anesthesia Provider    Blood Loss: <5ml    Complications: none    PROCEDURE NOTE: After obtaining written informed consent patient was taken to the procedure room. Pre-procedure blood pressure and pulse were stable and recorded in patients clinic chart. Pre-op IV antibiotics were started by the Anesthesia provider.    The patient was taken to the operating room and placed in prone position. Routine monitors were applied and IV anesthesia was titrated to effect by the Anesthesia provider. The patient's back and buttocks were prepped and draped in sterile fashion using betadine and then DuraPrep solution and sterile drapes were applied. C-arm fluoroscopy was used to identify the T12/L1 interspace. Through a 1% local lidocaine skin wheal, a small stab incision was made with a #11 blade scapel. Through this, a 14-gauge Tuohy needle was advanced until contact was made with the right-sided L1 lamina. It was then walked off in a cephalad medial direction using loss of resistance to technique entering into the epidural space. Once within the epidural space, an epidural spinal cord stimulator lead was advanced until the tip of the lead rested at the middle of the  T6 vertebral body. Following this, another percutaneous lead was placed with another Tuohy needle on the left side following the same procedure as described for the other side. Once all the leads were in place, stimulation testing was carried out by the device representative under my guidance. Once the leads were noted to be within proper position with patient  reporting stimulation in the painful areas, the needle was removed. The leads were secured to the patient's back using 0-0 silk. Skin was cleaned, bacitracin applied and a sterile dressing was applied.    Following the procedure the patient's vital signs were stable. The patient was taken to the recovery room in good condition with no known complication. The patient was allowed to fully awaken from anesthesia and final programming of the device was done by the device representative under my guidance. The patient was discharged home in good condition after being given discharge instructions.

## 2023-12-05 NOTE — ANESTHESIA POSTPROCEDURE EVALUATION
Anesthesia Post Evaluation    Patient: Arthur Moreno    Procedure(s) Performed: Procedure(s) (LRB):  INSERTION, NEUROSTIMULATOR, SPINAL CORD, DORSAL COLUMN, FOR TRIAL (N/A)    Final Anesthesia Type: general      Patient location during evaluation: PACU  Patient participation: Yes- Able to Participate  Level of consciousness: awake and alert and oriented  Post-procedure vital signs: reviewed and stable  Pain management: adequate  Airway patency: patent    PONV status at discharge: No PONV  Anesthetic complications: no      Cardiovascular status: blood pressure returned to baseline and stable  Respiratory status: unassisted and spontaneous ventilation  Hydration status: euvolemic  Follow-up not needed.              Vitals Value Taken Time   /66 12/04/23 1420   Temp 36.4 °C (97.5 °F) 12/04/23 1352   Pulse 75 12/04/23 1420   Resp 18 12/04/23 1420   SpO2 98 % 12/04/23 1420         Event Time   Out of Recovery 14:00:00         Pain/Moni Score: Pain Rating Prior to Med Admin: 5 (12/4/2023  1:59 PM)  Pain Rating Post Med Admin: 3 (12/4/2023  2:20 PM)  Moni Score: 10 (12/4/2023  2:20 PM)

## 2023-12-08 ENCOUNTER — OFFICE VISIT (OUTPATIENT)
Dept: PAIN MEDICINE | Facility: CLINIC | Age: 68
End: 2023-12-08
Payer: MEDICARE

## 2023-12-08 ENCOUNTER — TELEPHONE (OUTPATIENT)
Dept: PAIN MEDICINE | Facility: CLINIC | Age: 68
End: 2023-12-08

## 2023-12-08 VITALS
BODY MASS INDEX: 44.1 KG/M2 | SYSTOLIC BLOOD PRESSURE: 124 MMHG | HEIGHT: 71 IN | WEIGHT: 315 LBS | DIASTOLIC BLOOD PRESSURE: 62 MMHG | HEART RATE: 75 BPM

## 2023-12-08 DIAGNOSIS — M54.16 LUMBAR RADICULITIS: ICD-10-CM

## 2023-12-08 DIAGNOSIS — G89.4 CHRONIC PAIN DISORDER: Primary | ICD-10-CM

## 2023-12-08 PROCEDURE — 99024 POSTOP FOLLOW-UP VISIT: CPT | Mod: S$GLB,,, | Performed by: PHYSICIAN ASSISTANT

## 2023-12-08 PROCEDURE — 4010F PR ACE/ARB THEARPY RXD/TAKEN: ICD-10-PCS | Mod: CPTII,S$GLB,, | Performed by: PHYSICIAN ASSISTANT

## 2023-12-08 PROCEDURE — 3288F FALL RISK ASSESSMENT DOCD: CPT | Mod: CPTII,S$GLB,, | Performed by: PHYSICIAN ASSISTANT

## 2023-12-08 PROCEDURE — 1125F PR PAIN SEVERITY QUANTIFIED, PAIN PRESENT: ICD-10-PCS | Mod: CPTII,S$GLB,, | Performed by: PHYSICIAN ASSISTANT

## 2023-12-08 PROCEDURE — 3288F PR FALLS RISK ASSESSMENT DOCUMENTED: ICD-10-PCS | Mod: CPTII,S$GLB,, | Performed by: PHYSICIAN ASSISTANT

## 2023-12-08 PROCEDURE — 3074F SYST BP LT 130 MM HG: CPT | Mod: CPTII,S$GLB,, | Performed by: PHYSICIAN ASSISTANT

## 2023-12-08 PROCEDURE — 1159F MED LIST DOCD IN RCRD: CPT | Mod: CPTII,S$GLB,, | Performed by: PHYSICIAN ASSISTANT

## 2023-12-08 PROCEDURE — 3044F HG A1C LEVEL LT 7.0%: CPT | Mod: CPTII,S$GLB,, | Performed by: PHYSICIAN ASSISTANT

## 2023-12-08 PROCEDURE — 1160F PR REVIEW ALL MEDS BY PRESCRIBER/CLIN PHARMACIST DOCUMENTED: ICD-10-PCS | Mod: CPTII,S$GLB,, | Performed by: PHYSICIAN ASSISTANT

## 2023-12-08 PROCEDURE — 3078F PR MOST RECENT DIASTOLIC BLOOD PRESSURE < 80 MM HG: ICD-10-PCS | Mod: CPTII,S$GLB,, | Performed by: PHYSICIAN ASSISTANT

## 2023-12-08 PROCEDURE — 1159F PR MEDICATION LIST DOCUMENTED IN MEDICAL RECORD: ICD-10-PCS | Mod: CPTII,S$GLB,, | Performed by: PHYSICIAN ASSISTANT

## 2023-12-08 PROCEDURE — 3078F DIAST BP <80 MM HG: CPT | Mod: CPTII,S$GLB,, | Performed by: PHYSICIAN ASSISTANT

## 2023-12-08 PROCEDURE — 1160F RVW MEDS BY RX/DR IN RCRD: CPT | Mod: CPTII,S$GLB,, | Performed by: PHYSICIAN ASSISTANT

## 2023-12-08 PROCEDURE — 1125F AMNT PAIN NOTED PAIN PRSNT: CPT | Mod: CPTII,S$GLB,, | Performed by: PHYSICIAN ASSISTANT

## 2023-12-08 PROCEDURE — 1101F PT FALLS ASSESS-DOCD LE1/YR: CPT | Mod: CPTII,S$GLB,, | Performed by: PHYSICIAN ASSISTANT

## 2023-12-08 PROCEDURE — 99999 PR PBB SHADOW E&M-EST. PATIENT-LVL IV: CPT | Mod: PBBFAC,,, | Performed by: PHYSICIAN ASSISTANT

## 2023-12-08 PROCEDURE — 99024 PR POST-OP FOLLOW-UP VISIT: ICD-10-PCS | Mod: S$GLB,,, | Performed by: PHYSICIAN ASSISTANT

## 2023-12-08 PROCEDURE — 1101F PR PT FALLS ASSESS DOC 0-1 FALLS W/OUT INJ PAST YR: ICD-10-PCS | Mod: CPTII,S$GLB,, | Performed by: PHYSICIAN ASSISTANT

## 2023-12-08 PROCEDURE — 4010F ACE/ARB THERAPY RXD/TAKEN: CPT | Mod: CPTII,S$GLB,, | Performed by: PHYSICIAN ASSISTANT

## 2023-12-08 PROCEDURE — 3044F PR MOST RECENT HEMOGLOBIN A1C LEVEL <7.0%: ICD-10-PCS | Mod: CPTII,S$GLB,, | Performed by: PHYSICIAN ASSISTANT

## 2023-12-08 PROCEDURE — 99999 PR PBB SHADOW E&M-EST. PATIENT-LVL IV: ICD-10-PCS | Mod: PBBFAC,,, | Performed by: PHYSICIAN ASSISTANT

## 2023-12-08 PROCEDURE — 3074F PR MOST RECENT SYSTOLIC BLOOD PRESSURE < 130 MM HG: ICD-10-PCS | Mod: CPTII,S$GLB,, | Performed by: PHYSICIAN ASSISTANT

## 2023-12-08 RX ORDER — CHLORHEXIDINE GLUCONATE 40 MG/ML
SOLUTION TOPICAL
Qty: 120 ML | Refills: 0 | Status: SHIPPED | OUTPATIENT
Start: 2023-12-08

## 2023-12-08 NOTE — PROGRESS NOTES
This note was completed with dictation software and grammatical errors may exist.    CC:Neck pain, back pain    HPI:  The patient is a 68-year-old man with a history of hypertension, CAD, loop recorder, venous insufficiency who presents in referral from Dr. Crawford for neck pain.  He returns in follow-up today 5 days postop spinal cord stimulator trial with [employer].  For the 1st 2-3 days he had 100% relief of his pain, now reporting 70-80% relief of his pain.  This not only helped his back pain but also his leg pain.  He is pleased with the results and would like to move forward with a spinal cord stimulator implant.  He did experience some left anterior thigh pain after the procedure but this is improving.    Previous history:  The patient is a 64-year-old man with a history of CAD on aspirin, obesity, lumbar stenosis status post surgery, gout who presents in referral from Dr. Crawford for right-sided neck pain. Patient denies any specific trauma, reports having no longstanding history of neck pain.  However about 18 months ago he began having discomfort in the right side of his neck.  It is worse with turning his head side to side especially with right lateral rotation.  It stays in the right neck and radiates up to the occiput but denies any radiation into the parietal region, no headaches with involved with this.  He denies any major radiation into the arms, no numbness or tingling, no weakness in his arms.  He denies any balance issues or dexterity issues.  He denies any major symptoms on the left side.  He describes the pain as aching and dull and sharp, worse with flexion and extension of the neck and lateral rotation, does get some relief with lying down.    Pain intervention history:  He has been doing physical therapy for his neck, has not had much benefit with this.  He does have a history of lumbar stenosis, had undergone epidural steroid injections and radiofrequency ablation but eventually the  pain worsened and he underwent surgery by Dr. Johnson.  He is status post L2-L5 decompression in 2016. He is status post right C5 and C6 medial branch block on 01/17/2020 with greater than 80% relief and then radiofrequency ablation procedure on 02/06/2020 with what he describes as 50% relief of his neck pain.   He is status post bilateral L4 and L5 medial branch radiofrequency ablation on 09/23/2020 with 0% relief.  He is status post bilateral L5/S1 RIGO on 07/11/2023 with initially 70% relief lasting roughly 4 weeks and now 0% relief.    Spine surgeries:    Antineuropathics:  NSAIDs:  Physical therapy:  Antidepressants:  Muscle relaxers:  Opioids:  Antiplatelets/Anticoagulants:    ROS:  He reports easy bruising and back pain.  Balance of review of systems is negative.    Past Medical History:   Diagnosis Date    Amaurosis fugax of right eye 04/2014    resolved    Anticoagulant long-term use     Arm mass, left 06/20/2019    Bilateral chronic knee pain 01/10/2017    Bilateral venous insufficiency     legs    Calculus of gallbladder without mention of cholecystitis or obstruction     Cardiac arrhythmia     has implanted loop recorder, palpitations. Hx dizziness on bending over    Cellulitis and abscess of leg 04/2014    treated at wound care center, resolving    Cervical spondylosis 01/17/2020    Coronary artery disease     denies chest pain, denies MI, no stents; sees Dr Carlson    COVID-19 10/3/22 10/03/2022    Effusion, left knee 05/23/2017    Estrogen excess 08/2017    Generalized osteoarthrosis, involving multiple sites     Hyperlipidemia     Hypertension     Hypertension 10/07/2015    Hypothyroidism     Knee pain     right far worse than left    Low back pain 08/10/2015    Lymphedema 08/01/2021    Malaise and fatigue 05/23/2017    Morbidly obese 10/12/2022    Obesity, unspecified     Occlusion and stenosis of carotid artery without mention of cerebral infarction     Primary osteoarthritis of right knee 08/2017     Sleep apnea with use of continuous positive airway pressure (CPAP)     Status post placement of implantable loop recorder     Thyroid disease     Transient arterial occlusion of retina     Transient visual loss     BOTH EYE  PER DR CAMPOVERDE  7/27/2015       Past Surgical History:   Procedure Laterality Date    BACK SURGERY N/A     decompression of L2 to L5     CARDIAC SURGERY  2013?    insertion loop recorder, and then removed    CARDIAC SURGERY  2014    transesophageal echo; no clot seen, per patient    CARPAL TUNNEL RELEASE      bilateral    CAUDAL EPIDURAL STEROID INJECTION N/A 8/25/2023    Procedure: Injection-steroid-epidural-caudal;  Surgeon: Kedar Amado MD;  Location: [facility] OR;  Service: Pain Management;  Laterality: N/A;    CHOLECYSTECTOMY      COLONOSCOPY  09/26/2016    Dr. Rlyey ANDRES Right     HIATAL HERNIA REPAIR  2011    INJECTION OF ANESTHETIC AGENT AROUND MEDIAL BRANCH NERVES INNERVATING CERVICAL FACET JOINT Right 01/17/2020    Procedure: Block-nerve-medial branch-cervical C5, C6;  Surgeon: Kedar Amado MD;  Location: [facility] OR;  Service: Pain Management;  Laterality: Right;    INJECTION OF ANESTHETIC AGENT AROUND MEDIAL BRANCH NERVES INNERVATING LUMBAR FACET JOINT Bilateral 09/11/2020    Procedure: BLOCK, NERVE, FACET JOINT, LUMBAR, MEDIAL BRANCH L4 and L5;  Surgeon: Kedar Amado MD;  Location: [facility] OR;  Service: Pain Management;  Laterality: Bilateral;    injection SI joint  7/201    INSERTION OF DORSAL COLUMN NERVE STIMULATOR FOR TRIAL N/A 12/4/2023    Procedure: INSERTION, NEUROSTIMULATOR, SPINAL CORD, DORSAL COLUMN, FOR TRIAL;  Surgeon: Kedar Amado MD;  Location: [facility] OR;  Service: Pain Management;  Laterality: N/A;    JOINT REPLACEMENT      right knee   august 2017    KNEE ARTHROSCOPY Bilateral     right x 2; left x 1    LAPAROSCOPIC GASTRIC BANDING      LAPAROSCOPIC GASTRIC BANDING      LHC      LUMBAR EPIDURAL INJECTION      POLYPECTOMY      vocal cord     RADIOFREQUENCY ABLATION OF LUMBAR MEDIAL BRANCH NERVE AT SINGLE LEVEL Bilateral 09/23/2020    Procedure: Radiofrequency Ablation, Nerve, Spinal, Lumbar, Medial Branch L4 and L5;  Surgeon: Kedar Amado MD;  Location: [facility] OR;  Service: Pain Management;  Laterality: Bilateral;    RADIOFREQUENCY THERMAL COAGULATION OF MEDIAL BRANCH OF POSTERIOR RAMUS OF CERVICAL SPINAL NERVE Right 02/06/2020    Procedure: RADIOFREQUENCY THERMAL COAGULATION, NERVE, SPINAL, CERVICAL, POSTERIOR RAMUS, MEDIAL BRANCH C5, C6;  Surgeon: Kedar Amado MD;  Location: [facility] OR;  Service: Pain Management;  Laterality: Right;    RHIZOTOMY W/ RADIOFREQUENCY ABLATION  2016    lumbar    ROTATOR CUFF REPAIR      right    SURGICAL REMOVAL OF MASS OF UPPER EXTREMITY Left 06/20/2019    Procedure: EXCISION, MASS, UPPER EXTREMITY;  Surgeon: Max Mandel MD;  Location: [facility] OR;  Service: General;  Laterality: Left;    TRANSFORAMINAL EPIDURAL INJECTION OF STEROID Bilateral 7/11/2023    Procedure: Injection,steroid,epidural,transforaminal approach L5/S1;  Surgeon: Kedar Amado MD;  Location: [facility];  Service: Pain Management;  Laterality: Bilateral;    vocal cord surgery  1975       Social History     Socioeconomic History    Marital status:      Spouse name: Nuria    Number of children: 0   Tobacco Use    Smoking status: Never     Passive exposure: Current (one day weekly)    Smokeless tobacco: Never   Substance and Sexual Activity    Alcohol use: Yes     Comment: increased - 3 large drinks nightly    Drug use: Never    Sexual activity: Yes     Partners: Female     Birth control/protection: None     Social Determinants of Health     Financial Resource Strain: Low Risk  (10/25/2023)    Overall Financial Resource Strain (CARDIA)     Difficulty of Paying Living Expenses: Not hard at all   Food Insecurity: No Food Insecurity (10/25/2023)    Hunger Vital Sign     Worried About Running Out of Food in the Last Year: Never true      "Ran Out of Food in the Last Year: Never true   Transportation Needs: No Transportation Needs (10/25/2023)    PRAPARE - Transportation     Lack of Transportation (Medical): No     Lack of Transportation (Non-Medical): No   Physical Activity: Sufficiently Active (10/25/2023)    Exercise Vital Sign     Days of Exercise per Week: 3 days     Minutes of Exercise per Session: 50 min   Stress: No Stress Concern Present (10/25/2023)    [employer] - Occupational Stress Questionnaire     Feeling of Stress : Only a little   Social Connections: Unknown (10/25/2023)    Social Connection and Isolation Panel [NHANES]     Frequency of Communication with Friends and Family: Three times a week     Frequency of Social Gatherings with Friends and Family: Twice a week     Active Member of Clubs or Organizations: Yes     Attends Club or Organization Meetings: More than 4 times per year     Marital Status:    Housing Stability: Low Risk  (10/25/2023)    Housing Stability Vital Sign     Unable to Pay for Housing in the Last Year: No     Number of Places Lived in the Last Year: 1     Unstable Housing in the Last Year: No         Medications/Allergies: See med card    Vitals:    23 1310   BP: 124/62   Pulse: 75   Weight: (!) 144.8 kg (319 lb 3.6 oz)   Height: 5' 11" (1.803 m)   PainSc:   3   PainLoc: Back           Physical exam:  Gen: A and O x3, pleasant, well-groomed  Skin: No rashes or obvious lesions  HEENT: PERRLA, no obvious deformities on ears or in canals.Trachea midline.  CVS: Regular rate and rhythm, normal palpable pulses.  Resp: Clear to auscultation bilaterally, no wheezes or rales.  Abdomen: Soft, NT/ND.  Musculoskeletal:  No antalgic gait.     Lead insertion sites clean and dry.    Imagin19 MRI C-spine:  C2-C3: Small central disc osteophyte complex.  Minimal left facet hypertrophy.  No significant spinal canal or foraminal stenosis.  C3-C4: Mild disc osteophyte complex with " possible tiny central soft disc extrusion.  Moderate right and minimal left facet hypertrophy.  Slight ventral cord flattening with thin sliver preserved ventral and preserved dorsal CSF.  Moderate-severe right and mild-moderate left foraminal stenosis.  C4-C5: Mild disc osteophyte complex with prominent central component likely small central disc extrusion..  Mild-moderate bilateral facet hypertrophy.  Ligamentum flavum thickening.  Mild ventral cord flattening with thin sliver preserved ventral and preserved dorsal CSF.  Mild-moderate bilateral foraminal stenosis.  C5-C6: Moderate disc osteophyte complex with likely small central disc extrusion.  Mild bilateral facet hypertrophy.  Ligamentum flavum thickening.  Moderate cord flattening with effacement of ventral and thin sliver preserved dorsal CSF.  Moderate bilateral foraminal stenosis.  C6-C7: Mild disc osteophyte complex with likely small central disc extrusion.  Preserved ventral and dorsal CSF.  Moderate bilateral foraminal stenosis.  C7-T1: Minimal disc osteophyte complex.  Mild left facet hypertrophy.  No significant spinal canal or foraminal stenosis.    4/15/19 EMG lower ext:   1. Subacute on chronic, moderate to severe, right lumbosacral polyradiculopathy most prominent at the L5 nerve root, with slight active denervation in the L4, L5, and S1 territories.  2. Subacute on chronic, moderate to severe, left lumbosacral polyradiculopathy most prominent of the L5-S1 level, with slight active denervation noted at the L4 level.  3. The symmetric absence of the sural sensory response can be seen in this age group.  The absence of the superficial peroneal sensory response is likely secondary to a more proximal dorsal root ganglion in the setting of L4/5 radiculopathy.There is no convincing evidence of a peripheral neuropathy, focal neuropathy or plexopathy on this study.  In addition, no myopathic changes were noted.    4/2/19 MRI L-spine:  T12-L1: Mild disc  bulge.  Minimal bilateral facet hypertrophy.  Mild narrowing of the right greater than left lateral recess.  No significant overall spinal canal stenosis.  Mild right and minimal left foraminal stenosis.    L1-L2: Mild retrolisthesis.  Mild disc bulge and posterior osteophytic ridging.  Likely small superimposed central/right lateral recess protrusion.  Mild-moderate right facet hypertrophy.  Left facets obscured by artifact.  Mild-moderate narrowing of the bilateral lateral recesses.  Mild overall spinal canal stenosis.  Mild-moderate right and mild left foraminal stenosis.   L2-L3: Left hemilaminectomy.  Mild retrolisthesis.  Spinal canal is patent.  Moderate left and mild-moderate right foraminal stenosis.   L3-L4: Fused.  Mild disc bulge.  Partially obscured bilateral facet hypertrophy, likely moderate on the right greater than left.  Minimal narrowing of bilateral lateral recesses.  No significant overall spinal canal stenosis.  Mild right and minimal left foraminal stenosis.   L4-L5: Fused.  Laminectomy.  Spinal canal is widely patent.  Mild-moderate bilateral foraminal stenosis.   BONES: L2-5 posterior fusion with bilateral pedicle screws and posterior rods with L4 laminectomy and crosslink device at the L4 level.  Left L2-3 hemilaminectomy.  Type 1 endplate changes at L4-5.  No aggressive bone marrow signal.   PARASPINAL AREA: STIR signal hyperintensity in the bilateral dorsal paraspinal muscles from the L3-L5 levels which may represent edema or denervation change.  Bilateral dorsal paraspinal muscular atrophy.  No large fluid collection.    06/19/2023 MRI lumbar spine   There is ferromagnetic artifact related to posterior spinal fusion hardware extending from L2-L5.  There is 5 mm retrolisthesis of L2 on L3 and 4 mm retrolisthesis of L1 on L2.  There is 2 mm retrolisthesis of T12 on L1.  Levoscoliotic curvature centered at L2 and appears similar compared to CT from 04/02/2019.     Paraspinal soft tissues  appear normal.  Conus terminates at the L2 vertebral body.  Conus medullaris appears normal.  Normal appearance of the cauda equina.     T12-L1: 3 mm broad-based disc bulge with mild hypertrophic facet changes and thickening of the ligamentum flavum.  Mild central canal narrowing.  Mild-to-moderate right and minimal left foraminal narrowing.     L1-L2: Grade 1 retrolisthesis of L1 on L2.  Small posterior disc osteophyte complex is present.  Hypertrophic facet changes are obscured secondary to ferromagnetic artifact.  There is mild central canal narrowing, worst at the right lateral recess.  Severe right foraminal narrowing.  Moderate left foraminal narrowing.     L2-L3: Grade 1 retrolisthesis of L2 on L3.  Tiny posterior disc osteophyte complex is present.  There is small endplate osteophytes.  Hypertrophic facet changes are obscured secondary to ferromagnetic artifact related to the spinal fusion hardware.  No central canal stenosis.  Moderate to severe bilateral neural foraminal narrowing is present.     L3-L4: No central canal or foraminal narrowing.     L4-L5: Asymmetric posterior disc osteophyte complex on the left is present.  Hypertrophic facet changes are obscured secondary to ferromagnetic artifact related to the posterior spinal fusion hardware.  Minimal residual disc bulge.  There is mild narrowing of the left lateral recess.  Moderate to severe neural foraminal narrowing, worse on the left.     L5-S1: Minimal disc bulge with severe hypertrophic facet changes and thickening of the ligamentum flavum.  Mild central canal narrowing.  Moderate to severe right and moderate left neural foraminal narrowing.    Assessment:   The patient is a 68-year-old man with a history of hypertension, CAD, loop recorder, venous insufficiency who presents in referral from Dr. Crawford for neck pain.    1. Chronic pain disorder        2. Lumbar radiculitis              Plan:  1.  I removed the spinal cord stimulator leads after  cleaning with sterile prep and cutting sutures.  The leads were removed intact and bandages were applied to the site.  2.  Since he did well with the stimulator trial, he would like to move forward with implantation with [employer].  We have discussed the risks including migration and infection, I have answered all of his questions.  Will have him use a chlorhexidine wash the night before and the morning of the procedure. he will then follow up with us in one week after implantation for postop wound check.

## 2023-12-11 DIAGNOSIS — Z79.02 ANTIPLATELET OR ANTITHROMBOTIC LONG-TERM USE: Primary | ICD-10-CM

## 2023-12-11 DIAGNOSIS — G89.4 CHRONIC PAIN SYNDROME: Primary | ICD-10-CM

## 2023-12-11 RX ORDER — SODIUM CHLORIDE, SODIUM LACTATE, POTASSIUM CHLORIDE, CALCIUM CHLORIDE 600; 310; 30; 20 MG/100ML; MG/100ML; MG/100ML; MG/100ML
INJECTION, SOLUTION INTRAVENOUS CONTINUOUS
Status: CANCELLED | OUTPATIENT
Start: 2023-12-11

## 2023-12-11 NOTE — TELEPHONE ENCOUNTER
Spoke with patient to schedule. Patient scheduled for insertion of SCS on 1/10/24 with Dr. Amado. Per provider patient is to hold ASA x 7 and Ozempic x 7 days prior. E Consult sent to cardiology for clearance. Pre op information given and 7 day post op appointment scheduled.

## 2024-01-05 ENCOUNTER — TELEPHONE (OUTPATIENT)
Dept: SURGERY | Facility: HOSPITAL | Age: 69
End: 2024-01-05
Payer: MEDICARE

## 2024-01-05 NOTE — TELEPHONE ENCOUNTER
I think we're fine to proceed with the surgery.  Please notify us if having any worsening symptoms between now and then

## 2024-01-09 ENCOUNTER — ANESTHESIA EVENT (OUTPATIENT)
Dept: SURGERY | Facility: HOSPITAL | Age: 69
End: 2024-01-09
Payer: MEDICARE

## 2024-01-10 ENCOUNTER — HOSPITAL ENCOUNTER (OUTPATIENT)
Dept: RADIOLOGY | Facility: HOSPITAL | Age: 69
Discharge: HOME | End: 2024-01-10
Attending: ANESTHESIOLOGY | Admitting: ANESTHESIOLOGY
Payer: MEDICARE

## 2024-01-10 ENCOUNTER — ANESTHESIA (OUTPATIENT)
Dept: SURGERY | Facility: HOSPITAL | Age: 69
End: 2024-01-10
Payer: MEDICARE

## 2024-01-10 ENCOUNTER — HOSPITAL ENCOUNTER (OUTPATIENT)
Facility: HOSPITAL | Age: 69
Discharge: HOME | End: 2024-01-10
Attending: ANESTHESIOLOGY | Admitting: ANESTHESIOLOGY
Payer: MEDICARE

## 2024-01-10 VITALS
WEIGHT: 315 LBS | DIASTOLIC BLOOD PRESSURE: 70 MMHG | TEMPERATURE: 98 F | SYSTOLIC BLOOD PRESSURE: 134 MMHG | HEART RATE: 72 BPM | OXYGEN SATURATION: 96 % | RESPIRATION RATE: 16 BRPM | HEIGHT: 71 IN | BODY MASS INDEX: 44.1 KG/M2

## 2024-01-10 DIAGNOSIS — M54.50 LOWER BACK PAIN: ICD-10-CM

## 2024-01-10 DIAGNOSIS — G89.4 CHRONIC PAIN SYNDROME: ICD-10-CM

## 2024-01-10 PROCEDURE — D9220A PRA ANESTHESIA: Mod: CRNA,,, | Performed by: NURSE ANESTHETIST, CERTIFIED REGISTERED

## 2024-01-10 PROCEDURE — 25000003 PHARM REV CODE 250: Mod: PO | Performed by: ANESTHESIOLOGY

## 2024-01-10 PROCEDURE — C1713 ANCHOR/SCREW BN/BN,TIS/BN: HCPCS | Mod: PO | Performed by: ANESTHESIOLOGY

## 2024-01-10 PROCEDURE — D9220A PRA ANESTHESIA: Mod: ANES,,, | Performed by: ANESTHESIOLOGY

## 2024-01-10 PROCEDURE — 71000015 HC POSTOP RECOV 1ST HR: Mod: PO | Performed by: ANESTHESIOLOGY

## 2024-01-10 PROCEDURE — 36000706: Mod: PO | Performed by: ANESTHESIOLOGY

## 2024-01-10 PROCEDURE — C1787 PATIENT PROGR, NEUROSTIM: HCPCS | Mod: PO | Performed by: ANESTHESIOLOGY

## 2024-01-10 PROCEDURE — 27201423 OPTIME MED/SURG SUP & DEVICES STERILE SUPPLY: Mod: PO | Performed by: ANESTHESIOLOGY

## 2024-01-10 PROCEDURE — 37000008 HC ANESTHESIA 1ST 15 MINUTES: Mod: PO | Performed by: ANESTHESIOLOGY

## 2024-01-10 PROCEDURE — C1778 LEAD, NEUROSTIMULATOR: HCPCS | Mod: PO | Performed by: ANESTHESIOLOGY

## 2024-01-10 PROCEDURE — 63600175 PHARM REV CODE 636 W HCPCS: Mod: PO | Performed by: NURSE ANESTHETIST, CERTIFIED REGISTERED

## 2024-01-10 PROCEDURE — 63600175 PHARM REV CODE 636 W HCPCS: Mod: JZ,JG,PO | Performed by: ANESTHESIOLOGY

## 2024-01-10 PROCEDURE — 63685 INS/RPLC SPI NPG/RCVR POCKET: CPT | Mod: ,,, | Performed by: ANESTHESIOLOGY

## 2024-01-10 PROCEDURE — 76000 FLUOROSCOPY <1 HR PHYS/QHP: CPT | Mod: 59,TC,PO

## 2024-01-10 PROCEDURE — 71000033 HC RECOVERY, INTIAL HOUR: Mod: PO | Performed by: ANESTHESIOLOGY

## 2024-01-10 PROCEDURE — 63650 IMPLANT NEUROELECTRODES: CPT | Mod: ,,, | Performed by: ANESTHESIOLOGY

## 2024-01-10 PROCEDURE — 37000009 HC ANESTHESIA EA ADD 15 MINS: Mod: PO | Performed by: ANESTHESIOLOGY

## 2024-01-10 PROCEDURE — 27800903 OPTIME MED/SURG SUP & DEVICES OTHER IMPLANTS: Mod: PO | Performed by: ANESTHESIOLOGY

## 2024-01-10 PROCEDURE — 63600175 PHARM REV CODE 636 W HCPCS: Mod: PO | Performed by: ANESTHESIOLOGY

## 2024-01-10 PROCEDURE — C1820 GENERATOR NEURO RECHG BAT SY: HCPCS | Mod: PO | Performed by: ANESTHESIOLOGY

## 2024-01-10 PROCEDURE — 36000707: Mod: PO | Performed by: ANESTHESIOLOGY

## 2024-01-10 PROCEDURE — 25000003 PHARM REV CODE 250: Mod: PO | Performed by: NURSE ANESTHETIST, CERTIFIED REGISTERED

## 2024-01-10 DEVICE — ANCHOR LEAD NEROSTIMLTR CLIK X: Type: IMPLANTABLE DEVICE | Site: BACK | Status: FUNCTIONAL

## 2024-01-10 DEVICE — SYS WAVEWRITER ALPH SCS 21.6CC: Type: IMPLANTABLE DEVICE | Site: BACK | Status: FUNCTIONAL

## 2024-01-10 DEVICE — LEAD ST LINEAR 70CM: Type: IMPLANTABLE DEVICE | Site: BACK | Status: FUNCTIONAL

## 2024-01-10 RX ORDER — FENTANYL CITRATE 50 UG/ML
INJECTION, SOLUTION INTRAMUSCULAR; INTRAVENOUS
Status: DISCONTINUED | OUTPATIENT
Start: 2024-01-10 | End: 2024-01-10

## 2024-01-10 RX ORDER — MIDAZOLAM HYDROCHLORIDE 1 MG/ML
INJECTION, SOLUTION INTRAMUSCULAR; INTRAVENOUS
Status: DISCONTINUED | OUTPATIENT
Start: 2024-01-10 | End: 2024-01-10

## 2024-01-10 RX ORDER — OXYCODONE HYDROCHLORIDE 5 MG/1
5 TABLET ORAL
Status: DISCONTINUED | OUTPATIENT
Start: 2024-01-10 | End: 2024-01-10 | Stop reason: HOSPADM

## 2024-01-10 RX ORDER — LIDOCAINE HYDROCHLORIDE AND EPINEPHRINE 10; 10 MG/ML; UG/ML
INJECTION, SOLUTION INFILTRATION; PERINEURAL
Status: DISCONTINUED | OUTPATIENT
Start: 2024-01-10 | End: 2024-01-10 | Stop reason: HOSPADM

## 2024-01-10 RX ORDER — SODIUM CHLORIDE 0.9 % (FLUSH) 0.9 %
3 SYRINGE (ML) INJECTION
Status: DISCONTINUED | OUTPATIENT
Start: 2024-01-10 | End: 2024-01-10 | Stop reason: HOSPADM

## 2024-01-10 RX ORDER — PROPOFOL 10 MG/ML
VIAL (ML) INTRAVENOUS
Status: DISCONTINUED | OUTPATIENT
Start: 2024-01-10 | End: 2024-01-10

## 2024-01-10 RX ORDER — KETAMINE HCL IN 0.9 % NACL 50 MG/5 ML
SYRINGE (ML) INTRAVENOUS
Status: DISCONTINUED | OUTPATIENT
Start: 2024-01-10 | End: 2024-01-10

## 2024-01-10 RX ORDER — ACETAMINOPHEN 10 MG/ML
INJECTION, SOLUTION INTRAVENOUS
Status: DISCONTINUED | OUTPATIENT
Start: 2024-01-10 | End: 2024-01-10

## 2024-01-10 RX ORDER — LIDOCAINE HYDROCHLORIDE 10 MG/ML
1 INJECTION, SOLUTION EPIDURAL; INFILTRATION; INTRACAUDAL; PERINEURAL ONCE
Status: DISCONTINUED | OUTPATIENT
Start: 2024-01-10 | End: 2024-01-10 | Stop reason: HOSPADM

## 2024-01-10 RX ORDER — PHENYLEPHRINE HYDROCHLORIDE 10 MG/ML
INJECTION INTRAVENOUS
Status: DISCONTINUED | OUTPATIENT
Start: 2024-01-10 | End: 2024-01-10

## 2024-01-10 RX ORDER — HYDROCODONE BITARTRATE AND ACETAMINOPHEN 7.5; 325 MG/1; MG/1
1 TABLET ORAL EVERY 6 HOURS PRN
Qty: 20 TABLET | Refills: 0 | Status: SHIPPED | OUTPATIENT
Start: 2024-01-10 | End: 2024-02-09

## 2024-01-10 RX ORDER — DEXMEDETOMIDINE HYDROCHLORIDE 100 UG/ML
INJECTION, SOLUTION INTRAVENOUS
Status: DISCONTINUED | OUTPATIENT
Start: 2024-01-10 | End: 2024-01-10

## 2024-01-10 RX ORDER — BUPIVACAINE HYDROCHLORIDE 2.5 MG/ML
INJECTION, SOLUTION EPIDURAL; INFILTRATION; INTRACAUDAL
Status: DISCONTINUED | OUTPATIENT
Start: 2024-01-10 | End: 2024-01-10 | Stop reason: HOSPADM

## 2024-01-10 RX ORDER — LIDOCAINE HYDROCHLORIDE 20 MG/ML
INJECTION INTRAVENOUS
Status: DISCONTINUED | OUTPATIENT
Start: 2024-01-10 | End: 2024-01-10

## 2024-01-10 RX ORDER — CEFAZOLIN SODIUM 1 G/3ML
INJECTION, POWDER, FOR SOLUTION INTRAMUSCULAR; INTRAVENOUS
Status: DISCONTINUED | OUTPATIENT
Start: 2024-01-10 | End: 2024-01-10

## 2024-01-10 RX ORDER — SODIUM CHLORIDE, SODIUM LACTATE, POTASSIUM CHLORIDE, CALCIUM CHLORIDE 600; 310; 30; 20 MG/100ML; MG/100ML; MG/100ML; MG/100ML
INJECTION, SOLUTION INTRAVENOUS CONTINUOUS
Status: DISCONTINUED | OUTPATIENT
Start: 2024-01-10 | End: 2024-01-10 | Stop reason: HOSPADM

## 2024-01-10 RX ADMIN — PROPOFOL 75 MCG/KG/MIN: 10 INJECTION, EMULSION INTRAVENOUS at 12:01

## 2024-01-10 RX ADMIN — ACETAMINOPHEN 1000 MG: 10 INJECTION, SOLUTION INTRAVENOUS at 01:01

## 2024-01-10 RX ADMIN — FENTANYL CITRATE 50 MCG: 0.05 INJECTION, SOLUTION INTRAMUSCULAR; INTRAVENOUS at 01:01

## 2024-01-10 RX ADMIN — MIDAZOLAM HYDROCHLORIDE 2 MG: 1 INJECTION, SOLUTION INTRAMUSCULAR; INTRAVENOUS at 01:01

## 2024-01-10 RX ADMIN — DEXMEDETOMIDINE HYDROCHLORIDE 20 MCG: 100 INJECTION, SOLUTION, CONCENTRATE INTRAVENOUS at 01:01

## 2024-01-10 RX ADMIN — GLYCOPYRROLATE 0.2 MG: 0.2 INJECTION, SOLUTION INTRAMUSCULAR; INTRAVENOUS at 01:01

## 2024-01-10 RX ADMIN — LIDOCAINE HYDROCHLORIDE 30 MG: 20 INJECTION INTRAVENOUS at 12:01

## 2024-01-10 RX ADMIN — Medication 25 MG: at 12:01

## 2024-01-10 RX ADMIN — PROPOFOL 40 MG: 10 INJECTION, EMULSION INTRAVENOUS at 12:01

## 2024-01-10 RX ADMIN — SODIUM CHLORIDE, POTASSIUM CHLORIDE, SODIUM LACTATE AND CALCIUM CHLORIDE: 600; 310; 30; 20 INJECTION, SOLUTION INTRAVENOUS at 12:01

## 2024-01-10 RX ADMIN — PHENYLEPHRINE HYDROCHLORIDE 200 MCG: 10 INJECTION INTRAVENOUS at 01:01

## 2024-01-10 RX ADMIN — SODIUM CHLORIDE, POTASSIUM CHLORIDE, SODIUM LACTATE AND CALCIUM CHLORIDE: 600; 310; 30; 20 INJECTION, SOLUTION INTRAVENOUS at 02:01

## 2024-01-10 RX ADMIN — Medication 10 MG: at 01:01

## 2024-01-10 RX ADMIN — CEFAZOLIN 2 G: 330 INJECTION, POWDER, FOR SOLUTION INTRAMUSCULAR; INTRAVENOUS at 01:01

## 2024-01-10 RX ADMIN — MIDAZOLAM HYDROCHLORIDE 2 MG: 1 INJECTION, SOLUTION INTRAMUSCULAR; INTRAVENOUS at 12:01

## 2024-01-10 NOTE — OP NOTE
PROCEDURE DATE: 1/10/2024    Spinal Cord Stimulator Implantation  PROCEDURE:   1. Spinal Cord Stimulator leads placement x 2 and implant- 16 contact total  2. Pulse Generator Implantation under flouroscopy  3. Programming greater than 30 mins    Pre-op diagnosis: Chronic pain syndrome    Post-op diagnosis: Same    Surgeon: Dr. Kedar Amado    Assistant: none     Anesthesia: Monitored Anesthesia Care    Estimated Blood Loss: Minimal- <20ml    Urine Output: Not Measured    IV Fluids- See Anesthesia record    Complications: none    CONSENT: The risks, benefits, and options were discussed thoroughly with the patient. The patient's questions were answered. The patient understands the risk and benefits and wishes to proceed. Informed consent was obtained.     PROCEDURE NOTE:  Patient is s/p a successful trial of spinal cord stimulation and scheduled for a stage 2 implant. After obtaining informed consent, pre-op antibiotic was started 30 minutes prior to incision. Site of the implantable pulse generator was marked on the patients left side in the preoperative area. The patient was taken to the OR and placed in the prone position. The anesthesia provider throughout the case provided sedation and cardiopulmonary monitoring. The Patient's back was prepped with Duraprep and then draped in usual sterile fashion.     An AP fluoroscopic view was obtained to identify and alicia the midline position of the spinous process. The skin was anesthetized with Lidocaine 1% with 1:100,000 epinephrine. Skin incision with a No. 10 scalpel was made and local dissection done with electrocautery as necessary to facilitate access to the paraspinous fascia.     Using a paramedian approach, a Tuohy needle was entered into the T12/L1-side right epidural space using a loss of resistance technique with 0.5cc of air. A similar approach was done on the left side at T11/12-side. Then, after negative aspiration of blood, CSF, or any paraesthesia, the  SCS leads were advanced to the top of the T7 vertebral body in the midline. Stimulation was carried and patient reported coverage of pain areas.     Then, the Tuohy needles were removed under fluoroscopy and a lead anchor placed over the leads. Using 0-0 Orthocord sutures, leads were anchored to the fascia with 2 sutures for each lead. A relief loop was then placed. Then further local anesthetic was used at the IPG site with 1% lidocaine. Using a No. 15 scalpel, an incision was made over the left flank and dissection carried out with Bovie and blunt dissection. After obtaining hemostasis, both the incisions were irrigated with antibiotic solutions. Using the tunneling tool, tunneling was completed between the midline and the IPG pocket. The leads were passed to the IPG and connected. Then the IPG was placed in the IPG pocket and system was checked and noted to be in adequate position. Copious antibiotic bulb lavage was irrigated throughout the field, and hemostasis was maintained.     Then the wound was closed with simple interrupted sutures using 0-0 vicryl sutures and then 2-0 vicryl sutures in 2 layers. 0.25% bupivicaine was injected into the incision sites and the skin closed with 4-0 monocryl. Steristrips were placed over the incision sites and dressings applied. Sponge and needle counts were correct x2 at conclusion of the case.     Patient was then placed supine on the stretcher and transferred to the recovery room. Patient was programmed by the representative of the SCS. Patient was instructed not to perform any abrupt movements with the back, avoid bending, twisting, or lifting >10lbs. The patient has been scheduled to return to the clinic in approx 7 days. The patient tolerated the procedure well.

## 2024-01-10 NOTE — H&P
CC: Back pain    HPI: The patient is a 69yo man with a history of chronic pain syndrome, lumbar radiculitis here for SCS implant. There are no major changes in history and physical from 12/8/23.    Past Medical History:   Diagnosis Date    Amaurosis fugax of right eye 04/2014    resolved    Anticoagulant long-term use     Arm mass, left 06/20/2019    Bilateral chronic knee pain 01/10/2017    Bilateral venous insufficiency     legs    Calculus of gallbladder without mention of cholecystitis or obstruction     Cardiac arrhythmia     has implanted loop recorder, palpitations. Hx dizziness on bending over    Cellulitis and abscess of leg 04/2014    treated at wound care center, resolving    Cervical spondylosis 01/17/2020    Coronary artery disease     denies chest pain, denies MI, no stents; sees Dr Aldo PARSONSID-19 10/3/22 10/03/2022    Effusion, left knee 05/23/2017    Estrogen excess 08/2017    Generalized osteoarthrosis, involving multiple sites     Hyperlipidemia     Hypertension     Hypertension 10/07/2015    Hypothyroidism     Knee pain     right far worse than left    Low back pain 08/10/2015    Lymphedema 08/01/2021    Malaise and fatigue 05/23/2017    Morbidly obese 10/12/2022    Obesity, unspecified     Occlusion and stenosis of carotid artery without mention of cerebral infarction     Primary osteoarthritis of right knee 08/2017    Sleep apnea with use of continuous positive airway pressure (CPAP)     Status post placement of implantable loop recorder     Thyroid disease     Transient arterial occlusion of retina     Transient visual loss     BOTH EYE  PER DR CAMPOVERDE  7/27/2015       Past Surgical History:   Procedure Laterality Date    BACK SURGERY N/A     decompression of L2 to L5     CARDIAC SURGERY  2013?    insertion loop recorder, and then removed    CARDIAC SURGERY  2014    transesophageal echo; no clot seen, per patient    CARPAL TUNNEL RELEASE      bilateral    CAUDAL EPIDURAL STEROID  INJECTION N/A 8/25/2023    Procedure: Injection-steroid-epidural-caudal;  Surgeon: Kedar Amado MD;  Location: [facility] OR;  Service: Pain Management;  Laterality: N/A;    CHOLECYSTECTOMY      COLONOSCOPY  09/26/2016    Dr. Ryley ANDRES Right     HIATAL HERNIA REPAIR  2011    INJECTION OF ANESTHETIC AGENT AROUND MEDIAL BRANCH NERVES INNERVATING CERVICAL FACET JOINT Right 01/17/2020    Procedure: Block-nerve-medial branch-cervical C5, C6;  Surgeon: Kedar Amado MD;  Location: [facility] OR;  Service: Pain Management;  Laterality: Right;    INJECTION OF ANESTHETIC AGENT AROUND MEDIAL BRANCH NERVES INNERVATING LUMBAR FACET JOINT Bilateral 09/11/2020    Procedure: BLOCK, NERVE, FACET JOINT, LUMBAR, MEDIAL BRANCH L4 and L5;  Surgeon: Kedar Amado MD;  Location: [facility] OR;  Service: Pain Management;  Laterality: Bilateral;    injection SI joint  7/201    INSERTION OF DORSAL COLUMN NERVE STIMULATOR FOR TRIAL N/A 12/4/2023    Procedure: INSERTION, NEUROSTIMULATOR, SPINAL CORD, DORSAL COLUMN, FOR TRIAL;  Surgeon: Kedar Amado MD;  Location: [facility] OR;  Service: Pain Management;  Laterality: N/A;    JOINT REPLACEMENT      right knee   august 2017    KNEE ARTHROSCOPY Bilateral     right x 2; left x 1    LAPAROSCOPIC GASTRIC BANDING      LAPAROSCOPIC GASTRIC BANDING      LHC      LUMBAR EPIDURAL INJECTION      POLYPECTOMY      vocal cord    RADIOFREQUENCY ABLATION OF LUMBAR MEDIAL BRANCH NERVE AT SINGLE LEVEL Bilateral 09/23/2020    Procedure: Radiofrequency Ablation, Nerve, Spinal, Lumbar, Medial Branch L4 and L5;  Surgeon: Kedar Amado MD;  Location: [facility] OR;  Service: Pain Management;  Laterality: Bilateral;    RADIOFREQUENCY THERMAL COAGULATION OF MEDIAL BRANCH OF POSTERIOR RAMUS OF CERVICAL SPINAL NERVE Right 02/06/2020    Procedure: RADIOFREQUENCY THERMAL COAGULATION, NERVE, SPINAL, CERVICAL, POSTERIOR RAMUS, MEDIAL BRANCH C5, C6;  Surgeon: Kedar Amado MD;  Location: [facility] OR;  Service:  Pain Management;  Laterality: Right;    RHIZOTOMY W/ RADIOFREQUENCY ABLATION  2016    lumbar    ROTATOR CUFF REPAIR      right    SURGICAL REMOVAL OF MASS OF UPPER EXTREMITY Left 06/20/2019    Procedure: EXCISION, MASS, UPPER EXTREMITY;  Surgeon: Max Mandel MD;  Location: [facility];  Service: General;  Laterality: Left;    TRANSFORAMINAL EPIDURAL INJECTION OF STEROID Bilateral 7/11/2023    Procedure: Injection,steroid,epidural,transforaminal approach L5/S1;  Surgeon: Kedar Amado MD;  Location: [facility];  Service: Pain Management;  Laterality: Bilateral;    vocal cord surgery  1975       Family History   Problem Relation Age of Onset    Diabetes Mother     Heart disease Mother     Alzheimer's disease Mother     Diabetes Father     Heart disease Father     Arthritis Sister     Arthritis Brother     Arthritis Brother     Arthritis Brother        Social History     Socioeconomic History    Marital status:      Spouse name: Nuria    Number of children: 0   Tobacco Use    Smoking status: Never     Passive exposure: Current (one day weekly)    Smokeless tobacco: Never   Substance and Sexual Activity    Alcohol use: Yes     Comment: increased - 3 large drinks nightly    Drug use: Never    Sexual activity: Yes     Partners: Female     Birth control/protection: None     Social Determinants of Health     Financial Resource Strain: Low Risk  (1/1/2024)    Overall Financial Resource Strain (CARDIA)     Difficulty of Paying Living Expenses: Not hard at all   Food Insecurity: No Food Insecurity (1/1/2024)    Hunger Vital Sign     Worried About Running Out of Food in the Last Year: Never true     Ran Out of Food in the Last Year: Never true   Transportation Needs: No Transportation Needs (1/1/2024)    PRAPARE - Transportation     Lack of Transportation (Medical): No     Lack of Transportation (Non-Medical): No   Physical Activity: Sufficiently Active (1/1/2024)    Exercise Vital Sign     Days of Exercise per  "Week: 3 days     Minutes of Exercise per Session: 60 min   Stress: No Stress Concern Present (1/1/2024)    [employer] [place] [employer] - Occupational Stress Questionnaire     Feeling of Stress : Only a little   Social Connections: Unknown (1/1/2024)    Social Connection and Isolation Panel [NHANES]     Frequency of Communication with Friends and Family: Twice a week     Frequency of Social Gatherings with Friends and Family: Once a week     Active Member of Clubs or Organizations: Yes     Attends Club or Organization Meetings: More than 4 times per year     Marital Status:    Housing Stability: Low Risk  (1/1/2024)    Housing Stability Vital Sign     Unable to Pay for Housing in the Last Year: No     Number of Places Lived in the Last Year: 1     Unstable Housing in the Last Year: No       No current facility-administered medications for this encounter.     Facility-Administered Medications Ordered in Other Encounters   Medication Dose Route Frequency Provider Last Rate Last Admin    lactated ringers infusion   Intravenous Continuous Kedar Amado MD   Stopped at 07/11/23 1500    LIDOcaine (PF) 10 mg/ml (1%) injection 10 mg  1 mL Intradermal Once Kedar Amado MD           Review of patient's allergies indicates:   Allergen Reactions    Lamisil [terbinafine] Rash     Emollient form only , also had Bactrim in it, not sure       Vitals:    01/05/24 1208 01/10/24 1236   BP:  138/71   Pulse:  (!) 52   Resp:  17   Temp:  97.3 °F (36.3 °C)   TempSrc:  Skin   SpO2:  97%   Weight: (!) 145.8 kg (321 lb 6.9 oz)    Height: 5' 11" (1.803 m)        ASA 2, Mallampati 2    REVIEW OF SYSTEMS:     GENERAL: No weight loss, malaise or fevers.  HEENT:  No recent changes in vision or hearing  NECK: Negative for lumps, no difficulty with swallowing.  RESPIRATORY: Negative for cough, wheezing or shortness of breath, patient denies any recent URI.  CARDIOVASCULAR: Negative for chest pain, leg swelling or " palpitations.  GI: Negative for abdominal discomfort, blood in stools or black stools or change in bowel habits.  MUSCULOSKELETAL: See HPI.  SKIN: Negative for lesions, rash, and itching.  PSYCH: No suicidal or homicidal ideations, no current mood disturbances.  HEMATOLOGY/LYMPHOLOGY: Negative for prolonged bleeding, bruising easily or swollen nodes. Patient is not currently taking any anti-coagulants  ENDO: No history of diabetes or thyroid dysfunction  NEURO: No history of syncope, paralysis, seizures or tremors.All other reviewed and negative other than HPI.    Physical exam:  Gen: A and O x3, pleasant, well-groomed  Skin: No rashes or obvious lesions  HEENT: PERRLA, no obvious deformities on ears or in canals. No thyroid masses, trachea midline, no palpable lymph nodes in neck, axilla.  CVS: Regular rate and rhythm, normal S1 and S2, no murmurs.  Resp: Clear to auscultation bilaterally.  Abdomen: Soft, NT/ND, normal bowel sounds present.  Musculoskeletal/Neuro: Moving all extremities    Assessment:  Chronic pain syndrome  -     Case Request Operating Room: INSERTION, NEUROSTIMULATOR, SPINAL CORD, DORSAL COLUMN    Other orders  -     Vital signs; Standing  -     Insert peripheral IV; Standing  -     Use 1% lidocaine at IV site; Standing  -     electrolyte-S (ISOLYTE)  -     Notify Anesthesiologist if Patient on Home Insulin Pump; Standing  -     LIDOcaine (PF) 10 mg/ml (1%) injection 10 mg  -     Admit to Phase I recovery, transfer to phase II level of care when Moni score is 9 out of 10; Standing  -     Vital signs; Standing  -     oxyCODONE immediate release tablet 5 mg  -     Intake and output Per protocol; Standing  -     Apply warming blanket; Standing  -     Discharge home from Phase II when PADSS scoring system score is 9 to 10 on PADSS Scoring system met; Standing  -     POCT glucose; Standing  -     Notify Anesthesiologist; Standing  -     Notify anesthesiologist after 2 hours if Phase II level of  care criteria not met; Standing  -     Notify Physician - Potential Need of Opioid Reversal; Standing  -     sodium chloride 0.9% flush 3 mL  -     Place in Outpatient; Standing  -     Vital signs; Standing  -     Place 18-22 gauage peripheral IV ; Standing  -     Verify informed consent; Standing  -     Notify physician ; Standing  -     Notify physician ; Standing  -     Notify physician (specify); Standing  -     Diet NPO; Standing  -     lactated ringers infusion  -     IP VTE LOW RISK PATIENT; Standing  -     ceFAZolin (ANCEF) 3 gram in dextrose 5% IVPB

## 2024-01-10 NOTE — DISCHARGE INSTRUCTIONS
SPINAL CORD STIMULATOR    Sponge bath only until follow up with the doctor.  No bending, lifting or twisting.   Do not make any movements that cause bandages to pull.  Do not lift your elbows higher than your shoulders.  Do not remove dressings.  No strenuous activities.  Follow up with your physician in one week.  Call your doctor for excessive bleeding or swelling.  Rep will call you tomorrow. Phone number is on the box.    Call [phone] for doctor.

## 2024-01-10 NOTE — TRANSFER OF CARE
"Anesthesia Transfer of Care Note    Patient: Arthur Moreno    Procedure(s) Performed: Procedure(s) (LRB):  INSERTION, NEUROSTIMULATOR, SPINAL CORD, DORSAL COLUMN (N/A)    Patient location: PACU    Anesthesia Type: MAC    Transport from OR: Transported from OR on room air with adequate spontaneous ventilation    Post pain: adequate analgesia    Post assessment: no apparent anesthetic complications and tolerated procedure well    Post vital signs: stable    Level of consciousness: responds to stimulation    Nausea/Vomiting: no nausea/vomiting    Complications: none    Transfer of care protocol was followed      Last vitals: Visit Vitals  /71 (BP Location: Right arm, Patient Position: Lying)   Pulse (!) 52   Temp 36.3 °C (97.3 °F) (Skin)   Resp 17   Ht 5' 11" (1.803 m)   Wt (!) 145.8 kg (321 lb 6.9 oz)   SpO2 97%   BMI 44.83 kg/m²     "

## 2024-01-10 NOTE — DISCHARGE SUMMARY
[facility] - Surgery  Discharge Note  Short Stay    Procedure(s) (LRB):  INSERTION, NEUROSTIMULATOR, SPINAL CORD, DORSAL COLUMN (N/A)      OUTCOME: Patient tolerated treatment/procedure well without complication and is now ready for discharge.    DISPOSITION: Home or Self Care    FINAL DIAGNOSIS:  Chronic pain syndrome    FOLLOWUP: In clinic    DISCHARGE INSTRUCTIONS:    Discharge Procedure Orders   Diet Adult Regular     No dressing needed     Notify your health care provider if you experience any of the following:  temperature >100.4     Activity as tolerated

## 2024-01-11 NOTE — ANESTHESIA POSTPROCEDURE EVALUATION
Anesthesia Post Evaluation    Patient: Arthur Moreno    Procedure(s) Performed: Procedure(s) (LRB):  INSERTION, NEUROSTIMULATOR, SPINAL CORD, DORSAL COLUMN (N/A)    Final Anesthesia Type: MAC      Patient location during evaluation: PACU  Patient participation: Yes- Able to Participate  Level of consciousness: awake and alert  Post-procedure vital signs: reviewed and stable  Pain management: adequate  Airway patency: patent    PONV status at discharge: No PONV  Anesthetic complications: no      Cardiovascular status: blood pressure returned to baseline  Respiratory status: unassisted and room air  Hydration status: euvolemic  Follow-up not needed.              Vitals Value Taken Time   /70 01/10/24 1525   Temp 36.6 °C (97.9 °F) 01/10/24 1451   Pulse 72 01/10/24 1525   Resp 16 01/10/24 1525   SpO2 96 % 01/10/24 1525         Event Time   Out of Recovery 15:00:00         Pain/Moni Score: Moni Score: 10 (1/10/2024  3:26 PM)

## 2024-01-11 NOTE — ANESTHESIA PREPROCEDURE EVALUATION
01/11/2024  Arthur Moreno is a 68 y.o., male.      Pre-op Assessment    I have reviewed the Patient Summary Reports.     I have reviewed the Nursing Notes. I have reviewed the NPO Status.   I have reviewed the Medications.     Review of Systems  Anesthesia Hx:  No problems with previous Anesthesia                Social:  Non-Smoker       Cardiovascular:     Hypertension, well controlled   CAD    Dysrhythmias       hyperlipidemia    Venous insufficiency BLE     Coronary Artery Disease:                            Hypertension         Pulmonary:        Sleep Apnea     Obstructive Sleep Apnea (LIBERTAD).           Renal/:  Renal/ Normal                 Hepatic/GI:      Liver Disease,         Liver Disease        Musculoskeletal:  Arthritis        Arthritis     Spine Disorders: cervical and lumbar            Neurological:  TIA,  Neuromuscular Disease,           Arthritis               TIA - Transient Ischemic Attack             Neuromuscular Disease   Endocrine:   Hypothyroidism       Hypothyroidism        Obesity / BMI > 30, Morbid Obesity / BMI > 40      Physical Exam  General: Well nourished, Cooperative, Alert and Oriented    Airway:  Mallampati: II   Mouth Opening: Normal  TM Distance: Normal  Neck ROM: Normal ROM      Anesthesia Plan  Type of Anesthesia, risks & benefits discussed:    Anesthesia Type: MAC  Intra-op Monitoring Plan: Standard ASA Monitors  Post Op Pain Control Plan: multimodal analgesia and IV/PO Opioids PRN  Induction:  IV  Informed Consent: Informed consent signed with the Patient and all parties understand the risks and agree with anesthesia plan.  All questions answered.   ASA Score: 3  Day of Surgery Review of History & Physical: H&P Update referred to the surgeon/provider.    Ready For Surgery From Anesthesia Perspective.     .

## 2024-01-16 ENCOUNTER — OFFICE VISIT (OUTPATIENT)
Dept: PAIN MEDICINE | Facility: CLINIC | Age: 69
End: 2024-01-16
Payer: MEDICARE

## 2024-01-16 VITALS
WEIGHT: 315 LBS | SYSTOLIC BLOOD PRESSURE: 137 MMHG | HEIGHT: 71 IN | DIASTOLIC BLOOD PRESSURE: 65 MMHG | BODY MASS INDEX: 44.1 KG/M2 | HEART RATE: 62 BPM

## 2024-01-16 DIAGNOSIS — M54.16 LUMBAR RADICULITIS: ICD-10-CM

## 2024-01-16 DIAGNOSIS — G89.4 CHRONIC PAIN SYNDROME: Primary | ICD-10-CM

## 2024-01-16 PROCEDURE — 3075F SYST BP GE 130 - 139MM HG: CPT | Mod: CPTII,S$GLB,, | Performed by: PHYSICIAN ASSISTANT

## 2024-01-16 PROCEDURE — 1159F MED LIST DOCD IN RCRD: CPT | Mod: CPTII,S$GLB,, | Performed by: PHYSICIAN ASSISTANT

## 2024-01-16 PROCEDURE — 99024 POSTOP FOLLOW-UP VISIT: CPT | Mod: S$GLB,,, | Performed by: PHYSICIAN ASSISTANT

## 2024-01-16 PROCEDURE — 1101F PT FALLS ASSESS-DOCD LE1/YR: CPT | Mod: CPTII,S$GLB,, | Performed by: PHYSICIAN ASSISTANT

## 2024-01-16 PROCEDURE — 3288F FALL RISK ASSESSMENT DOCD: CPT | Mod: CPTII,S$GLB,, | Performed by: PHYSICIAN ASSISTANT

## 2024-01-16 PROCEDURE — 1160F RVW MEDS BY RX/DR IN RCRD: CPT | Mod: CPTII,S$GLB,, | Performed by: PHYSICIAN ASSISTANT

## 2024-01-16 PROCEDURE — 99999 PR PBB SHADOW E&M-EST. PATIENT-LVL IV: CPT | Mod: PBBFAC,,, | Performed by: PHYSICIAN ASSISTANT

## 2024-01-16 PROCEDURE — 1125F AMNT PAIN NOTED PAIN PRSNT: CPT | Mod: CPTII,S$GLB,, | Performed by: PHYSICIAN ASSISTANT

## 2024-01-16 PROCEDURE — 3078F DIAST BP <80 MM HG: CPT | Mod: CPTII,S$GLB,, | Performed by: PHYSICIAN ASSISTANT

## 2024-01-16 NOTE — PROGRESS NOTES
This note was completed with dictation software and grammatical errors may exist.    CC:Neck pain, back pain    HPI:  The patient is a 68-year-old man with a history of hypertension, CAD, loop recorder, venous insufficiency who presents in referral from Dr. Crawford for neck pain.  He returns in follow-up today 7 days postop spinal cord stimulator implant with [employer].  He reports 90% relief of his symptoms.  He is pleased with these results.  He does note some itching at the incision sites but overall is doing well.  He denies fever or chills.    Previous history:  The patient is a 64-year-old man with a history of CAD on aspirin, obesity, lumbar stenosis status post surgery, gout who presents in referral from Dr. Crawford for right-sided neck pain. Patient denies any specific trauma, reports having no longstanding history of neck pain.  However about 18 months ago he began having discomfort in the right side of his neck.  It is worse with turning his head side to side especially with right lateral rotation.  It stays in the right neck and radiates up to the occiput but denies any radiation into the parietal region, no headaches with involved with this.  He denies any major radiation into the arms, no numbness or tingling, no weakness in his arms.  He denies any balance issues or dexterity issues.  He denies any major symptoms on the left side.  He describes the pain as aching and dull and sharp, worse with flexion and extension of the neck and lateral rotation, does get some relief with lying down.    Pain intervention history:  He has been doing physical therapy for his neck, has not had much benefit with this.  He does have a history of lumbar stenosis, had undergone epidural steroid injections and radiofrequency ablation but eventually the pain worsened and he underwent surgery by Dr. Johnson.  He is status post L2-L5 decompression in 2016. He is status post right C5 and C6 medial branch block on 01/17/2020  with greater than 80% relief and then radiofrequency ablation procedure on 02/06/2020 with what he describes as 50% relief of his neck pain.   He is status post bilateral L4 and L5 medial branch radiofrequency ablation on 09/23/2020 with 0% relief.  He is status post bilateral L5/S1 RIGO on 07/11/2023 with initially 70% relief lasting roughly 4 weeks and now 0% relief.    Spine surgeries:    Antineuropathics:  NSAIDs:  Physical therapy:  Antidepressants:  Muscle relaxers:  Opioids:  Antiplatelets/Anticoagulants:    ROS:  He reports easy bruising and back pain.  Balance of review of systems is negative.    Past Medical History:   Diagnosis Date    Amaurosis fugax of right eye 04/2014    resolved    Anticoagulant long-term use     Arm mass, left 06/20/2019    Bilateral chronic knee pain 01/10/2017    Bilateral venous insufficiency     legs    Calculus of gallbladder without mention of cholecystitis or obstruction     Cardiac arrhythmia     has implanted loop recorder, palpitations. Hx dizziness on bending over    Cellulitis and abscess of leg 04/2014    treated at wound care center, resolving    Cervical spondylosis 01/17/2020    Coronary artery disease     denies chest pain, denies MI, no stents; sees Dr Carlson    COVID-19 10/3/22 10/03/2022    Effusion, left knee 05/23/2017    Estrogen excess 08/2017    Generalized osteoarthrosis, involving multiple sites     Hyperlipidemia     Hypertension     Hypertension 10/07/2015    Hypothyroidism     Knee pain     right far worse than left    Low back pain 08/10/2015    Lymphedema 08/01/2021    Malaise and fatigue 05/23/2017    Morbidly obese 10/12/2022    Obesity, unspecified     Occlusion and stenosis of carotid artery without mention of cerebral infarction     Primary osteoarthritis of right knee 08/2017    Sleep apnea with use of continuous positive airway pressure (CPAP)     Status post placement of implantable loop recorder     Thyroid disease     Transient arterial  occlusion of retina     Transient visual loss     BOTH EYE  PER DR CAMPOVERDE  7/27/2015       Past Surgical History:   Procedure Laterality Date    BACK SURGERY N/A     decompression of L2 to L5     CARDIAC SURGERY  2013?    insertion loop recorder, and then removed    CARDIAC SURGERY  2014    transesophageal echo; no clot seen, per patient    CARPAL TUNNEL RELEASE      bilateral    CAUDAL EPIDURAL STEROID INJECTION N/A 8/25/2023    Procedure: Injection-steroid-epidural-caudal;  Surgeon: Kedar Amado MD;  Location: [facility] OR;  Service: Pain Management;  Laterality: N/A;    CHOLECYSTECTOMY      COLONOSCOPY  09/26/2016    Dr. Ryley ANDRES Right     HIATAL HERNIA REPAIR  2011    INJECTION OF ANESTHETIC AGENT AROUND MEDIAL BRANCH NERVES INNERVATING CERVICAL FACET JOINT Right 01/17/2020    Procedure: Block-nerve-medial branch-cervical C5, C6;  Surgeon: Kedar Amado MD;  Location: [facility] OR;  Service: Pain Management;  Laterality: Right;    INJECTION OF ANESTHETIC AGENT AROUND MEDIAL BRANCH NERVES INNERVATING LUMBAR FACET JOINT Bilateral 09/11/2020    Procedure: BLOCK, NERVE, FACET JOINT, LUMBAR, MEDIAL BRANCH L4 and L5;  Surgeon: Kedar Amado MD;  Location: [facility] OR;  Service: Pain Management;  Laterality: Bilateral;    injection SI joint  7/201    INSERTION OF DORSAL COLUMN NERVE STIMULATOR FOR TRIAL N/A 12/4/2023    Procedure: INSERTION, NEUROSTIMULATOR, SPINAL CORD, DORSAL COLUMN, FOR TRIAL;  Surgeon: Kedar Amado MD;  Location: [facility] OR;  Service: Pain Management;  Laterality: N/A;    INSERTION OF NEUROSTIMULATOR OF DORSAL COLUMN OF SPINAL CORD N/A 1/10/2024    Procedure: INSERTION, NEUROSTIMULATOR, SPINAL CORD, DORSAL COLUMN;  Surgeon: Kedar Amado MD;  Location: [facility] OR;  Service: Pain Management;  Laterality: N/A;    JOINT REPLACEMENT      right knee   august 2017    KNEE ARTHROSCOPY Bilateral     right x 2; left x 1    LAPAROSCOPIC GASTRIC BANDING      LAPAROSCOPIC GASTRIC  BANDING      [facility]      LUMBAR EPIDURAL INJECTION      POLYPECTOMY      vocal cord    RADIOFREQUENCY ABLATION OF LUMBAR MEDIAL BRANCH NERVE AT SINGLE LEVEL Bilateral 09/23/2020    Procedure: Radiofrequency Ablation, Nerve, Spinal, Lumbar, Medial Branch L4 and L5;  Surgeon: Kedar Amado MD;  Location: [facility];  Service: Pain Management;  Laterality: Bilateral;    RADIOFREQUENCY THERMAL COAGULATION OF MEDIAL BRANCH OF POSTERIOR RAMUS OF CERVICAL SPINAL NERVE Right 02/06/2020    Procedure: RADIOFREQUENCY THERMAL COAGULATION, NERVE, SPINAL, CERVICAL, POSTERIOR RAMUS, MEDIAL BRANCH C5, C6;  Surgeon: Kedar Amado MD;  Location: [facility] OR;  Service: Pain Management;  Laterality: Right;    RHIZOTOMY W/ RADIOFREQUENCY ABLATION  2016    lumbar    ROTATOR CUFF REPAIR      right    SURGICAL REMOVAL OF MASS OF UPPER EXTREMITY Left 06/20/2019    Procedure: EXCISION, MASS, UPPER EXTREMITY;  Surgeon: Max Mandel MD;  [facility];  Service: General;  Laterality: Left;    TRANSFORAMINAL EPIDURAL INJECTION OF STEROID Bilateral 7/11/2023    Procedure: Injection,steroid,epidural,transforaminal approach L5/S1;  Surgeon: Kedar Amado MD;  Location: [facility];  Service: Pain Management;  Laterality: Bilateral;    vocal cord surgery  1975       Social History     Socioeconomic History    Marital status:      Spouse name: Nuria    Number of children: 0   Tobacco Use    Smoking status: Never     Passive exposure: Current (one day weekly)    Smokeless tobacco: Never   Substance and Sexual Activity    Alcohol use: Yes     Comment: increased - 3 large drinks nightly    Drug use: Never    Sexual activity: Yes     Partners: Female     Birth control/protection: None     Social Determinants of Health     Financial Resource Strain: Low Risk  (1/1/2024)    Overall Financial Resource Strain (CARDIA)     Difficulty of Paying Living Expenses: Not hard at all   Food Insecurity: No Food Insecurity (1/1/2024)    Hunger  "Vital Sign     Worried About Running Out of Food in the Last Year: Never true     Ran Out of Food in the Last Year: Never true   Transportation Needs: No Transportation Needs (1/1/2024)    PRAPARE - Transportation     Lack of Transportation (Medical): No     Lack of Transportation (Non-Medical): No   Physical Activity: Sufficiently Active (1/1/2024)    Exercise Vital Sign     Days of Exercise per Week: 3 days     Minutes of Exercise per Session: 60 min   Stress: No Stress Concern Present (1/1/2024)    [employer] [place] [employer] - Occupational Stress Questionnaire     Feeling of Stress : Only a little   Social Connections: Unknown (1/1/2024)    Social Connection and Isolation Panel [NHANES]     Frequency of Communication with Friends and Family: Twice a week     Frequency of Social Gatherings with Friends and Family: Once a week     Active Member of Clubs or Organizations: Yes     Attends Club or Organization Meetings: More than 4 times per year     Marital Status:    Housing Stability: Low Risk  (1/1/2024)    Housing Stability Vital Sign     Unable to Pay for Housing in the Last Year: No     Number of Places Lived in the Last Year: 1     Unstable Housing in the Last Year: No         Medications/Allergies: See med card    Vitals:    01/16/24 1300   BP: 137/65   Pulse: 62   Weight: (!) 143.3 kg (315 lb 14.7 oz)   Height: 5' 11" (1.803 m)   PainSc:   2   PainLoc: Hip           Physical exam:  Gen: A and O x3, pleasant, well-groomed  Skin: No rashes or obvious lesions  HEENT: PERRLA, no obvious deformities on ears or in canals.Trachea midline.  CVS: Regular rate and rhythm, normal palpable pulses.  Resp: Clear to auscultation bilaterally, no wheezes or rales.  Abdomen: Soft, NT/ND.  Musculoskeletal:  No antalgic gait.     Incisions are clean, dry and intact, Steri-Strips in place.  He does have skin irritation where the bandages were.  Small hematoma noted superior to the IPG, slightly " tender.    Imagin19 MRI C-spine:  C2-C3: Small central disc osteophyte complex.  Minimal left facet hypertrophy.  No significant spinal canal or foraminal stenosis.  C3-C4: Mild disc osteophyte complex with possible tiny central soft disc extrusion.  Moderate right and minimal left facet hypertrophy.  Slight ventral cord flattening with thin sliver preserved ventral and preserved dorsal CSF.  Moderate-severe right and mild-moderate left foraminal stenosis.  C4-C5: Mild disc osteophyte complex with prominent central component likely small central disc extrusion..  Mild-moderate bilateral facet hypertrophy.  Ligamentum flavum thickening.  Mild ventral cord flattening with thin sliver preserved ventral and preserved dorsal CSF.  Mild-moderate bilateral foraminal stenosis.  C5-C6: Moderate disc osteophyte complex with likely small central disc extrusion.  Mild bilateral facet hypertrophy.  Ligamentum flavum thickening.  Moderate cord flattening with effacement of ventral and thin sliver preserved dorsal CSF.  Moderate bilateral foraminal stenosis.  C6-C7: Mild disc osteophyte complex with likely small central disc extrusion.  Preserved ventral and dorsal CSF.  Moderate bilateral foraminal stenosis.  C7-T1: Minimal disc osteophyte complex.  Mild left facet hypertrophy.  No significant spinal canal or foraminal stenosis.    4/15/19 EMG lower ext:   1. Subacute on chronic, moderate to severe, right lumbosacral polyradiculopathy most prominent at the L5 nerve root, with slight active denervation in the L4, L5, and S1 territories.  2. Subacute on chronic, moderate to severe, left lumbosacral polyradiculopathy most prominent of the L5-S1 level, with slight active denervation noted at the L4 level.  3. The symmetric absence of the sural sensory response can be seen in this age group.  The absence of the superficial peroneal sensory response is likely secondary to a more proximal dorsal root ganglion in the setting of  L4/5 radiculopathy.There is no convincing evidence of a peripheral neuropathy, focal neuropathy or plexopathy on this study.  In addition, no myopathic changes were noted.    4/2/19 MRI L-spine:  T12-L1: Mild disc bulge.  Minimal bilateral facet hypertrophy.  Mild narrowing of the right greater than left lateral recess.  No significant overall spinal canal stenosis.  Mild right and minimal left foraminal stenosis.    L1-L2: Mild retrolisthesis.  Mild disc bulge and posterior osteophytic ridging.  Likely small superimposed central/right lateral recess protrusion.  Mild-moderate right facet hypertrophy.  Left facets obscured by artifact.  Mild-moderate narrowing of the bilateral lateral recesses.  Mild overall spinal canal stenosis.  Mild-moderate right and mild left foraminal stenosis.   L2-L3: Left hemilaminectomy.  Mild retrolisthesis.  Spinal canal is patent.  Moderate left and mild-moderate right foraminal stenosis.   L3-L4: Fused.  Mild disc bulge.  Partially obscured bilateral facet hypertrophy, likely moderate on the right greater than left.  Minimal narrowing of bilateral lateral recesses.  No significant overall spinal canal stenosis.  Mild right and minimal left foraminal stenosis.   L4-L5: Fused.  Laminectomy.  Spinal canal is widely patent.  Mild-moderate bilateral foraminal stenosis.   BONES: L2-5 posterior fusion with bilateral pedicle screws and posterior rods with L4 laminectomy and crosslink device at the L4 level.  Left L2-3 hemilaminectomy.  Type 1 endplate changes at L4-5.  No aggressive bone marrow signal.   PARASPINAL AREA: STIR signal hyperintensity in the bilateral dorsal paraspinal muscles from the L3-L5 levels which may represent edema or denervation change.  Bilateral dorsal paraspinal muscular atrophy.  No large fluid collection.    06/19/2023 MRI lumbar spine   There is ferromagnetic artifact related to posterior spinal fusion hardware extending from L2-L5.  There is 5 mm retrolisthesis  of L2 on L3 and 4 mm retrolisthesis of L1 on L2.  There is 2 mm retrolisthesis of T12 on L1.  Levoscoliotic curvature centered at L2 and appears similar compared to CT from 04/02/2019.     Paraspinal soft tissues appear normal.  Conus terminates at the L2 vertebral body.  Conus medullaris appears normal.  Normal appearance of the cauda equina.     T12-L1: 3 mm broad-based disc bulge with mild hypertrophic facet changes and thickening of the ligamentum flavum.  Mild central canal narrowing.  Mild-to-moderate right and minimal left foraminal narrowing.     L1-L2: Grade 1 retrolisthesis of L1 on L2.  Small posterior disc osteophyte complex is present.  Hypertrophic facet changes are obscured secondary to ferromagnetic artifact.  There is mild central canal narrowing, worst at the right lateral recess.  Severe right foraminal narrowing.  Moderate left foraminal narrowing.     L2-L3: Grade 1 retrolisthesis of L2 on L3.  Tiny posterior disc osteophyte complex is present.  There is small endplate osteophytes.  Hypertrophic facet changes are obscured secondary to ferromagnetic artifact related to the spinal fusion hardware.  No central canal stenosis.  Moderate to severe bilateral neural foraminal narrowing is present.     L3-L4: No central canal or foraminal narrowing.     L4-L5: Asymmetric posterior disc osteophyte complex on the left is present.  Hypertrophic facet changes are obscured secondary to ferromagnetic artifact related to the posterior spinal fusion hardware.  Minimal residual disc bulge.  There is mild narrowing of the left lateral recess.  Moderate to severe neural foraminal narrowing, worse on the left.     L5-S1: Minimal disc bulge with severe hypertrophic facet changes and thickening of the ligamentum flavum.  Mild central canal narrowing.  Moderate to severe right and moderate left neural foraminal narrowing.    Assessment:   The patient is a 68-year-old man with a history of hypertension, CAD, loop  recorder, venous insufficiency who presents in referral from Dr. Crawford for neck pain.    1. Chronic pain syndrome        2. Lumbar radiculitis              Plan:  1. He had removed the mepore bandages this morning prior to his visit.  Steri-Strips are in place, no signs of infection.  We discussed that he does have a skin reaction to the mepore bandages.  He also has a small hematoma superior to the IPG site.  He denies fever or chills.  He can start showering but not scrub the incision sites.  Do not submerge in water for 3 weeks.  Overall he feels that he is 90% improved with his [employer] spinal cord stimulator.  He will follow-up in 1 month or sooner as needed.

## 2024-02-15 ENCOUNTER — TELEPHONE (OUTPATIENT)
Dept: PAIN MEDICINE | Facility: CLINIC | Age: 69
End: 2024-02-15
Payer: MEDICARE

## 2024-02-29 ENCOUNTER — OFFICE VISIT (OUTPATIENT)
Dept: PAIN MEDICINE | Facility: CLINIC | Age: 69
End: 2024-02-29
Payer: MEDICARE

## 2024-02-29 VITALS
SYSTOLIC BLOOD PRESSURE: 118 MMHG | DIASTOLIC BLOOD PRESSURE: 65 MMHG | HEIGHT: 71 IN | BODY MASS INDEX: 44.1 KG/M2 | HEART RATE: 57 BPM | WEIGHT: 315 LBS

## 2024-02-29 DIAGNOSIS — M47.816 LUMBAR SPONDYLOSIS: ICD-10-CM

## 2024-02-29 DIAGNOSIS — M54.16 LUMBAR RADICULOPATHY: ICD-10-CM

## 2024-02-29 DIAGNOSIS — G89.4 CHRONIC PAIN SYNDROME: ICD-10-CM

## 2024-02-29 DIAGNOSIS — M96.1 FAILED BACK SURGICAL SYNDROME: ICD-10-CM

## 2024-02-29 DIAGNOSIS — M54.16 LUMBAR RADICULITIS: Primary | ICD-10-CM

## 2024-02-29 DIAGNOSIS — M51.36 DDD (DEGENERATIVE DISC DISEASE), LUMBAR: ICD-10-CM

## 2024-02-29 PROCEDURE — 99999 PR PBB SHADOW E&M-EST. PATIENT-LVL III: CPT | Mod: PBBFAC,,,

## 2024-02-29 PROCEDURE — 1125F AMNT PAIN NOTED PAIN PRSNT: CPT | Mod: CPTII,S$GLB,,

## 2024-02-29 PROCEDURE — 99213 OFFICE O/P EST LOW 20 MIN: CPT | Mod: S$GLB,,,

## 2024-02-29 PROCEDURE — 4010F ACE/ARB THERAPY RXD/TAKEN: CPT | Mod: CPTII,S$GLB,,

## 2024-02-29 PROCEDURE — 3008F BODY MASS INDEX DOCD: CPT | Mod: CPTII,S$GLB,,

## 2024-02-29 PROCEDURE — 3074F SYST BP LT 130 MM HG: CPT | Mod: CPTII,S$GLB,,

## 2024-02-29 PROCEDURE — 3078F DIAST BP <80 MM HG: CPT | Mod: CPTII,S$GLB,,

## 2024-02-29 PROCEDURE — 1101F PT FALLS ASSESS-DOCD LE1/YR: CPT | Mod: CPTII,S$GLB,,

## 2024-02-29 PROCEDURE — 1159F MED LIST DOCD IN RCRD: CPT | Mod: CPTII,S$GLB,,

## 2024-02-29 PROCEDURE — 3288F FALL RISK ASSESSMENT DOCD: CPT | Mod: CPTII,S$GLB,,

## 2024-02-29 RX ORDER — METHYLPREDNISOLONE 4 MG/1
TABLET ORAL
Qty: 21 EACH | Refills: 0 | Status: SHIPPED | OUTPATIENT
Start: 2024-02-29 | End: 2024-03-11 | Stop reason: ALTCHOICE

## 2024-02-29 RX ORDER — METHOCARBAMOL 500 MG/1
500 TABLET, FILM COATED ORAL 4 TIMES DAILY
Qty: 40 TABLET | Refills: 1 | Status: SHIPPED | OUTPATIENT
Start: 2024-02-29 | End: 2024-03-10

## 2024-02-29 NOTE — PROGRESS NOTES
This note was completed with dictation software and grammatical errors may exist.    CC:Neck pain, back pain    HPI:  The patient is a 68-year-old man with a history of hypertension, CAD, loop recorder, venous insufficiency who presents in referral from Dr. Crawford for neck pain.   He is status post [employer] spinal cord stimulator implant on 01/10/2024 with initially 90% relief and now roughly 50% relief.   He reports continued relief however not as   Good as it was previously.  Feels like he is getting good relief of his lower back pain but continues to have some pain in his bilateral hips.  No problems with incision sites, no fever, chills.  He denies any new numbness, weakness or any new changes to his bowel or bladder function.      Previous history:  The patient is a 64-year-old man with a history of CAD on aspirin, obesity, lumbar stenosis status post surgery, gout who presents in referral from Dr. Crawford for right-sided neck pain. Patient denies any specific trauma, reports having no longstanding history of neck pain.  However about 18 months ago he began having discomfort in the right side of his neck.  It is worse with turning his head side to side especially with right lateral rotation.  It stays in the right neck and radiates up to the occiput but denies any radiation into the parietal region, no headaches with involved with this.  He denies any major radiation into the arms, no numbness or tingling, no weakness in his arms.  He denies any balance issues or dexterity issues.  He denies any major symptoms on the left side.  He describes the pain as aching and dull and sharp, worse with flexion and extension of the neck and lateral rotation, does get some relief with lying down.    Pain intervention history:  He has been doing physical therapy for his neck, has not had much benefit with this.  He does have a history of lumbar stenosis, had undergone epidural steroid injections and radiofrequency  ablation but eventually the pain worsened and he underwent surgery by Dr. Johnson.  He is status post L2-L5 decompression in 2016. He is status post right C5 and C6 medial branch block on 01/17/2020 with greater than 80% relief and then radiofrequency ablation procedure on 02/06/2020 with what he describes as 50% relief of his neck pain.   He is status post bilateral L4 and L5 medial branch radiofrequency ablation on 09/23/2020 with 0% relief.  He is status post bilateral L5/S1 RIGO on 07/11/2023 with initially 70% relief lasting roughly 4 weeks and now 0% relief. He is status post [facility] spinal cord stimulator implant on 01/10/2024 with initially 90% relief and now roughly 50% relief.     Spine surgeries:    Antineuropathics:  NSAIDs:  Physical therapy:  Antidepressants:  Muscle relaxers:  Opioids:  Antiplatelets/Anticoagulants:    ROS:  He reports easy bruising and back pain.  Balance of review of systems is negative.    Past Medical History:   Diagnosis Date    Amaurosis fugax of right eye 04/2014    resolved    Anticoagulant long-term use     Arm mass, left 06/20/2019    Bilateral chronic knee pain 01/10/2017    Bilateral venous insufficiency     legs    Calculus of gallbladder without mention of cholecystitis or obstruction     Cardiac arrhythmia     has implanted loop recorder, palpitations. Hx dizziness on bending over    Cellulitis and abscess of leg 04/2014    treated at wound care center, resolving    Cervical spondylosis 01/17/2020    Coronary artery disease     denies chest pain, denies MI, no stents; sees Dr Carlson    COVID-19 10/3/22 10/03/2022    Effusion, left knee 05/23/2017    Estrogen excess 08/2017    Generalized osteoarthrosis, involving multiple sites     Hyperlipidemia     Hypertension     Hypertension 10/07/2015    Hypothyroidism     Knee pain     right far worse than left    Low back pain 08/10/2015    Lymphedema 08/01/2021    Malaise and fatigue 05/23/2017    Morbidly obese  10/12/2022    Obesity, unspecified     Occlusion and stenosis of carotid artery without mention of cerebral infarction     Primary osteoarthritis of right knee 08/2017    Sleep apnea with use of continuous positive airway pressure (CPAP)     Status post placement of implantable loop recorder     Thyroid disease     Transient arterial occlusion of retina     Transient visual loss     BOTH EYE  PER DR CAMPOVERDE  7/27/2015       Past Surgical History:   Procedure Laterality Date    BACK SURGERY N/A     decompression of L2 to L5     CARDIAC SURGERY  2013?    insertion loop recorder, and then removed    CARDIAC SURGERY  2014    transesophageal echo; no clot seen, per patient    CARPAL TUNNEL RELEASE      bilateral    CAUDAL EPIDURAL STEROID INJECTION N/A 8/25/2023    Procedure: Injection-steroid-epidural-caudal;  Surgeon: Kedar Amado MD;  Location: [facility] OR;  Service: Pain Management;  Laterality: N/A;    CHOLECYSTECTOMY      COLONOSCOPY  09/26/2016    Dr. Ryley ANDRES Right     HIATAL HERNIA REPAIR  2011    INJECTION OF ANESTHETIC AGENT AROUND MEDIAL BRANCH NERVES INNERVATING CERVICAL FACET JOINT Right 01/17/2020    Procedure: Block-nerve-medial branch-cervical C5, C6;  Surgeon: Kedar Amado MD;  Location: [facility] OR;  Service: Pain Management;  Laterality: Right;    INJECTION OF ANESTHETIC AGENT AROUND MEDIAL BRANCH NERVES INNERVATING LUMBAR FACET JOINT Bilateral 09/11/2020    Procedure: BLOCK, NERVE, FACET JOINT, LUMBAR, MEDIAL BRANCH L4 and L5;  Surgeon: Kedar Amado MD;  Location: [facility] OR;  Service: Pain Management;  Laterality: Bilateral;    injection SI joint  7/201    INSERTION OF DORSAL COLUMN NERVE STIMULATOR FOR TRIAL N/A 12/4/2023    Procedure: INSERTION, NEUROSTIMULATOR, SPINAL CORD, DORSAL COLUMN, FOR TRIAL;  Surgeon: Kedar Amado MD;  Location: [facility] OR;  Service: Pain Management;  Laterality: N/A;    INSERTION OF NEUROSTIMULATOR OF DORSAL COLUMN OF SPINAL CORD N/A 1/10/2024     Procedure: INSERTION, NEUROSTIMULATOR, SPINAL CORD, DORSAL COLUMN;  Surgeon: Kedar Amado MD;  Location: [facility] OR;  Service: Pain Management;  Laterality: N/A;    JOINT REPLACEMENT      right knee   august 2017    KNEE ARTHROSCOPY Bilateral     right x 2; left x 1    LAPAROSCOPIC GASTRIC BANDING      LAPAROSCOPIC GASTRIC BANDING      LHC      LUMBAR EPIDURAL INJECTION      POLYPECTOMY      vocal cord    RADIOFREQUENCY ABLATION OF LUMBAR MEDIAL BRANCH NERVE AT SINGLE LEVEL Bilateral 09/23/2020    Procedure: Radiofrequency Ablation, Nerve, Spinal, Lumbar, Medial Branch L4 and L5;  Surgeon: Kedar Amado MD;  Location: [facility] OR;  Service: Pain Management;  Laterality: Bilateral;    RADIOFREQUENCY THERMAL COAGULATION OF MEDIAL BRANCH OF POSTERIOR RAMUS OF CERVICAL SPINAL NERVE Right 02/06/2020    Procedure: RADIOFREQUENCY THERMAL COAGULATION, NERVE, SPINAL, CERVICAL, POSTERIOR RAMUS, MEDIAL BRANCH C5, C6;  Surgeon: Kedar Amado MD;  Location: [facility] OR;  Service: Pain Management;  Laterality: Right;    RHIZOTOMY W/ RADIOFREQUENCY ABLATION  2016    lumbar    ROTATOR CUFF REPAIR      right    SURGICAL REMOVAL OF MASS OF UPPER EXTREMITY Left 06/20/2019    Procedure: EXCISION, MASS, UPPER EXTREMITY;  Surgeon: Max Mandel MD;  Location: [facility];  Service: General;  Laterality: Left;    TRANSFORAMINAL EPIDURAL INJECTION OF STEROID Bilateral 7/11/2023    Procedure: Injection,steroid,epidural,transforaminal approach L5/S1;  Surgeon: Kedar Amado MD;  Location: [facility];  Service: Pain Management;  Laterality: Bilateral;    vocal cord surgery  1975       Social History     Socioeconomic History    Marital status:      Spouse name: Nuria    Number of children: 0   Tobacco Use    Smoking status: Never     Passive exposure: Current (one day weekly)    Smokeless tobacco: Never   Substance and Sexual Activity    Alcohol use: Yes     Comment: increased - 3 large drinks nightly    Drug use: Never     "Sexual activity: Yes     Partners: Female     Birth control/protection: None     Social Determinants of Health     Financial Resource Strain: Low Risk  (1/1/2024)    Overall Financial Resource Strain (CARDIA)     Difficulty of Paying Living Expenses: Not hard at all   Food Insecurity: No Food Insecurity (1/1/2024)    Hunger Vital Sign     Worried About Running Out of Food in the Last Year: Never true     Ran Out of Food in the Last Year: Never true   Transportation Needs: No Transportation Needs (1/1/2024)    PRAPARE - Transportation     Lack of Transportation (Medical): No     Lack of Transportation (Non-Medical): No   Physical Activity: Sufficiently Active (1/1/2024)    Exercise Vital Sign     Days of Exercise per Week: 3 days     Minutes of Exercise per Session: 60 min   Stress: No Stress Concern Present (1/1/2024)    [employer] [place] [employer] - Occupational Stress Questionnaire     Feeling of Stress : Only a little   Social Connections: Unknown (1/1/2024)    Social Connection and Isolation Panel [NHANES]     Frequency of Communication with Friends and Family: Twice a week     Frequency of Social Gatherings with Friends and Family: Once a week     Active Member of Clubs or Organizations: Yes     Attends Club or Organization Meetings: More than 4 times per year     Marital Status:    Housing Stability: Low Risk  (1/1/2024)    Housing Stability Vital Sign     Unable to Pay for Housing in the Last Year: No     Number of Places Lived in the Last Year: 1     Unstable Housing in the Last Year: No         Medications/Allergies: See med card    Vitals:    02/29/24 1500   BP: 118/65   Pulse: (!) 57   Weight: (!) 147 kg (324 lb 1.2 oz)   Height: 5' 11" (1.803 m)   PainSc:   4   PainLoc: Back           Physical exam:  Gen: A and O x3, pleasant, well-groomed  Skin: No rashes or obvious lesions  HEENT: PERRLA, no obvious deformities on ears or in canals.Trachea midline.  CVS: Regular rate and rhythm, " normal palpable pulses.  Resp: Clear to auscultation bilaterally, no wheezes or rales.  Abdomen: Soft, NT/ND.  Musculoskeletal:  No antalgic gait.     Incisions   Well healed.  No signs of infection, erythema, drainage previous hematoma appears resolved.      Lower extremity strength:  5/5 bilaterally   Reflexes:  0+ bilateral patella and Achilles    Imagin19 MRI C-spine:  C2-C3: Small central disc osteophyte complex.  Minimal left facet hypertrophy.  No significant spinal canal or foraminal stenosis.  C3-C4: Mild disc osteophyte complex with possible tiny central soft disc extrusion.  Moderate right and minimal left facet hypertrophy.  Slight ventral cord flattening with thin sliver preserved ventral and preserved dorsal CSF.  Moderate-severe right and mild-moderate left foraminal stenosis.  C4-C5: Mild disc osteophyte complex with prominent central component likely small central disc extrusion..  Mild-moderate bilateral facet hypertrophy.  Ligamentum flavum thickening.  Mild ventral cord flattening with thin sliver preserved ventral and preserved dorsal CSF.  Mild-moderate bilateral foraminal stenosis.  C5-C6: Moderate disc osteophyte complex with likely small central disc extrusion.  Mild bilateral facet hypertrophy.  Ligamentum flavum thickening.  Moderate cord flattening with effacement of ventral and thin sliver preserved dorsal CSF.  Moderate bilateral foraminal stenosis.  C6-C7: Mild disc osteophyte complex with likely small central disc extrusion.  Preserved ventral and dorsal CSF.  Moderate bilateral foraminal stenosis.  C7-T1: Minimal disc osteophyte complex.  Mild left facet hypertrophy.  No significant spinal canal or foraminal stenosis.    4/15/19 EMG lower ext:   1. Subacute on chronic, moderate to severe, right lumbosacral polyradiculopathy most prominent at the L5 nerve root, with slight active denervation in the L4, L5, and S1 territories.  2. Subacute on chronic, moderate to severe, left  lumbosacral polyradiculopathy most prominent of the L5-S1 level, with slight active denervation noted at the L4 level.  3. The symmetric absence of the sural sensory response can be seen in this age group.  The absence of the superficial peroneal sensory response is likely secondary to a more proximal dorsal root ganglion in the setting of L4/5 radiculopathy.There is no convincing evidence of a peripheral neuropathy, focal neuropathy or plexopathy on this study.  In addition, no myopathic changes were noted.    4/2/19 MRI L-spine:  T12-L1: Mild disc bulge.  Minimal bilateral facet hypertrophy.  Mild narrowing of the right greater than left lateral recess.  No significant overall spinal canal stenosis.  Mild right and minimal left foraminal stenosis.    L1-L2: Mild retrolisthesis.  Mild disc bulge and posterior osteophytic ridging.  Likely small superimposed central/right lateral recess protrusion.  Mild-moderate right facet hypertrophy.  Left facets obscured by artifact.  Mild-moderate narrowing of the bilateral lateral recesses.  Mild overall spinal canal stenosis.  Mild-moderate right and mild left foraminal stenosis.   L2-L3: Left hemilaminectomy.  Mild retrolisthesis.  Spinal canal is patent.  Moderate left and mild-moderate right foraminal stenosis.   L3-L4: Fused.  Mild disc bulge.  Partially obscured bilateral facet hypertrophy, likely moderate on the right greater than left.  Minimal narrowing of bilateral lateral recesses.  No significant overall spinal canal stenosis.  Mild right and minimal left foraminal stenosis.   L4-L5: Fused.  Laminectomy.  Spinal canal is widely patent.  Mild-moderate bilateral foraminal stenosis.   BONES: L2-5 posterior fusion with bilateral pedicle screws and posterior rods with L4 laminectomy and crosslink device at the L4 level.  Left L2-3 hemilaminectomy.  Type 1 endplate changes at L4-5.  No aggressive bone marrow signal.   PARASPINAL AREA: STIR signal hyperintensity in the  bilateral dorsal paraspinal muscles from the L3-L5 levels which may represent edema or denervation change.  Bilateral dorsal paraspinal muscular atrophy.  No large fluid collection.    06/19/2023 MRI lumbar spine   There is ferromagnetic artifact related to posterior spinal fusion hardware extending from L2-L5.  There is 5 mm retrolisthesis of L2 on L3 and 4 mm retrolisthesis of L1 on L2.  There is 2 mm retrolisthesis of T12 on L1.  Levoscoliotic curvature centered at L2 and appears similar compared to CT from 04/02/2019.     Paraspinal soft tissues appear normal.  Conus terminates at the L2 vertebral body.  Conus medullaris appears normal.  Normal appearance of the cauda equina.     T12-L1: 3 mm broad-based disc bulge with mild hypertrophic facet changes and thickening of the ligamentum flavum.  Mild central canal narrowing.  Mild-to-moderate right and minimal left foraminal narrowing.     L1-L2: Grade 1 retrolisthesis of L1 on L2.  Small posterior disc osteophyte complex is present.  Hypertrophic facet changes are obscured secondary to ferromagnetic artifact.  There is mild central canal narrowing, worst at the right lateral recess.  Severe right foraminal narrowing.  Moderate left foraminal narrowing.     L2-L3: Grade 1 retrolisthesis of L2 on L3.  Tiny posterior disc osteophyte complex is present.  There is small endplate osteophytes.  Hypertrophic facet changes are obscured secondary to ferromagnetic artifact related to the spinal fusion hardware.  No central canal stenosis.  Moderate to severe bilateral neural foraminal narrowing is present.     L3-L4: No central canal or foraminal narrowing.     L4-L5: Asymmetric posterior disc osteophyte complex on the left is present.  Hypertrophic facet changes are obscured secondary to ferromagnetic artifact related to the posterior spinal fusion hardware.  Minimal residual disc bulge.  There is mild narrowing of the left lateral recess.  Moderate to severe neural  foraminal narrowing, worse on the left.     L5-S1: Minimal disc bulge with severe hypertrophic facet changes and thickening of the ligamentum flavum.  Mild central canal narrowing.  Moderate to severe right and moderate left neural foraminal narrowing.    Assessment:   The patient is a 68-year-old man with a history of hypertension, CAD, loop recorder, venous insufficiency who presents in referral from Dr. Crawford for neck pain.    1. Lumbar radiculitis  methocarbamoL (ROBAXIN) 500 MG Tab      2. Chronic pain syndrome        3. Lumbar radiculopathy        4. DDD (degenerative disc disease), lumbar        5. Lumbar spondylosis        6. Failed back surgical syndrome                Plan:   Incision sites well healed.  He is still finding relief with the stimulator however had greater improvement initially.  At this time I would like to have him follow-up with the [employer] rep for potential reprogramming.  If we can not obtain good contact, can consider x-ray to rule out lead migration.

## 2024-03-11 PROBLEM — D69.6 THROMBOCYTOPENIA: Chronic | Status: RESOLVED | Noted: 2021-06-25 | Resolved: 2024-03-11

## 2024-05-02 NOTE — OP NOTE
PROCEDURE DATE: 9/23/2020    PROCEDURE:  Radiofrequency ablation of the bilateral L4 and L5 medial branch nerves on the bilateral-side utilizing fluoroscopy    DIAGNOSIS:  Lumbar spondylosis    Post op Diagnosis: Same    PHYSICIAN: Kedar Amado MD    MEDICATIONS INJECTED:  From a mixture of 4ml of 2% lidocaine and 40mg of methylprednisone, 1ml of this solution was injected at each level.    LOCAL ANESTHETIC USED: Lidocaine 1%, 4 ml given at each site.    SEDATION MEDICATIONS: 4mg versed, 50mcg fentanyl    ESTIMATED BLOOD LOSS:  none    COMPLICATIONS:  none    TECHNIQUE:  A time out was taken to identify patient and procedure side prior to starting the procedure. Laying in a prone position, the patient was prepped and draped in the usual sterile fashion using ChloraPrep and sterile towels.  The levels were determined under fluoroscopic guidance and then marked.  Local anesthetic was given by raising a wheal at the skin over each site and then infiltrated approximately 2cm deeper.  A 20-gauge  100 mm Octavio RF needle was introduced to the anatomic location of the right and then left L4 and L5 medial branch nerves.  Motor stimulation up to 2 Volts at each level confirmed no motor nerve involvement.  Impedance was less than 800 ohms at each level. The above noted medication was then injected slowly.  Ablation was performed per level utilizing Perry radiofrequency generator 80°C for 90 seconds. The patient tolerated the procedure well.     The patient was monitored after the procedure.  Patient was given post procedure and discharge instructions to follow at home.  The patient was discharged in a stable condition    Event Time In   In Facility 1250   In Pre-Procedure 1302   Physician Available    Anesthesia Available    Pre-Op: Bedside Procedure Start    Pre-Op: Bedside Procedure Stop    Pre-Procedure Complete 1323   Out of Pre-Procedure    Anesthesia Start    Anesthesia Start Data Collection    Setup Start     Setup Complete    In Room 1406   Prep Start    Procedure Prep Complete    Procedure Start 1416   Procedure Closing    Emergence    Procedure Finish 1431   Sedation Start    Scope In    Extent Reached    Scope Out    Sedation End 1434   Out of Room 1434   Cleanup Start    Cleanup Complete    Cosmetic Start    Cosmetic Stop    Pain Mgmt In Room    Pain Mgmt Out Room    In Recovery    Anesthesia Finish    Bedside Procedure Start    Bedside Procedure Stop    Recovery Care Complete    Out of Recovery    To Phase II    In Phase II    Pain Mgmt Recovery Start 1436   Pain Mgmt Recovery Stop 1453   Obs Rec Start    Obs Rec Stop    Phase II Care Complete    Out of Phase II    Procedural Care Complete    Discharge    Pain Follow Up Needed    Pain Follow Up Complete      Moderate sedation was achieved with midazolam 4mg and fentanyl 50mcg.  Continuous monitoring of EKG, blood pressure and pulse oximetry was provided by a registered nurse during the entire course of the procedure under my supervision and recorded in the patient's medical record.   Total time for sedation was 18 minutes.     None

## 2024-05-09 PROBLEM — E66.01 CLASS 3 SEVERE OBESITY WITH BODY MASS INDEX (BMI) OF 40.0 TO 44.9 IN ADULT: Chronic | Status: RESOLVED | Noted: 2021-10-29 | Resolved: 2024-05-09

## 2024-05-09 PROBLEM — M1A.9XX0 CHRONIC GOUT WITHOUT TOPHUS: Status: ACTIVE | Noted: 2024-05-09

## 2024-05-09 PROBLEM — E66.813 CLASS 3 SEVERE OBESITY WITH BODY MASS INDEX (BMI) OF 40.0 TO 44.9 IN ADULT: Chronic | Status: RESOLVED | Noted: 2021-10-29 | Resolved: 2024-05-09

## 2024-07-15 NOTE — PROGRESS NOTES
"Subjective:    Patient ID:  Arthur Moreno is a 69 y.o. male who presents for follow-up of Follow-up      Problem List Items Addressed This Visit          Cardiac/Vascular    Carotid artery disease without cerebral infarction - Primary (Chronic)    Coronary artery disease involving native coronary artery of native heart without angina pectoris (Chronic)    Essential hypertension (Chronic)    Mixed hyperlipidemia (Chronic)    Venous insufficiency of both lower extremities (Chronic)       HPI    Patient was last seen on 07/06/2023 at which time he was doing okay from a cardiac standpoint.    On assessment today, the patient states that he feels OK.    No chest pain.  Uses CPAP religiously  Frequency of torsemide use - daily, not needing extra   Minimizing alcohol - Hoping to       Objective:     Vitals:    07/18/24 0938   BP: (!) 147/75   BP Location: Right arm   Patient Position: Sitting   BP Method: Medium (Automatic)   Pulse: 62   Weight: (!) 150.2 kg (331 lb 2.1 oz)   Height: 5' 11" (1.803 m)       BP Readings from Last 5 Encounters:   07/18/24 (!) 147/75   05/09/24 112/80   03/11/24 132/80   02/29/24 118/65   01/16/24 137/65        Physical Exam  Constitutional:       Appearance: He is well-developed.   HENT:      Head: Normocephalic and atraumatic.   Neck:      Vascular: No JVD.   Cardiovascular:      Rate and Rhythm: Normal rate and regular rhythm.      Heart sounds: Normal heart sounds. No murmur heard.     No friction rub. No gallop.   Pulmonary:      Effort: Pulmonary effort is normal. No respiratory distress.      Breath sounds: Normal breath sounds. No wheezing or rales.   Abdominal:      General: Bowel sounds are normal.      Palpations: Abdomen is soft.      Tenderness: There is no abdominal tenderness. There is no guarding or rebound.   Musculoskeletal:      Cervical back: Normal range of motion and neck supple.   Skin:     General: Skin is warm and dry.   Neurological:      Mental Status: He is alert " and oriented to person, place, and time.   Psychiatric:         Behavior: Behavior normal.             Current Outpatient Medications   Medication Instructions    allopurinoL (ZYLOPRIM) 300 mg, Oral, Daily    ALPRAZolam (XANAX) 0.25 MG tablet TAKE 1 TABLET (0.25 MG TOTAL) BY MOUTH 3 TIMES DAILY AS NEEDED FOR ANXIETY.    aspirin (ECOTRIN) 81 mg, Oral, Daily, Patient held since 6/12/16 per md instructions    atenoloL (TENORMIN) 50 mg, Oral, Daily    atorvastatin (LIPITOR) 40 mg, Oral, Nightly    b complex vitamins capsule 1 capsule, Oral, Daily    co-enzyme Q-10 50 mg, Oral, Daily    colchicine-probenecid 0.5-500 mg (CO-BENEMID) 500-0.5 mg Tab 1 tablet, Oral, Daily    folic acid (FOLVITE) 1 mg, Oral, Daily    fosinopriL (MONOPRIL) 20 mg, Oral, Daily    inulin (FIBER GUMMIES ORAL) Oral    Lactobacillus rhamnosus GG (CULTURELLE) 10 billion cell capsule 1 capsule, Oral, Daily    levothyroxine (SYNTHROID) 75 mcg, Oral, Before breakfast    multivitamin (THERAGRAN) per tablet 1 tablet, Oral, Daily    pantoprazole (PROTONIX) 40 mg, Oral, Daily    potassium chloride SA (K-DUR,KLOR-CON M) 10 MEQ tablet 20 mEq, Oral, Daily    torsemide (DEMADEX) 20 mg, Oral, Every morning       Lipid Panel:   Lab Results   Component Value Date    CHOL 154 05/14/2024    HDL 64 05/14/2024    LDLCALC 66.6 05/14/2024    TRIG 117 05/14/2024    CHOLHDL 41.6 05/14/2024       The 10-year ASCVD risk score (Pastor DK, et al., 2019) is: 20.7%    Values used to calculate the score:      Age: 69 years      Sex: Male      Is Non- : No      Diabetic: No      Tobacco smoker: No      Systolic Blood Pressure: 147 mmHg      Is BP treated: Yes      HDL Cholesterol: 64 mg/dL      Total Cholesterol: 154 mg/dL    Most Recent EKG Results  Results for orders placed or performed during the hospital encounter of 02/14/23   EKG 12-lead    Collection Time: 02/14/23  1:50 PM    Narrative    Test Reason : R00.0,    Vent. Rate : 117 BPM     Atrial Rate  : 117 BPM     P-R Int : 148 ms          QRS Dur : 086 ms      QT Int : 326 ms       P-R-T Axes : 057 040 053 degrees     QTc Int : 454 ms    Sinus tachycardia  Nonspecific ST abnormality  Abnormal ECG  When compared with ECG of 10-HERNANDEZ-2022 14:32,  Previous ECG has undetermined rhythm, needs review  Confirmed by Kin Asencio MD ([id number]) on 2/15/2023 11:41:35 AM    Referred By: [email or web address]   SELF           Confirmed By:Kin Asencio MD       Most Recent Echocardiogram Results  Results for orders placed during the hospital encounter of 02/14/23    Echo    Interpretation Summary  · Normal systolic function.  · The estimated ejection fraction is 60%.  · Normal left ventricular diastolic function.  · Normal right ventricular size with normal right ventricular systolic function.  · Mild tricuspid regurgitation.  · Normal central venous pressure (3 mmHg).  · The estimated PA systolic pressure is 19 mmHg.      Most Recent Nuclear Stress Test Results  Results for orders placed during the hospital encounter of 01/10/22    Nuclear Stress - Cardiology Interpreted    Interpretation Summary    Normal myocardial perfusion scan. There is no evidence of myocardial ischemia or infarction.    There is trivial to mild apical thinning which is a normal variant.    The gated perfusion images showed an ejection fraction of 58% at rest.    There is normal wall motion at rest.    The EKG portion of this study is negative for ischemia.    There are no prior studies for comparison.      Most Recent Cardiac PET Stress Test Results  No results found for this or any previous visit.      Most Recent Cardiovascular Angiogram results  No results found for this or any previous visit.      Other Most Recent Cardiology Results  Results for orders placed during the hospital encounter of 02/14/23    CARDIAC MONITORING STRIPS        All pertinent data including labs, imaging, EKGs, and studies listed above were reviewed.  Patient's most recent EKG tracing was  personally interpreted by this provider.    Assessment:       1. Carotid artery disease without cerebral infarction    2. Coronary artery disease involving native coronary artery of native heart without angina pectoris    3. Essential hypertension    4. Mixed hyperlipidemia    5. Venous insufficiency of both lower extremities         Plan for treatment of the above diagnoses:     Symptoms OK today  BP elevated today, past BP OK  Most recent echocardiogram reviewed personally     Continue aspirin 81 mg PO Daily  Continue atenolol 50 mg PO Daily  Continue atorvastatin 40 mg PO Daily   Continue coenzyme Q10   Continue fosinopril 20 mg PO Daily  Continue torsemide 20 mg PO Daily with extra doses as needed    Continue other cardiac medications  Mediterranean Diet/Cardiovascular Exercise Program    Patient queried and all questions were answered.    F/u in 1 year to reassess      Signed:    Cameron Carlson MD  7/18/2024 8:52 AM

## 2024-07-18 ENCOUNTER — OFFICE VISIT (OUTPATIENT)
Dept: CARDIOLOGY | Facility: CLINIC | Age: 69
End: 2024-07-18
Payer: MEDICARE

## 2024-07-18 VITALS
WEIGHT: 315 LBS | HEIGHT: 71 IN | DIASTOLIC BLOOD PRESSURE: 75 MMHG | SYSTOLIC BLOOD PRESSURE: 147 MMHG | HEART RATE: 62 BPM | BODY MASS INDEX: 44.1 KG/M2

## 2024-07-18 DIAGNOSIS — I25.10 CORONARY ARTERY DISEASE INVOLVING NATIVE CORONARY ARTERY OF NATIVE HEART WITHOUT ANGINA PECTORIS: Chronic | ICD-10-CM

## 2024-07-18 DIAGNOSIS — I10 ESSENTIAL HYPERTENSION: Chronic | ICD-10-CM

## 2024-07-18 DIAGNOSIS — E78.2 MIXED HYPERLIPIDEMIA: Chronic | ICD-10-CM

## 2024-07-18 DIAGNOSIS — I77.9 CAROTID ARTERY DISEASE WITHOUT CEREBRAL INFARCTION: Primary | Chronic | ICD-10-CM

## 2024-07-18 DIAGNOSIS — I87.2 VENOUS INSUFFICIENCY OF BOTH LOWER EXTREMITIES: Chronic | ICD-10-CM

## 2024-07-18 PROCEDURE — 99999 PR PBB SHADOW E&M-EST. PATIENT-LVL IV: CPT | Mod: PBBFAC,,, | Performed by: INTERNAL MEDICINE

## 2024-07-18 PROCEDURE — 3077F SYST BP >= 140 MM HG: CPT | Mod: CPTII,S$GLB,, | Performed by: INTERNAL MEDICINE

## 2024-07-18 PROCEDURE — 3044F HG A1C LEVEL LT 7.0%: CPT | Mod: CPTII,S$GLB,, | Performed by: INTERNAL MEDICINE

## 2024-07-18 PROCEDURE — 99214 OFFICE O/P EST MOD 30 MIN: CPT | Mod: S$GLB,,, | Performed by: INTERNAL MEDICINE

## 2024-07-18 PROCEDURE — 1159F MED LIST DOCD IN RCRD: CPT | Mod: CPTII,S$GLB,, | Performed by: INTERNAL MEDICINE

## 2024-07-18 PROCEDURE — 3288F FALL RISK ASSESSMENT DOCD: CPT | Mod: CPTII,S$GLB,, | Performed by: INTERNAL MEDICINE

## 2024-07-18 PROCEDURE — 1126F AMNT PAIN NOTED NONE PRSNT: CPT | Mod: CPTII,S$GLB,, | Performed by: INTERNAL MEDICINE

## 2024-07-18 PROCEDURE — 3008F BODY MASS INDEX DOCD: CPT | Mod: CPTII,S$GLB,, | Performed by: INTERNAL MEDICINE

## 2024-07-18 PROCEDURE — 1160F RVW MEDS BY RX/DR IN RCRD: CPT | Mod: CPTII,S$GLB,, | Performed by: INTERNAL MEDICINE

## 2024-07-18 PROCEDURE — 4010F ACE/ARB THERAPY RXD/TAKEN: CPT | Mod: CPTII,S$GLB,, | Performed by: INTERNAL MEDICINE

## 2024-07-18 PROCEDURE — 3078F DIAST BP <80 MM HG: CPT | Mod: CPTII,S$GLB,, | Performed by: INTERNAL MEDICINE

## 2024-07-18 PROCEDURE — 1101F PT FALLS ASSESS-DOCD LE1/YR: CPT | Mod: CPTII,S$GLB,, | Performed by: INTERNAL MEDICINE

## 2024-10-18 ENCOUNTER — OFFICE VISIT (OUTPATIENT)
Dept: UROLOGY | Facility: CLINIC | Age: 69
End: 2024-10-18
Payer: MEDICARE

## 2024-10-18 VITALS — BODY MASS INDEX: 44.1 KG/M2 | WEIGHT: 315 LBS | HEIGHT: 71 IN

## 2024-10-18 DIAGNOSIS — N13.8 ENLARGED PROSTATE WITH URINARY OBSTRUCTION: Primary | ICD-10-CM

## 2024-10-18 DIAGNOSIS — N40.1 ENLARGED PROSTATE WITH URINARY OBSTRUCTION: Primary | ICD-10-CM

## 2024-10-18 DIAGNOSIS — Z80.42 FAMILY HISTORY OF PROSTATE CANCER: ICD-10-CM

## 2024-10-18 PROCEDURE — 99999 PR PBB SHADOW E&M-EST. PATIENT-LVL III: CPT | Mod: PBBFAC,,, | Performed by: UROLOGY

## 2024-10-18 NOTE — PROGRESS NOTES
Subjective:       Patient ID: Arthur Moreno is a 69 y.o. male.    Chief Complaint: family hx of prostate ca    HPI    69-year-old with a family history of prostate cancer.  His brother passed last month from advanced prostate cancer.  No other family members with prostate cancer.  He is here for evaluation.  His most recent PSA is 0.34 and has been low for years.  He has mild obstructive urinary symptoms including urgency and weak flow.  He is not very bothered by his symptoms.  He takes no BPH medications.  He denies hematuria and dysuria.  We discussed prostate cancer screening.       Prostate Specific Antigen   Latest Ref Rng 0.00 - 4.00 ng/mL   5/26/2017 0.36    6/9/2018 0.65    11/25/2019 1.1    12/7/2020 0.47    12/10/2021 0.29    12/12/2022 0.50    8/19/2024 0.34        Past Medical History:   Diagnosis Date    Amaurosis fugax of right eye 04/2014    resolved    Anticoagulant long-term use     Arm mass, left 06/20/2019    Bilateral chronic knee pain 01/10/2017    Bilateral venous insufficiency     legs    Calculus of gallbladder without mention of cholecystitis or obstruction     Cardiac arrhythmia     has implanted loop recorder, palpitations. Hx dizziness on bending over    Cellulitis and abscess of leg 04/2014    treated at wound care center, resolving    Cervical spondylosis 01/17/2020    Coronary artery disease     denies chest pain, denies MI, no stents; sees Dr Carlson    COVID-19 10/3/22 10/03/2022    Effusion, left knee 05/23/2017    Estrogen excess 08/2017    Generalized osteoarthrosis, involving multiple sites     Hyperlipidemia     Hypertension     Hypertension 10/07/2015    Hypothyroidism     Knee pain     right far worse than left    Low back pain 08/10/2015    Lymphedema 08/01/2021    Malaise and fatigue 05/23/2017    Morbidly obese 10/12/2022    Obesity, unspecified     Occlusion and stenosis of carotid artery without mention of cerebral infarction     Primary osteoarthritis of right knee  08/2017    Sleep apnea with use of continuous positive airway pressure (CPAP)     Status post placement of implantable loop recorder     Thyroid disease     Transient arterial occlusion of retina     Transient visual loss     BOTH EYE  PER DR CAMPOVERDE  7/27/2015     Past Surgical History:   Procedure Laterality Date    BACK SURGERY N/A     decompression of L2 to L5     CARDIAC SURGERY  2013?    insertion loop recorder, and then removed    CARDIAC SURGERY  2014    transesophageal echo; no clot seen, per patient    CARPAL TUNNEL RELEASE      bilateral    CAUDAL EPIDURAL STEROID INJECTION N/A 08/25/2023    Procedure: Injection-steroid-epidural-caudal;  Surgeon: Kedar Amado MD;  Location: [facility] OR;  Service: Pain Management;  Laterality: N/A;    CHOLECYSTECTOMY      COLONOSCOPY  09/26/2016    Dr. Hedrick    COLONOSCOPY  01/18/2022    Dr Omkar Hedrick    EVLT Right     HIATAL HERNIA REPAIR  2011    INJECTION OF ANESTHETIC AGENT AROUND MEDIAL BRANCH NERVES INNERVATING CERVICAL FACET JOINT Right 01/17/2020    Procedure: Block-nerve-medial branch-cervical C5, C6;  Surgeon: Kedar Amado MD;  Location: [facility] OR;  Service: Pain Management;  Laterality: Right;    INJECTION OF ANESTHETIC AGENT AROUND MEDIAL BRANCH NERVES INNERVATING LUMBAR FACET JOINT Bilateral 09/11/2020    Procedure: BLOCK, NERVE, FACET JOINT, LUMBAR, MEDIAL BRANCH L4 and L5;  Surgeon: Kedar Amado MD;  Location: [facility] OR;  Service: Pain Management;  Laterality: Bilateral;    injection SI joint  7/201    INSERTION OF DORSAL COLUMN NERVE STIMULATOR FOR TRIAL N/A 12/04/2023    Procedure: INSERTION, NEUROSTIMULATOR, SPINAL CORD, DORSAL COLUMN, FOR TRIAL;  Surgeon: Kedar Amado MD;  Location: [facility] OR;  Service: Pain Management;  Laterality: N/A;    INSERTION OF NEUROSTIMULATOR OF DORSAL COLUMN OF SPINAL CORD N/A 01/10/2024    Procedure: INSERTION, NEUROSTIMULATOR, SPINAL CORD, DORSAL COLUMN;  Surgeon: Kedar Amado MD;  Location:  [facility] OR;  Service: Pain Management;  Laterality: N/A;    JOINT REPLACEMENT      right knee   august 2017    KNEE ARTHROSCOPY Bilateral     right x 2; left x 1    LAPAROSCOPIC GASTRIC BANDING      LAPAROSCOPIC GASTRIC BANDING      LHC      LUMBAR EPIDURAL INJECTION      POLYPECTOMY      vocal cord    RADIOFREQUENCY ABLATION OF LUMBAR MEDIAL BRANCH NERVE AT SINGLE LEVEL Bilateral 09/23/2020    Procedure: Radiofrequency Ablation, Nerve, Spinal, Lumbar, Medial Branch L4 and L5;  Surgeon: Kedar Amado MD;  Location: [facility] OR;  Service: Pain Management;  Laterality: Bilateral;    RADIOFREQUENCY THERMAL COAGULATION OF MEDIAL BRANCH OF POSTERIOR RAMUS OF CERVICAL SPINAL NERVE Right 02/06/2020    Procedure: RADIOFREQUENCY THERMAL COAGULATION, NERVE, SPINAL, CERVICAL, POSTERIOR RAMUS, MEDIAL BRANCH C5, C6;  Surgeon: Kedar Amado MD;  Location: [facility] OR;  Service: Pain Management;  Laterality: Right;    RHIZOTOMY W/ RADIOFREQUENCY ABLATION  2016    lumbar    ROTATOR CUFF REPAIR      right    SURGICAL REMOVAL OF MASS OF UPPER EXTREMITY Left 06/20/2019    Procedure: EXCISION, MASS, UPPER EXTREMITY;  Surgeon: Max Mandel MD;  Location: [facility];  Service: General;  Laterality: Left;    TRANSFORAMINAL EPIDURAL INJECTION OF STEROID Bilateral 07/11/2023    Procedure: Injection,steroid,epidural,transforaminal approach L5/S1;  Surgeon: Kedar Amado MD;  Location: [facility];  Service: Pain Management;  Laterality: Bilateral;    vocal cord surgery  1975       Current Outpatient Medications:     allopurinoL (ZYLOPRIM) 300 MG tablet, Take 1 tablet (300 mg total) by mouth once daily., Disp: 90 tablet, Rfl: 1    ALPRAZolam (XANAX) 0.25 MG tablet, TAKE 1 TABLET (0.25 MG TOTAL) BY MOUTH 3 TIMES DAILY AS NEEDED FOR ANXIETY., Disp: 90 tablet, Rfl: 0    aspirin (ECOTRIN) 81 MG EC tablet, Take 81 mg by mouth once daily. Patient held since 6/12/16 per md instructions, Disp: , Rfl:     atenoloL (TENORMIN) 50 MG tablet, Take 1  tablet (50 mg total) by mouth once daily., Disp: 90 tablet, Rfl: 1    atorvastatin (LIPITOR) 40 MG tablet, TAKE 1 TABLET (40 MG TOTAL) BY MOUTH NIGHTLY., Disp: 90 tablet, Rfl: 0    b complex vitamins capsule, Take 1 capsule by mouth once daily., Disp: , Rfl:     co-enzyme Q-10 50 mg capsule, Take 50 mg by mouth once daily., Disp: , Rfl:     colchicine-probenecid 0.5-500 mg (CO-BENEMID) 500-0.5 mg Tab, TAKE 1 TABLET BY MOUTH DAILY, Disp: 30 tablet, Rfl: 5    folic acid (FOLVITE) 1 MG tablet, Take 1 mg by mouth once daily., Disp: , Rfl:     fosinopriL (MONOPRIL) 20 MG tablet, Take 1 tablet (20 mg total) by mouth once daily., Disp: 90 tablet, Rfl: 3    inulin (FIBER GUMMIES ORAL), Take by mouth., Disp: , Rfl:     Lactobacillus rhamnosus GG (CULTURELLE) 10 billion cell capsule, Take 1 capsule by mouth once daily., Disp: , Rfl:     levothyroxine (SYNTHROID) 75 MCG tablet, Take 1 tablet (75 mcg total) by mouth before breakfast., Disp: 90 tablet, Rfl: 1    multivitamin (THERAGRAN) per tablet, Take 1 tablet by mouth once daily., Disp: , Rfl:     pantoprazole (PROTONIX) 40 MG tablet, Take 1 tablet (40 mg total) by mouth once daily., Disp: 90 tablet, Rfl: 1    potassium chloride (KLOR-CON) 10 MEQ TbSR, Take 2 tablets (20 mEq total) by mouth once daily., Disp: 60 tablet, Rfl: 5    torsemide (DEMADEX) 20 MG Tab, Take 1 tablet (20 mg total) by mouth every morning., Disp: 90 tablet, Rfl: 1  No current facility-administered medications for this visit.    Facility-Administered Medications Ordered in Other Visits:     lactated ringers infusion, , Intravenous, Continuous, Kedar Amado MD, Stopped at 07/11/23 1500    LIDOcaine (PF) 10 mg/ml (1%) injection 10 mg, 1 mL, Intradermal, Once, Kedar Amado MD      Review of Systems   Constitutional:  Negative for fever.   Genitourinary:  Negative for dysuria and hematuria.       Objective:      Physical Exam  Vitals reviewed.   Constitutional:       Appearance: He is  well-developed.   Pulmonary:      Effort: Pulmonary effort is normal.   Genitourinary:     Prostate: Enlarged (40g). Not tender and no nodules present.   Skin:     Findings: No rash.   Neurological:      Mental Status: He is alert and oriented to person, place, and time.         Assessment:       1. Enlarged prostate with urinary obstruction    2. Family history of prostate cancer        Plan:       Enlarged prostate with urinary obstruction    Family history of prostate cancer  -     Ambulatory referral/consult to Urology      Exam is benign.  PSA is very low.  I recommend he continue annual screening with his primary care doctor.  Follow-up p.r.n.

## 2024-11-13 ENCOUNTER — PATIENT MESSAGE (OUTPATIENT)
Dept: OTOLARYNGOLOGY | Facility: CLINIC | Age: 69
End: 2024-11-13
Payer: MEDICARE

## 2024-11-29 PROBLEM — L03.116 LEFT LEG CELLULITIS: Status: ACTIVE | Noted: 2024-11-29

## 2024-12-03 ENCOUNTER — OFFICE VISIT (OUTPATIENT)
Dept: OTOLARYNGOLOGY | Facility: CLINIC | Age: 69
End: 2024-12-03
Payer: MEDICARE

## 2024-12-03 VITALS — BODY MASS INDEX: 45.97 KG/M2 | WEIGHT: 315 LBS

## 2024-12-03 DIAGNOSIS — H61.23 BILATERAL IMPACTED CERUMEN: ICD-10-CM

## 2024-12-03 PROCEDURE — 69210 REMOVE IMPACTED EAR WAX UNI: CPT | Mod: S$GLB,,, | Performed by: NURSE PRACTITIONER

## 2024-12-03 PROCEDURE — 99499 UNLISTED E&M SERVICE: CPT | Mod: S$GLB,,, | Performed by: NURSE PRACTITIONER

## 2024-12-03 PROCEDURE — 99999 PR PBB SHADOW E&M-EST. PATIENT-LVL III: CPT | Mod: PBBFAC,,, | Performed by: NURSE PRACTITIONER

## 2024-12-03 NOTE — PROGRESS NOTES
Subjective:       Patient ID: Arthur Moreno is a 69 y.o. male.    Chief Complaint: Cerumen Impaction      HPI   Patient returns for ear cleaning. Seen last year with reports of hearing loss since youth, kept him out of the [facility] because he couldn't pass hearing test. Was told he had high-pitched hearing loss. 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 Denies tinnitus. (+)Family h/o HL. Trouble hearing if background noise is present.     Review of Systems   Constitutional: Negative.    HENT: Negative.     Eyes: Negative.    Respiratory: Negative.     Cardiovascular: Negative.    Gastrointestinal: Negative.    Musculoskeletal: Negative.    Integumentary:  Negative.   Neurological: Negative.    Hematological: Negative.    Psychiatric/Behavioral: Negative.           Objective:      Physical Exam  Vitals and nursing note reviewed.   Constitutional:       General: He is not in acute distress.     Appearance: He is well-developed. He is not ill-appearing.   HENT:      Head: Normocephalic and atraumatic.      Right Ear: Hearing, tympanic membrane, ear canal and external ear normal. No middle ear effusion. Tympanic membrane is not erythematous.      Left Ear: Hearing, tympanic membrane, ear canal and external ear normal.  No middle ear effusion. Tympanic membrane is not erythematous.      Nose: Nose normal.   Eyes:      General: Lids are normal. No scleral icterus.        Right eye: No discharge.         Left eye: No discharge.   Neck:      Trachea: Trachea normal. No tracheal deviation.   Cardiovascular:      Rate and Rhythm: Normal rate.   Pulmonary:      Effort: Pulmonary effort is normal. No respiratory distress.      Breath sounds: No stridor. No wheezing.   Musculoskeletal:         General: Normal range of motion.      Cervical back: Normal range of motion.   Skin:     General: Skin is warm and dry.   Neurological:      Mental Status: He is alert and oriented to person, place, and time.      Coordination:  Coordination is intact.      Gait: Gait normal.   Psychiatric:         Attention and Perception: Attention normal.         Mood and Affect: Mood normal.         Speech: Speech normal.         Behavior: Behavior normal. Behavior is cooperative.       SEPARATE PROCEDURE IN OFFICE:   Procedure: Removal of impacted cerumen, bilateral   Pre Procedure Diagnosis: Cerumen Impaction   Post Procedure Diagnosis: Cerumen Impaction   Verbal informed consent in regards to risk of trauma to ear canal, ear drum or hearing, discomfort during procedure and/or inability to remove cerumen impaction in one session or unforeseen events or complications.   No anesthesia.     Procedure in detail:   Ear canal visualized bilateral with appropriate size ear speculum utilizing Operating Head Binocular Otomicroscope   Utilizing the following:  Ring curet, delicate alligator forceps, and/or suction cannula was used. The impacted cerumen of the ear canals was removed atraumatically. The TM and EAC were then inspected and found to be clear of wax. See description of TMs/EACs in PE above.   Complications: No   Condition: Improved/Good     Assessment:       Problem List Items Addressed This Visit    None  Visit Diagnoses       Bilateral impacted cerumen                Plan:     Debrox prn      Patient encouraged to return to clinic if symptoms worsen/persist and as needed for further ENT symptoms or concerns.

## 2025-02-18 ENCOUNTER — OFFICE VISIT (OUTPATIENT)
Facility: CLINIC | Age: 70
End: 2025-02-18
Payer: MEDICARE

## 2025-02-18 DIAGNOSIS — L82.1 SEBORRHEIC KERATOSES: ICD-10-CM

## 2025-02-18 DIAGNOSIS — Z12.83 SKIN CANCER SCREENING: ICD-10-CM

## 2025-02-18 DIAGNOSIS — D48.5 NEOPLASM OF UNCERTAIN BEHAVIOR OF SKIN: Primary | ICD-10-CM

## 2025-02-18 NOTE — PROGRESS NOTES
Subjective:      Patient ID:  Arthur Moreno is a 69 y.o. male who presents for   Chief Complaint   Patient presents with    Follow-up     HPI  Established patient  Here for UBSC.   LOV 06/2023 with Dr. Gastelum  C/o persistent scaly areas bilateral thighs, months. Admits to picking them. No tx.     No personal history of skin cancer  No family history of skin cancer.  Previously with itching on back, resolved.     Review of Systems   Skin:  Negative for itching, rash, dry skin, dry lips and wears hat.       Objective:   Physical Exam   Constitutional: He appears well-developed and well-nourished. No distress.   Eyes: Lids are normal.  No conjunctival no injection.   Neurological: He is alert and oriented to person, place, and time. He is not disoriented.   Psychiatric: He has a normal mood and affect.   Skin:   Areas Examined (abnormalities noted in diagram):   Scalp / Hair Palpated and Inspected  Head / Face Inspection Performed  Neck Inspection Performed  Chest / Axilla Inspection Performed  Abdomen Inspection Performed  Genitals / Buttocks / Groin Inspection Performed  Back Inspection Performed  RUE Inspected  LUE Inspection Performed  Nails and Digits Inspection Performed                 Diagram Legend     Erythematous scaling macule/papule c/w actinic keratosis       Vascular papule c/w angioma      Pigmented verrucoid papule/plaque c/w seborrheic keratosis      Yellow umbilicated papule c/w sebaceous hyperplasia      Irregularly shaped tan macule c/w lentigo     1-2 mm smooth white papules consistent with Milia      Movable subcutaneous cyst with punctum c/w epidermal inclusion cyst      Subcutaneous movable cyst c/w pilar cyst      Firm pink to brown papule c/w dermatofibroma      Pedunculated fleshy papule(s) c/w skin tag(s)      Evenly pigmented macule c/w junctional nevus     Mildly variegated pigmented, slightly irregular-bordered macule c/w mildly atypical nevus      Flesh colored to evenly  pigmented papule c/w intradermal nevus       Pink pearly papule/plaque c/w basal cell carcinoma      Erythematous hyperkeratotic cursted plaque c/w SCC      Surgical scar with no sign of skin cancer recurrence      Open and closed comedones      Inflammatory papules and pustules      Verrucoid papule consistent consistent with wart     Erythematous eczematous patches and plaques     Dystrophic onycholytic nail with subungual debris c/w onychomycosis     Umbilicated papule    Erythematous-base heme-crusted tan verrucoid plaque consistent with inflamed seborrheic keratosis     Erythematous Silvery Scaling Plaque c/w Psoriasis     See annotation            Assessment / Plan:      Pathology Orders:       Normal Orders This Visit    Specimen to Pathology, Dermatology     Comments:    Number of Specimens:->2  ------------------------->-------------------------  Spec 1 Procedure:->Biopsy  Spec 1 Clinical Impression:->prurigo r/o malignancy  Spec 1 Source:->right thigh  ------------------------->-------------------------  Spec 2 Procedure:->Biopsy  Spec 2 Clinical Impression:->prurigo r/o malignancy  Spec 2 Source:->left thigh  Release to patient->Immediate  Send normal result to authorizing provider's In Basket if  patient is active on MyChart:->Yes    Questions:    Procedure Type: Dermatology and skin neoplasms    Number of Specimens: 2    ------------------------: -------------------------    Spec 1 Procedure: Biopsy    Spec 1 Clinical Impression: prurigo r/o malignancy    Spec 1 Source: right thigh    ------------------------: -------------------------    Spec 2 Procedure: Biopsy    Spec 2 Clinical Impression: prurigo r/o malignancy    Spec 2 Source: left thigh    Clinical Information: lichenified pink plaques    Release to patient: Immediate    Send normal result to authorizing provider's In Basket if patient is active on MyChart: Yes          Neoplasm of uncertain behavior of skin  -     Specimen to Pathology,  Dermatology  Shave biopsy procedure note:    Shave biopsy performed after verbal consent including risk of infection, scar, recurrence, need for additional treatment of site. Area prepped with alcohol, anesthetized with approximately 1.0cc of 1% lidocaine with epinephrine. Lesional tissue shaved with razor blade. Hemostasis achieved with application of aluminum chloride followed by hyfrecation. No complications. Dressing applied. Wound care explained.      Seborrheic keratoses  These are benign inherited growths without a malignant potential. Reassurance given to patient. No treatment is necessary.       Skin cancer screening      Total body skin examination performed today including at least 12 points as noted in physical examination. Suspicious lesions noted.            No follow-ups on file.

## 2025-02-25 ENCOUNTER — RESULTS FOLLOW-UP (OUTPATIENT)
Facility: CLINIC | Age: 70
End: 2025-02-25

## 2025-05-15 PROBLEM — Z96.82 S/P PLACEMENT OF NERVE STIMULATOR: Status: ACTIVE | Noted: 2025-05-15

## 2025-05-15 PROBLEM — Z80.42 FAMILY HISTORY OF PROSTATE CANCER: Status: ACTIVE | Noted: 2025-05-15

## 2025-06-18 PROBLEM — R74.8 ELEVATED SERUM GGT LEVEL: Status: ACTIVE | Noted: 2025-06-18

## 2025-08-05 ENCOUNTER — OFFICE VISIT (OUTPATIENT)
Dept: CARDIOLOGY | Facility: CLINIC | Age: 70
End: 2025-08-05
Payer: MEDICARE

## 2025-08-05 VITALS
SYSTOLIC BLOOD PRESSURE: 122 MMHG | WEIGHT: 315 LBS | DIASTOLIC BLOOD PRESSURE: 60 MMHG | HEIGHT: 71 IN | BODY MASS INDEX: 44.1 KG/M2 | HEART RATE: 54 BPM

## 2025-08-05 DIAGNOSIS — G47.33 OBSTRUCTIVE SLEEP APNEA SYNDROME: ICD-10-CM

## 2025-08-05 DIAGNOSIS — E66.01 MORBIDLY OBESE: ICD-10-CM

## 2025-08-05 DIAGNOSIS — I10 ESSENTIAL HYPERTENSION: Chronic | ICD-10-CM

## 2025-08-05 DIAGNOSIS — I25.10 CORONARY ARTERY DISEASE INVOLVING NATIVE CORONARY ARTERY OF NATIVE HEART WITHOUT ANGINA PECTORIS: Primary | Chronic | ICD-10-CM

## 2025-08-05 DIAGNOSIS — E78.2 MIXED HYPERLIPIDEMIA: Chronic | ICD-10-CM

## 2025-08-05 DIAGNOSIS — R73.03 PREDIABETES: Chronic | ICD-10-CM

## 2025-08-05 DIAGNOSIS — I77.9 CAROTID ARTERY DISEASE WITHOUT CEREBRAL INFARCTION: Chronic | ICD-10-CM

## 2025-08-05 DIAGNOSIS — I87.2 VENOUS INSUFFICIENCY OF BOTH LOWER EXTREMITIES: Chronic | ICD-10-CM

## 2025-08-05 PROCEDURE — 3044F HG A1C LEVEL LT 7.0%: CPT | Mod: CPTII,S$GLB,,

## 2025-08-05 PROCEDURE — 99214 OFFICE O/P EST MOD 30 MIN: CPT | Mod: S$GLB,,,

## 2025-08-05 PROCEDURE — 3078F DIAST BP <80 MM HG: CPT | Mod: CPTII,S$GLB,,

## 2025-08-05 PROCEDURE — 3008F BODY MASS INDEX DOCD: CPT | Mod: CPTII,S$GLB,,

## 2025-08-05 PROCEDURE — 1101F PT FALLS ASSESS-DOCD LE1/YR: CPT | Mod: CPTII,S$GLB,,

## 2025-08-05 PROCEDURE — 99999 PR PBB SHADOW E&M-EST. PATIENT-LVL IV: CPT | Mod: PBBFAC,,,

## 2025-08-05 PROCEDURE — 3288F FALL RISK ASSESSMENT DOCD: CPT | Mod: CPTII,S$GLB,,

## 2025-08-05 PROCEDURE — 1126F AMNT PAIN NOTED NONE PRSNT: CPT | Mod: CPTII,S$GLB,,

## 2025-08-05 PROCEDURE — 4010F ACE/ARB THERAPY RXD/TAKEN: CPT | Mod: CPTII,S$GLB,,

## 2025-08-05 PROCEDURE — 3074F SYST BP LT 130 MM HG: CPT | Mod: CPTII,S$GLB,,

## 2025-08-05 PROCEDURE — 1159F MED LIST DOCD IN RCRD: CPT | Mod: CPTII,S$GLB,,

## 2025-08-05 NOTE — PROGRESS NOTES
Subjective:    Patient ID:  Arthur Moreno is a 70 y.o. male patient here for evaluation Annual Exam    History of Present Illness:     Arthur Moreno is a 70 y.o. male who follows with Dr. Carlson here today for follow up. Last seen in clinic 7/2024. He denies CP, SOB/VALVERDE, palpitations, dizziness, fatigue, activity intolerance. Treadmill 3x/week and weights without issues. Reports good compliance with compression stockings and CPAP.     Focused Active Problem List includes:  CAD, no revasc history   TTE 2023: LVEF 60%  MPI 2022: Negative  Carotid disease, mild   Hypertension   Hyperlipidemia   Morbid obesity   Prediabetes   LIBERTAD on CPAP  Chronic venous insufficiency of BLE       Most Recent Echocardiogram Results  Results for orders placed during the hospital encounter of 02/14/23    Echo    Interpretation Summary  · Normal systolic function.  · The estimated ejection fraction is 60%.  · Normal left ventricular diastolic function.  · Normal right ventricular size with normal right ventricular systolic function.  · Mild tricuspid regurgitation.  · Normal central venous pressure (3 mmHg).  · The estimated PA systolic pressure is 19 mmHg.      Most Recent Nuclear Stress Test Results  Results for orders placed during the hospital encounter of 01/10/22    Nuclear Stress - Cardiology Interpreted    Interpretation Summary    Normal myocardial perfusion scan. There is no evidence of myocardial ischemia or infarction.    There is trivial to mild apical thinning which is a normal variant.    The gated perfusion images showed an ejection fraction of 58% at rest.    There is normal wall motion at rest.    The EKG portion of this study is negative for ischemia.    There are no prior studies for comparison.      Most Recent Cardiac PET Stress Test Results  No results found for this or any previous visit.      Most Recent Cardiovascular Angiogram results  No results found for this or any previous visit.      Other Most  Recent Cardiology Results  Results for orders placed during the hospital encounter of 02/14/23    CARDIAC MONITORING STRIPS      REVIEW OF SYSTEMS: As noted in HPI   CARDIOVASCULAR: No recent chest pain, palpitations, arm/neck/jaw pain, or edema.  RESPIRATORY: No recent fever, cough, SOB.  : No blood in the urine  GI: No reflux, nausea, vomiting, or blood in stool.   MUSCULOSKELETAL: No falls.   NEURO: No headaches, syncope, or dizziness.  EYES: No sudden changes in vision.     Past Medical History:   Diagnosis Date    Amaurosis fugax of right eye 04/2014    resolved    Anticoagulant long-term use     Arm mass, left 06/20/2019    Bilateral chronic knee pain 01/10/2017    Bilateral venous insufficiency     legs    Calculus of gallbladder without mention of cholecystitis or obstruction     Cardiac arrhythmia     has implanted loop recorder, palpitations. Hx dizziness on bending over    Cellulitis and abscess of leg 04/2014    treated at wound care center, resolving    Cervical spondylosis 01/17/2020    Chronic Bilateral Lower Extremity Edema     Coronary artery disease     denies chest pain, denies MI, no stents; sees Dr Carlson    COVID-19 10/3/22 10/03/2022    Effusion, left knee 05/23/2017    Estrogen excess 08/2017    Generalized osteoarthrosis, involving multiple sites     Hyperlipidemia     Hypertension     Hypertension 10/07/2015    Hypothyroidism     Knee pain     right far worse than left    Low back pain 08/10/2015    Lymphedema 08/01/2021    Malaise and fatigue 05/23/2017    Morbidly obese 10/12/2022    Obesity, unspecified     Occlusion and stenosis of carotid artery without mention of cerebral infarction     Primary osteoarthritis of right knee 08/2017    Sleep apnea with use of continuous positive airway pressure (CPAP)     Status post placement of implantable loop recorder     Thyroid disease     Transient arterial occlusion of retina     Transient visual loss     BOTH EYE  PER DR CAMPOVERDE   7/27/2015     Past Surgical History:   Procedure Laterality Date    BACK SURGERY N/A     decompression of L2 to L5     CARDIAC SURGERY  2013?    insertion loop recorder, and then removed    CARDIAC SURGERY  2014    transesophageal echo; no clot seen, per patient    CARPAL TUNNEL RELEASE      bilateral    CAUDAL EPIDURAL STEROID INJECTION N/A 08/25/2023    Procedure: Injection-steroid-epidural-caudal;  Surgeon: Kedar Amado MD;  Location: [facility] OR;  Service: Pain Management;  Laterality: N/A;    CHOLECYSTECTOMY      COLONOSCOPY  09/26/2016    Dr. Hedrick    COLONOSCOPY  01/18/2022    Dr Omkar ANDRES Right     HIATAL HERNIA REPAIR  2011    INJECTION OF ANESTHETIC AGENT AROUND MEDIAL BRANCH NERVES INNERVATING CERVICAL FACET JOINT Right 01/17/2020    Procedure: Block-nerve-medial branch-cervical C5, C6;  Surgeon: Kedar Amado MD;  Location: [facility] OR;  Service: Pain Management;  Laterality: Right;    INJECTION OF ANESTHETIC AGENT AROUND MEDIAL BRANCH NERVES INNERVATING LUMBAR FACET JOINT Bilateral 09/11/2020    Procedure: BLOCK, NERVE, FACET JOINT, LUMBAR, MEDIAL BRANCH L4 and L5;  Surgeon: Kedar Amado MD;  Location: [facility] OR;  Service: Pain Management;  Laterality: Bilateral;    injection SI joint  7/201    INSERTION OF DORSAL COLUMN NERVE STIMULATOR FOR TRIAL N/A 12/04/2023    Procedure: INSERTION, NEUROSTIMULATOR, SPINAL CORD, DORSAL COLUMN, FOR TRIAL;  Surgeon: Kedar Amado MD;  Location: [facility] OR;  Service: Pain Management;  Laterality: N/A;    INSERTION OF NEUROSTIMULATOR OF DORSAL COLUMN OF SPINAL CORD N/A 01/10/2024    Procedure: INSERTION, NEUROSTIMULATOR, SPINAL CORD, DORSAL COLUMN;  Surgeon: Kedar Amado MD;  Location: [facility] OR;  Service: Pain Management;  Laterality: N/A;    JOINT REPLACEMENT      right knee   august 2017    KNEE ARTHROSCOPY Bilateral     right x 2; left x 1    LAPAROSCOPIC GASTRIC BANDING      LAPAROSCOPIC GASTRIC BANDING      LHC      LUMBAR EPIDURAL  INJECTION      POLYPECTOMY      vocal cord    RADIOFREQUENCY ABLATION OF LUMBAR MEDIAL BRANCH NERVE AT SINGLE LEVEL Bilateral 09/23/2020    Procedure: Radiofrequency Ablation, Nerve, Spinal, Lumbar, Medial Branch L4 and L5;  Surgeon: Kedar Amado MD;  [place] [facility] [place];  Service: Pain Management;  Laterality: Bilateral;    RADIOFREQUENCY THERMAL COAGULATION OF MEDIAL BRANCH OF POSTERIOR RAMUS OF CERVICAL SPINAL NERVE Right 02/06/2020    Procedure: RADIOFREQUENCY THERMAL COAGULATION, NERVE, SPINAL, CERVICAL, POSTERIOR RAMUS, MEDIAL BRANCH C5, C6;  Surgeon: Kedar Amado MD;  Location: [facility] [place];  Service: Pain Management;  Laterality: Right;    RHIZOTOMY W/ RADIOFREQUENCY ABLATION  2016    lumbar    ROTATOR CUFF REPAIR      right    SURGICAL REMOVAL OF MASS OF UPPER EXTREMITY Left 06/20/2019    Procedure: EXCISION, MASS, UPPER EXTREMITY;  Surgeon: Max Mandel MD;  Location: [facility] OR;  Service: General;  Laterality: Left;    TRANSFORAMINAL EPIDURAL INJECTION OF STEROID Bilateral 07/11/2023    Procedure: Injection,steroid,epidural,transforaminal approach L5/S1;  Surgeon: Kedar Amado MD;  Location: [facility];  Service: Pain Management;  Laterality: Bilateral;    vocal cord surgery  1975     Social History[1]      Objective      Vitals:    08/05/25 1320   BP: 122/60   Pulse: (!) 54       The 10-year ASCVD risk score (Pastor ROBLES, et al., 2019) is: 23.9%    Values used to calculate the score:      Age: 70 years      Sex: Male      Is Non- : No      Diabetic: No      Tobacco smoker: No      Systolic Blood Pressure: 151 mmHg      Is BP treated: Yes      HDL Cholesterol: 64 mg/dL      Total Cholesterol: 162 mg/dL      LAST EKG  Results for orders placed or performed during the hospital encounter of 02/14/23   EKG 12-lead    Collection Time: 02/14/23  1:50 PM    Narrative    Test Reason : R00.0,    Vent. Rate : 117 BPM     Atrial Rate : 117 BPM     P-R Int : 148 ms          QRS Dur  : 086 ms      QT Int : 326 ms       P-R-T Axes : 057 040 053 degrees     QTc Int : 454 ms    Sinus tachycardia  Nonspecific ST abnormality  Abnormal ECG  When compared with ECG of 10-HERNANDEZ-2022 14:32,  Previous ECG has undetermined rhythm, needs review  Confirmed by Kin Asencio MD ([id number]) on 2/15/2023 11:41:35 AM    Referred By: [email or web address]   SELF           Confirmed By:Kin Asencio MD     LIPIDS - LAST 2   Lab Results   Component Value Date    CHOL 162 05/20/2025    CHOL 158 11/14/2024    HDL 64 05/20/2025    HDL 83 (H) 11/14/2024    LDLCALC 83.2 05/20/2025    LDLCALC 57.8 (L) 11/14/2024    TRIG 74 05/20/2025    TRIG 86 11/14/2024    CHOLHDL 39.5 05/20/2025    CHOLHDL 52.5 (H) 11/14/2024     CARDIAC PROFILE - LAST 2  Lab Results   Component Value Date     05/01/2023    TROPONINI <0.012 02/15/2023    TROPONINI <0.012 02/14/2023      CBC - LAST 2  Lab Results   Component Value Date    WBC 5.41 05/20/2025    WBC 6.77 11/14/2024    HGB 15.6 05/20/2025    HGB 17.2 11/14/2024    HCT 45.2 05/20/2025    HCT 49.6 11/14/2024    PLT 99 (L) 05/20/2025     (L) 11/14/2024     Lab Results   Component Value Date    LABPT 16.0 (H) 08/17/2017    LABPT 14.1 08/16/2017    INR 2.7 09/22/2017    INR 4.9 (A) 09/15/2017    APTT 30.3 06/13/2016     CHEMISTRY - LAST 2  Lab Results   Component Value Date     05/28/2025     05/20/2025    K 4.5 05/28/2025    K 4.4 05/20/2025    CO2 29 05/28/2025    CO2 28 05/20/2025    BUN 16 05/28/2025    BUN 18 05/20/2025    CREATININE 1.25 05/28/2025    CREATININE 1.49 (H) 05/20/2025     (H) 05/28/2025     05/20/2025    CALCIUM 9.0 05/28/2025    CALCIUM 8.8 05/20/2025    MG 2.0 02/19/2023    MG 2.1 02/18/2023    ALBUMIN 3.9 05/28/2025    ALBUMIN 3.8 05/20/2025    ALT 39 05/20/2025    ALT 44 01/14/2025    AST 55 (H) 05/20/2025    AST 57 (H) 01/14/2025      ENDOCRINE - LAST 2  Lab Results   Component Value Date    HGBA1C 5.2 05/20/2025    HGBA1C 5.4 11/14/2024    TSH 1.906  05/20/2025    TSH 1.430 11/14/2024        PHYSICAL EXAM  CONSTITUTIONAL: Well built, well nourished in no apparent distress  NECK: no carotid bruit, no JVD  LUNGS: CTA  CHEST WALL: no tenderness  HEART: regular rate and rhythm, S1, S2 normal, no murmur, click, rub or gallop   ABDOMEN: soft, non-tender; bowel sounds normal; no masses,  no organomegaly  EXTREMITIES: Extremities normal, no edema, no calf tenderness noted  NEURO: AAO X 3    I HAVE REVIEWED :    The vital signs, most recent cardiac testing, and most recent pertinent non-cardiology provider notes.    Current Outpatient Medications   Medication Instructions    allopurinoL (ZYLOPRIM) 300 mg, Oral, Daily    ALPRAZolam (XANAX) 0.25 MG tablet TAKE 1 TABLET (0.25 MG TOTAL) BY MOUTH 3 TIMES DAILY AS NEEDED FOR ANXIETY.    aspirin (ECOTRIN) 81 mg, Daily    atenoloL (TENORMIN) 50 mg, Oral, Daily    atorvastatin (LIPITOR) 20 mg, Oral, Daily    b complex vitamins capsule 1 capsule, Daily    co-enzyme Q-10 50 mg, Daily    colchicine-probenecid 0.5-500 mg (CO-BENEMID) 500-0.5 mg Tab TAKE 1 TABLET BY MOUTH DAILY    fosinopriL (MONOPRIL) 20 mg, Oral, Daily    inulin (FIBER GUMMIES ORAL) Take by mouth.    Lactobacillus rhamnosus GG (CULTURELLE) 10 billion cell capsule 1 capsule, Daily    levothyroxine (SYNTHROID) 75 mcg, Oral, Before breakfast    multivitamin (THERAGRAN) per tablet 1 tablet, Daily    pantoprazole (PROTONIX) 40 mg, Oral, Daily    potassium chloride (KLOR-CON) 10 MEQ TbSR 20 mEq, Oral, Daily    torsemide (DEMADEX) 20 mg, Oral, Every morning    traZODone (DESYREL) 50 mg, Oral, Nightly PRN        Assessment   1. Coronary artery disease involving native coronary artery of native heart without angina pectoris (Primary)      2. Essential hypertension      3. Mixed hyperlipidemia      4. Carotid artery disease without cerebral infarction      5. Venous insufficiency of both lower extremities      6. Morbidly obese      7. Prediabetes      8. Obstructive sleep  apnea syndrome         Plan to address above diagnoses:    No change to current plan.     Continue aspirin 81 mg daily  Continue atenolol 50 mg daily  Continue atorvastatin 40 mg daily   Continue coenzyme Q10   Continue fosinopril 20 mg daily  Continue torsemide 20 mg daily with extra doses as needed      I emphasized the importance of modifying lifestyle related risk factors including tobacco avoidance, limiting alcohol intake, aerobic exercise, weight management and Mediterranean diet.      Follow up in about 1 year (around 8/5/2026).     Polo Zuñiga NP  [facility]   Office: [phone]       [1]   Social History  Tobacco Use    Smoking status: Never     Passive exposure: Current (one day weekly)    Smokeless tobacco: Never   Substance Use Topics    Alcohol use: Yes     Alcohol/week: 35.0 standard drinks of alcohol     Types: 35 Drinks containing 0.5 oz of alcohol per week     Comment: increased - 5 large drinks nightly    Drug use: Never

## (undated) DEVICE — YANKAUER OPEN TIP W/O VENT

## (undated) DEVICE — DRAPE THREE-QTR REINF 53X77IN

## (undated) DEVICE — MARKER SKIN STND TIP BLUE BARR

## (undated) DEVICE — GLOVE SURGICAL LATEX SZ 7

## (undated) DEVICE — DRESSING ADHESIVE ISLAND 3 X 6

## (undated) DEVICE — DRAPE INCISE IOBAN 2 23X17IN

## (undated) DEVICE — CHLORAPREP W TINT 26ML APPL

## (undated) DEVICE — NDL HYPO REG 25G X 1 1/2

## (undated) DEVICE — SUT 2/0 36IN COATED VICRYL

## (undated) DEVICE — SUT 0 VICRYL / CT-1

## (undated) DEVICE — WRENCH HEXAGONAL 3

## (undated) DEVICE — TOOL TUNNELING 28CM

## (undated) DEVICE — APPLICATOR CHLORAPREP CLR 10.5

## (undated) DEVICE — KIT CHARGING SYSTEM

## (undated) DEVICE — GAUZE SPONGE 4X4 12PLY

## (undated) DEVICE — REMOVER LOTION

## (undated) DEVICE — TRAY NERVE BLOCK

## (undated) DEVICE — DRAPE LAP T SHT W/ INSTR PAD

## (undated) DEVICE — Device

## (undated) DEVICE — TOWEL OR DISP STRL BLUE 4/PK

## (undated) DEVICE — SYR 10CC LUER LOCK

## (undated) DEVICE — SUT MONOCYRL 4-0 PS2 UND

## (undated) DEVICE — CLEANER TIP ELECSURG 2X2IN

## (undated) DEVICE — SEE MEDLINE ITEM 152622

## (undated) DEVICE — CLOSURE SKIN STERI STRIP 1/2X4

## (undated) DEVICE — DRESSING MEPORE ISLAND 31/2X4

## (undated) DEVICE — DURAPREP SURG SCRUB 26ML

## (undated) DEVICE — NDL 18GA X1 1/2 REG BEVEL

## (undated) DEVICE — SEE L#120831

## (undated) DEVICE — SPONGE DERMACEA 4X4IN 12PLY

## (undated) DEVICE — PAD ELECTROSURGICAL PAT PLATE

## (undated) DEVICE — NDL SPINAL SPINOCAN 22GX3.5

## (undated) DEVICE — DRAPE C ARM 42 X 120 10/BX

## (undated) DEVICE — FREELINK REMOTE CONTROL KIT

## (undated) DEVICE — SCALPEL #11 BLADE STRL DISP

## (undated) DEVICE — SYR GLASS 5CC LUER LOK

## (undated) DEVICE — PENCIL ROCKER SWITCH 10FT CORD

## (undated) DEVICE — UNDERGLOVES BIOGEL PI SIZE 7.5

## (undated) DEVICE — CHLORAPREP 10.5 ML APPLICATOR

## (undated) DEVICE — SUT SILK 0 SH 30IN BLK BR

## (undated) DEVICE — FIXATE SUTURING DEVICE

## (undated) DEVICE — CANNULA CVD 100MM X 20G

## (undated) DEVICE — DRAPE STERI-DRAPE 1000 17X11IN

## (undated) DEVICE — BNDG COFLEX FOAM LF2 ST 4X5YD

## (undated) DEVICE — ELECTRODE REM PLYHSV RETURN 9